# Patient Record
Sex: MALE | Race: WHITE | NOT HISPANIC OR LATINO | Employment: OTHER | URBAN - NONMETROPOLITAN AREA
[De-identification: names, ages, dates, MRNs, and addresses within clinical notes are randomized per-mention and may not be internally consistent; named-entity substitution may affect disease eponyms.]

---

## 2017-06-29 ENCOUNTER — OFFICE VISIT (OUTPATIENT)
Dept: GASTROENTEROLOGY | Facility: CLINIC | Age: 67
End: 2017-06-29

## 2017-06-29 VITALS
BODY MASS INDEX: 40.43 KG/M2 | DIASTOLIC BLOOD PRESSURE: 82 MMHG | SYSTOLIC BLOOD PRESSURE: 128 MMHG | HEIGHT: 74 IN | OXYGEN SATURATION: 96 % | HEART RATE: 63 BPM | WEIGHT: 315 LBS

## 2017-06-29 DIAGNOSIS — K22.70 BARRETT'S ESOPHAGUS WITHOUT DYSPLASIA: ICD-10-CM

## 2017-06-29 DIAGNOSIS — K63.5 COLON POLYPS: Primary | ICD-10-CM

## 2017-06-29 PROCEDURE — 99213 OFFICE O/P EST LOW 20 MIN: CPT | Performed by: NURSE PRACTITIONER

## 2017-06-29 RX ORDER — SITAGLIPTIN 100 MG/1
TABLET, FILM COATED ORAL
Refills: 3 | COMMUNITY
Start: 2017-06-09 | End: 2020-07-29 | Stop reason: ALTCHOICE

## 2017-06-29 RX ORDER — TIMOLOL MALEATE 5 MG/ML
SOLUTION OPHTHALMIC
Refills: 5 | Status: ON HOLD | COMMUNITY
Start: 2017-05-27 | End: 2022-08-03

## 2017-06-29 RX ORDER — ALBUTEROL SULFATE 90 UG/1
AEROSOL, METERED RESPIRATORY (INHALATION)
COMMUNITY

## 2017-06-29 RX ORDER — LEVOCETIRIZINE DIHYDROCHLORIDE 5 MG/1
TABLET, FILM COATED ORAL
Status: ON HOLD | COMMUNITY
Start: 2017-05-11 | End: 2017-07-26 | Stop reason: SDUPTHER

## 2017-06-29 RX ORDER — FLUOXETINE HYDROCHLORIDE 20 MG/1
CAPSULE ORAL
Refills: 2 | COMMUNITY
Start: 2017-05-25

## 2017-06-29 RX ORDER — LANSOPRAZOLE 30 MG/1
CAPSULE, DELAYED RELEASE ORAL
COMMUNITY
Start: 2017-06-01

## 2017-06-29 RX ORDER — FERROUS SULFATE 325(65) MG
TABLET ORAL
COMMUNITY
Start: 2016-10-04

## 2017-06-29 RX ORDER — LEVOCETIRIZINE DIHYDROCHLORIDE 5 MG/1
TABLET, FILM COATED ORAL
COMMUNITY
Start: 2016-11-14 | End: 2023-02-13 | Stop reason: ALTCHOICE

## 2017-06-29 RX ORDER — HYDROCHLOROTHIAZIDE 12.5 MG/1
TABLET ORAL
COMMUNITY
Start: 2017-06-28

## 2017-06-29 RX ORDER — EPINEPHRINE 0.3 MG/.3ML
INJECTION SUBCUTANEOUS
COMMUNITY

## 2017-06-29 RX ORDER — BUDESONIDE AND FORMOTEROL FUMARATE DIHYDRATE 160; 4.5 UG/1; UG/1
AEROSOL RESPIRATORY (INHALATION)
Refills: 0 | COMMUNITY
Start: 2017-04-26 | End: 2020-07-29 | Stop reason: ALTCHOICE

## 2017-06-29 RX ORDER — ERGOCALCIFEROL 1.25 MG/1
CAPSULE ORAL
COMMUNITY
Start: 2017-06-27

## 2017-06-29 RX ORDER — METFORMIN HYDROCHLORIDE 750 MG/1
TABLET, EXTENDED RELEASE ORAL
COMMUNITY
Start: 2017-06-27 | End: 2019-06-04

## 2017-06-29 RX ORDER — FLUTICASONE PROPIONATE 50 MCG
SPRAY, SUSPENSION (ML) NASAL
COMMUNITY
End: 2021-08-09 | Stop reason: ALTCHOICE

## 2017-06-29 RX ORDER — RANITIDINE 150 MG/1
TABLET ORAL
Refills: 2 | COMMUNITY
Start: 2017-05-11 | End: 2021-02-01 | Stop reason: ALTCHOICE

## 2017-06-29 RX ORDER — KETOROLAC TROMETHAMINE 10 MG/1
TABLET, FILM COATED ORAL
COMMUNITY
End: 2020-07-29 | Stop reason: ALTCHOICE

## 2017-06-29 RX ORDER — PROPRANOLOL HYDROCHLORIDE 120 MG/1
CAPSULE, EXTENDED RELEASE ORAL
COMMUNITY
Start: 2017-06-27

## 2017-06-29 RX ORDER — AZELASTINE HCL 205.5 UG/1
SPRAY NASAL
COMMUNITY
End: 2021-08-09 | Stop reason: ALTCHOICE

## 2017-06-29 NOTE — PROGRESS NOTES
Chief Complaint   Patient presents with   • Colonoscopy     Patient states his diarrhea has gotten worse since he had gastric bypass.   • Endoscopy     Dx with Parrish's.       Subjective     HPI  Diarrhea daily 3-4 times in the morning, always loose, never formed, worse over past 3-4 years.  Hx of gastric bypass surgery.   No bloody or melena stools.   no abdominal pain.  Diarrhea  Unrelated to caffeine or food intake.  hx of sessile hyperplastic polyp at 95 cm during 2014 procedure.    Long hx of GERD related symptoms.  Diagnosed with Parrish's Esophagus many years ago, determined by biopsy.  GERD controlled with use of Prevacid in the morning and Zantac at night.  No dysphagia, no abdominal pain, no heartburn, no indigestion.  Last EGD in 2014 reviewed with pt.  Endoscopically he had stable appearance of Parrish's.      Past Medical History:   Diagnosis Date   • Bundle branch block, right    • COPD (chronic obstructive pulmonary disease)    • Diabetes mellitus    • Irregular heart beat        Past Surgical History:   Procedure Laterality Date   • CHOLECYSTECTOMY     • COLONOSCOPY  05/07/2014    One 95cm polyp removed.  repeat 3 years  Dr Brar   • ENDOSCOPY  05/07/2014    Parrish's  repeat 3 years Dr Brar   • GASTRIC BYPASS     • KNEE SURGERY Right    • NERVE SURGERY      left arm   • TONSILLECTOMY         Outpatient Prescriptions Marked as Taking for the 6/29/17 encounter (Office Visit) with BERNARDINO Huang   Medication Sig Dispense Refill   • aclidinium bromide (TUDORZA PRESSAIR) 400 MCG/ACT aerosol powder  powder for inhalation Inhale.     • albuterol (PROVENTIL HFA;VENTOLIN HFA) 108 (90 BASE) MCG/ACT inhaler Inhale.     • aspirin 81 MG tablet Take  by mouth.     • azelastine (ASTEPRO) 0.15 % solution nasal spray into each nostril.     • EPINEPHrine (EPIPEN) 0.3 MG/0.3ML solution auto-injector injection Inject  into the shoulder, thigh, or buttocks.     • ferrous sulfate 325 (65 FE) MG tablet Take   by mouth.     • FLUoxetine (PROzac) 20 MG capsule TAKE 1 CAPSULE BY MOUTH DAILY IN THE MORNING  2   • fluticasone (FLONASE) 50 MCG/ACT nasal spray into each nostril.     • hydrochlorothiazide (HYDRODIURIL) 12.5 MG tablet      • JANUVIA 100 MG tablet TAKE 1 TABLET BY MOUTH EVERY DAY  3   • ketorolac (TORADOL) 10 MG tablet Take  by mouth.     • lansoprazole (PREVACID) 30 MG capsule      • levocetirizine (XYZAL) 5 MG tablet      • levocetirizine (XYZAL) 5 MG tablet Take  by mouth.     • metFORMIN ER (GLUCOPHAGE-XR) 750 MG 24 hr tablet      • propranolol LA (INDERAL LA) 120 MG 24 hr capsule      • raNITIdine (ZANTAC) 150 MG tablet TAKE 1 TABLET BY MOUTH TWO TIMES A DAY  2   • SYMBICORT 160-4.5 MCG/ACT inhaler INHALE 2 PUFFS INTO LUNG TWO TIMES A DAY  0   • timolol (TIMOPTIC-XR) 0.5 % ophthalmic gel-forming 1 SQUIRT IN BOTH EYES DAILY  5   • vitamin D (ERGOCALCIFEROL) 39380 UNITS capsule capsule          Allergies   Allergen Reactions   • Iodides    • Penicillins    • Shellfish-Derived Products      Sea foods   • Sulfa Antibiotics        Social History     Social History   • Marital status:      Spouse name: N/A   • Number of children: N/A   • Years of education: N/A     Occupational History   • Not on file.     Social History Main Topics   • Smoking status: Never Smoker   • Smokeless tobacco: Never Used   • Alcohol use 3.6 oz/week     6 Shots of liquor per week   • Drug use: No   • Sexual activity: Defer     Other Topics Concern   • Not on file     Social History Narrative   • No narrative on file       Family History   Problem Relation Age of Onset   • Colon cancer Neg Hx    • Colon polyps Neg Hx    • Esophageal cancer Neg Hx    • Liver cancer Neg Hx    • Liver disease Neg Hx    • Rectal cancer Neg Hx    • Stomach cancer Neg Hx        Review of Systems   Constitutional: Negative for fatigue, fever and unexpected weight change.   HENT: Negative for hearing loss, sore throat and voice change.    Eyes: Negative  "for visual disturbance.   Respiratory: Negative for cough, shortness of breath and wheezing.    Cardiovascular: Negative for chest pain and palpitations.   Gastrointestinal: Negative for abdominal pain, blood in stool and vomiting.   Endocrine: Negative for polydipsia and polyuria.   Genitourinary: Negative for difficulty urinating, dysuria, hematuria and urgency.   Musculoskeletal: Negative for joint swelling and myalgias.   Skin: Negative for color change, rash and wound.   Neurological: Negative for dizziness, tremors, seizures and syncope.   Hematological: Does not bruise/bleed easily.   Psychiatric/Behavioral: Negative for agitation and confusion. The patient is not nervous/anxious.        Objective     Vitals:    06/29/17 1053   BP: 128/82   Pulse: 63   SpO2: 96%   Weight: (!) 319 lb (145 kg)   Height: 74\" (188 cm)     Body mass index is 40.96 kg/(m^2).    Physical Exam   Constitutional: He is oriented to person, place, and time. He appears well-developed and well-nourished.   HENT:   Head: Normocephalic and atraumatic.   Eyes:   Pink, Nonicteric   Neck:   Global Assessment- supple. No JVD or lymphadenopathy   Cardiovascular: Normal rate, regular rhythm and normal heart sounds.  Exam reveals no gallop and no friction rub.    No murmur heard.  Pulmonary/Chest: Effort normal and breath sounds normal. No respiratory distress. He has no wheezes. He has no rales.   Inspection: Movements-Symmetrical   Abdominal: Soft. Bowel sounds are normal. He exhibits no distension and no mass. There is no tenderness. There is no rebound and no guarding.   Neurological: He is alert and oriented to person, place, and time.   General Exam-Deemed a reliable historian, able to converse without difficulty and Able to move all extremities without difficulty       Imaging Results (most recent)     None          Assessment/Plan     Wayne was seen today for colonoscopy and endoscopy.    Diagnoses and all orders for this visit:    Colon " polyps  -     Case Request; Standing  -     Implement Anesthesia Orders Day of Procedure; Standing  -     Obtain Informed Consent; Standing  -     Case Request    Parrish's esophagus without dysplasia  -     Case Request; Standing  -     Implement Anesthesia Orders Day of Procedure; Standing  -     Obtain Informed Consent; Standing  -     Case Request      COLONOSCOPY WITH ANESTHESIA (N/A)  Endoscopy with anesthesia    Patient is to continue all blood pressure and cardiac medications prior to procedure.     Advised pt to stop ASA day prior to procedure and to stop use of NSAIDs, Fish Oil, and MV 5 days prior to procedure.  Tylenol based products are ok to take.  Pt verbalized understanding.    Pt to hold diabetes medication/insulin day of procedure to prevent any risk of complications of hypoglycemia intraprocedure.  If taking insulin 1/2 the PM dose as well    All risks, benefits, alternatives, and indications of colonoscopy procedure have been discussed with the patient. Risks to include perforation of the colon requiring possible surgery or colostomy, risk of bleeding from biopsies or removal of colon tissue, possibility of missing a colon polyp or cancer, or adverse drug reaction.  Benefits to include the diagnosis and management of disease of the colon and rectum. Alternatives to include barium enema, radiographic evaluation, lab testing or no intervention. Pt verbalizes understanding and agrees to proceed with procedure.    The risk of the endoscopy were discussed in detail.  We discussed the risk of perforation (one out of 1779-9947, riskier with dilation), bleeding (one out of 500), and the rare risks of infection, adverse reaction to anesthesia, respiratory failure, cardiac failure including MI and adverse reaction to medications, etc.  We discussed consequences that could occur if a risk were to develop such as the need for hospitalization, blood transfusion, surgical intervention, medications, pain and  disability and death.  Alternatives include not doing anything, or pursuing an UGI series which only offers a diagnosis with potential less accuracy compared to egd.  The patient verbalizes understanding and agrees to proceed.        There are no Patient Instructions on file for this visit.

## 2017-07-20 ENCOUNTER — OFFICE VISIT (OUTPATIENT)
Dept: CARDIOLOGY | Age: 67
End: 2017-07-20
Payer: MEDICARE

## 2017-07-20 VITALS
SYSTOLIC BLOOD PRESSURE: 132 MMHG | HEART RATE: 68 BPM | HEIGHT: 74 IN | DIASTOLIC BLOOD PRESSURE: 80 MMHG | WEIGHT: 315 LBS | BODY MASS INDEX: 40.43 KG/M2

## 2017-07-20 DIAGNOSIS — I25.10 CORONARY ATHEROSCLEROSIS DUE TO CALCIFIED CORONARY LESION OF NATIVE ARTERY: Primary | ICD-10-CM

## 2017-07-20 DIAGNOSIS — I25.84 CORONARY ATHEROSCLEROSIS DUE TO CALCIFIED CORONARY LESION OF NATIVE ARTERY: Primary | ICD-10-CM

## 2017-07-20 DIAGNOSIS — I10 ESSENTIAL HYPERTENSION: ICD-10-CM

## 2017-07-20 PROCEDURE — G8427 DOCREV CUR MEDS BY ELIG CLIN: HCPCS | Performed by: CLINICAL NURSE SPECIALIST

## 2017-07-20 PROCEDURE — 1036F TOBACCO NON-USER: CPT | Performed by: CLINICAL NURSE SPECIALIST

## 2017-07-20 PROCEDURE — G8598 ASA/ANTIPLAT THER USED: HCPCS | Performed by: CLINICAL NURSE SPECIALIST

## 2017-07-20 PROCEDURE — 3017F COLORECTAL CA SCREEN DOC REV: CPT | Performed by: CLINICAL NURSE SPECIALIST

## 2017-07-20 PROCEDURE — 4040F PNEUMOC VAC/ADMIN/RCVD: CPT | Performed by: CLINICAL NURSE SPECIALIST

## 2017-07-20 PROCEDURE — G8417 CALC BMI ABV UP PARAM F/U: HCPCS | Performed by: CLINICAL NURSE SPECIALIST

## 2017-07-20 PROCEDURE — 99213 OFFICE O/P EST LOW 20 MIN: CPT | Performed by: CLINICAL NURSE SPECIALIST

## 2017-07-20 PROCEDURE — 1123F ACP DISCUSS/DSCN MKR DOCD: CPT | Performed by: CLINICAL NURSE SPECIALIST

## 2017-07-26 ENCOUNTER — HOSPITAL ENCOUNTER (OUTPATIENT)
Facility: HOSPITAL | Age: 67
Setting detail: HOSPITAL OUTPATIENT SURGERY
Discharge: HOME | End: 2017-07-26
Attending: INTERNAL MEDICINE | Admitting: INTERNAL MEDICINE

## 2017-07-26 ENCOUNTER — ANESTHESIA (OUTPATIENT)
Dept: GASTROENTEROLOGY | Facility: HOSPITAL | Age: 67
End: 2017-07-26

## 2017-07-26 ENCOUNTER — ANESTHESIA EVENT (OUTPATIENT)
Dept: GASTROENTEROLOGY | Facility: HOSPITAL | Age: 67
End: 2017-07-26

## 2017-07-26 ENCOUNTER — TELEPHONE (OUTPATIENT)
Dept: GASTROENTEROLOGY | Facility: CLINIC | Age: 67
End: 2017-07-26

## 2017-07-26 VITALS
RESPIRATION RATE: 18 BRPM | TEMPERATURE: 97.2 F | DIASTOLIC BLOOD PRESSURE: 83 MMHG | BODY MASS INDEX: 40.43 KG/M2 | OXYGEN SATURATION: 97 % | HEART RATE: 53 BPM | SYSTOLIC BLOOD PRESSURE: 124 MMHG | HEIGHT: 74 IN | WEIGHT: 315 LBS

## 2017-07-26 DIAGNOSIS — K22.70 BARRETT'S ESOPHAGUS WITHOUT DYSPLASIA: ICD-10-CM

## 2017-07-26 LAB — GLUCOSE BLDC GLUCOMTR-MCNC: 207 MG/DL (ref 70–130)

## 2017-07-26 PROCEDURE — 88305 TISSUE EXAM BY PATHOLOGIST: CPT | Performed by: INTERNAL MEDICINE

## 2017-07-26 PROCEDURE — 82962 GLUCOSE BLOOD TEST: CPT

## 2017-07-26 PROCEDURE — 25010000002 PROPOFOL 10 MG/ML EMULSION: Performed by: NURSE ANESTHETIST, CERTIFIED REGISTERED

## 2017-07-26 PROCEDURE — 43239 EGD BIOPSY SINGLE/MULTIPLE: CPT | Performed by: INTERNAL MEDICINE

## 2017-07-26 RX ORDER — SODIUM CHLORIDE 9 MG/ML
500 INJECTION, SOLUTION INTRAVENOUS CONTINUOUS PRN
Status: DISCONTINUED | OUTPATIENT
Start: 2017-07-26 | End: 2017-07-26 | Stop reason: HOSPADM

## 2017-07-26 RX ORDER — SODIUM CHLORIDE 0.9 % (FLUSH) 0.9 %
3 SYRINGE (ML) INJECTION AS NEEDED
Status: DISCONTINUED | OUTPATIENT
Start: 2017-07-26 | End: 2017-07-26 | Stop reason: HOSPADM

## 2017-07-26 RX ORDER — PROPOFOL 10 MG/ML
VIAL (ML) INTRAVENOUS AS NEEDED
Status: DISCONTINUED | OUTPATIENT
Start: 2017-07-26 | End: 2017-07-26 | Stop reason: SURG

## 2017-07-26 RX ORDER — LIDOCAINE HYDROCHLORIDE 10 MG/ML
0.5 INJECTION, SOLUTION INFILTRATION; PERINEURAL ONCE AS NEEDED
Status: COMPLETED | OUTPATIENT
Start: 2017-07-26 | End: 2017-07-26

## 2017-07-26 RX ADMIN — LIDOCAINE HYDROCHLORIDE 0.5 ML: 10 INJECTION, SOLUTION INFILTRATION; PERINEURAL at 09:28

## 2017-07-26 RX ADMIN — PROPOFOL 80 MG: 10 INJECTION, EMULSION INTRAVENOUS at 10:23

## 2017-07-26 RX ADMIN — SODIUM CHLORIDE: 9 INJECTION, SOLUTION INTRAVENOUS at 10:23

## 2017-07-26 RX ADMIN — PROPOFOL 30 MG: 10 INJECTION, EMULSION INTRAVENOUS at 10:24

## 2017-07-26 RX ADMIN — PROPOFOL 30 MG: 10 INJECTION, EMULSION INTRAVENOUS at 10:25

## 2017-07-26 RX ADMIN — SODIUM CHLORIDE 500 ML: 9 INJECTION, SOLUTION INTRAVENOUS at 09:27

## 2017-07-26 NOTE — H&P (VIEW-ONLY)
Chief Complaint   Patient presents with   • Colonoscopy     Patient states his diarrhea has gotten worse since he had gastric bypass.   • Endoscopy     Dx with Parrish's.       Subjective     HPI  Diarrhea daily 3-4 times in the morning, always loose, never formed, worse over past 3-4 years.  Hx of gastric bypass surgery.   No bloody or melena stools.   no abdominal pain.  Diarrhea  Unrelated to caffeine or food intake.  hx of sessile hyperplastic polyp at 95 cm during 2014 procedure.    Long hx of GERD related symptoms.  Diagnosed with Parrish's Esophagus many years ago, determined by biopsy.  GERD controlled with use of Prevacid in the morning and Zantac at night.  No dysphagia, no abdominal pain, no heartburn, no indigestion.  Last EGD in 2014 reviewed with pt.  Endoscopically he had stable appearance of Parrish's.      Past Medical History:   Diagnosis Date   • Bundle branch block, right    • COPD (chronic obstructive pulmonary disease)    • Diabetes mellitus    • Irregular heart beat        Past Surgical History:   Procedure Laterality Date   • CHOLECYSTECTOMY     • COLONOSCOPY  05/07/2014    One 95cm polyp removed.  repeat 3 years  Dr Brar   • ENDOSCOPY  05/07/2014    Parrish's  repeat 3 years Dr Brar   • GASTRIC BYPASS     • KNEE SURGERY Right    • NERVE SURGERY      left arm   • TONSILLECTOMY         Outpatient Prescriptions Marked as Taking for the 6/29/17 encounter (Office Visit) with BERNARDINO Huang   Medication Sig Dispense Refill   • aclidinium bromide (TUDORZA PRESSAIR) 400 MCG/ACT aerosol powder  powder for inhalation Inhale.     • albuterol (PROVENTIL HFA;VENTOLIN HFA) 108 (90 BASE) MCG/ACT inhaler Inhale.     • aspirin 81 MG tablet Take  by mouth.     • azelastine (ASTEPRO) 0.15 % solution nasal spray into each nostril.     • EPINEPHrine (EPIPEN) 0.3 MG/0.3ML solution auto-injector injection Inject  into the shoulder, thigh, or buttocks.     • ferrous sulfate 325 (65 FE) MG tablet Take   by mouth.     • FLUoxetine (PROzac) 20 MG capsule TAKE 1 CAPSULE BY MOUTH DAILY IN THE MORNING  2   • fluticasone (FLONASE) 50 MCG/ACT nasal spray into each nostril.     • hydrochlorothiazide (HYDRODIURIL) 12.5 MG tablet      • JANUVIA 100 MG tablet TAKE 1 TABLET BY MOUTH EVERY DAY  3   • ketorolac (TORADOL) 10 MG tablet Take  by mouth.     • lansoprazole (PREVACID) 30 MG capsule      • levocetirizine (XYZAL) 5 MG tablet      • levocetirizine (XYZAL) 5 MG tablet Take  by mouth.     • metFORMIN ER (GLUCOPHAGE-XR) 750 MG 24 hr tablet      • propranolol LA (INDERAL LA) 120 MG 24 hr capsule      • raNITIdine (ZANTAC) 150 MG tablet TAKE 1 TABLET BY MOUTH TWO TIMES A DAY  2   • SYMBICORT 160-4.5 MCG/ACT inhaler INHALE 2 PUFFS INTO LUNG TWO TIMES A DAY  0   • timolol (TIMOPTIC-XR) 0.5 % ophthalmic gel-forming 1 SQUIRT IN BOTH EYES DAILY  5   • vitamin D (ERGOCALCIFEROL) 59682 UNITS capsule capsule          Allergies   Allergen Reactions   • Iodides    • Penicillins    • Shellfish-Derived Products      Sea foods   • Sulfa Antibiotics        Social History     Social History   • Marital status:      Spouse name: N/A   • Number of children: N/A   • Years of education: N/A     Occupational History   • Not on file.     Social History Main Topics   • Smoking status: Never Smoker   • Smokeless tobacco: Never Used   • Alcohol use 3.6 oz/week     6 Shots of liquor per week   • Drug use: No   • Sexual activity: Defer     Other Topics Concern   • Not on file     Social History Narrative   • No narrative on file       Family History   Problem Relation Age of Onset   • Colon cancer Neg Hx    • Colon polyps Neg Hx    • Esophageal cancer Neg Hx    • Liver cancer Neg Hx    • Liver disease Neg Hx    • Rectal cancer Neg Hx    • Stomach cancer Neg Hx        Review of Systems   Constitutional: Negative for fatigue, fever and unexpected weight change.   HENT: Negative for hearing loss, sore throat and voice change.    Eyes: Negative  "for visual disturbance.   Respiratory: Negative for cough, shortness of breath and wheezing.    Cardiovascular: Negative for chest pain and palpitations.   Gastrointestinal: Negative for abdominal pain, blood in stool and vomiting.   Endocrine: Negative for polydipsia and polyuria.   Genitourinary: Negative for difficulty urinating, dysuria, hematuria and urgency.   Musculoskeletal: Negative for joint swelling and myalgias.   Skin: Negative for color change, rash and wound.   Neurological: Negative for dizziness, tremors, seizures and syncope.   Hematological: Does not bruise/bleed easily.   Psychiatric/Behavioral: Negative for agitation and confusion. The patient is not nervous/anxious.        Objective     Vitals:    06/29/17 1053   BP: 128/82   Pulse: 63   SpO2: 96%   Weight: (!) 319 lb (145 kg)   Height: 74\" (188 cm)     Body mass index is 40.96 kg/(m^2).    Physical Exam   Constitutional: He is oriented to person, place, and time. He appears well-developed and well-nourished.   HENT:   Head: Normocephalic and atraumatic.   Eyes:   Pink, Nonicteric   Neck:   Global Assessment- supple. No JVD or lymphadenopathy   Cardiovascular: Normal rate, regular rhythm and normal heart sounds.  Exam reveals no gallop and no friction rub.    No murmur heard.  Pulmonary/Chest: Effort normal and breath sounds normal. No respiratory distress. He has no wheezes. He has no rales.   Inspection: Movements-Symmetrical   Abdominal: Soft. Bowel sounds are normal. He exhibits no distension and no mass. There is no tenderness. There is no rebound and no guarding.   Neurological: He is alert and oriented to person, place, and time.   General Exam-Deemed a reliable historian, able to converse without difficulty and Able to move all extremities without difficulty       Imaging Results (most recent)     None          Assessment/Plan     Wayne was seen today for colonoscopy and endoscopy.    Diagnoses and all orders for this visit:    Colon " polyps  -     Case Request; Standing  -     Implement Anesthesia Orders Day of Procedure; Standing  -     Obtain Informed Consent; Standing  -     Case Request    Parrish's esophagus without dysplasia  -     Case Request; Standing  -     Implement Anesthesia Orders Day of Procedure; Standing  -     Obtain Informed Consent; Standing  -     Case Request      COLONOSCOPY WITH ANESTHESIA (N/A)  Endoscopy with anesthesia    Patient is to continue all blood pressure and cardiac medications prior to procedure.     Advised pt to stop ASA day prior to procedure and to stop use of NSAIDs, Fish Oil, and MV 5 days prior to procedure.  Tylenol based products are ok to take.  Pt verbalized understanding.    Pt to hold diabetes medication/insulin day of procedure to prevent any risk of complications of hypoglycemia intraprocedure.  If taking insulin 1/2 the PM dose as well    All risks, benefits, alternatives, and indications of colonoscopy procedure have been discussed with the patient. Risks to include perforation of the colon requiring possible surgery or colostomy, risk of bleeding from biopsies or removal of colon tissue, possibility of missing a colon polyp or cancer, or adverse drug reaction.  Benefits to include the diagnosis and management of disease of the colon and rectum. Alternatives to include barium enema, radiographic evaluation, lab testing or no intervention. Pt verbalizes understanding and agrees to proceed with procedure.    The risk of the endoscopy were discussed in detail.  We discussed the risk of perforation (one out of 0861-6041, riskier with dilation), bleeding (one out of 500), and the rare risks of infection, adverse reaction to anesthesia, respiratory failure, cardiac failure including MI and adverse reaction to medications, etc.  We discussed consequences that could occur if a risk were to develop such as the need for hospitalization, blood transfusion, surgical intervention, medications, pain and  disability and death.  Alternatives include not doing anything, or pursuing an UGI series which only offers a diagnosis with potential less accuracy compared to egd.  The patient verbalizes understanding and agrees to proceed.        There are no Patient Instructions on file for this visit.

## 2017-07-26 NOTE — PLAN OF CARE
Problem: Patient Care Overview (Adult)  Goal: Plan of Care Review  Outcome: Outcome(s) achieved Date Met:  07/26/17

## 2017-07-26 NOTE — PLAN OF CARE
Problem: Patient Care Overview (Adult)  Goal: Plan of Care Review  Outcome: Ongoing (interventions implemented as appropriate)    07/26/17 1022   Patient Care Overview   Progress improving   Outcome Evaluation   Outcome Summary/Follow up Plan no noted problems

## 2017-07-26 NOTE — PLAN OF CARE
Problem: GI Endoscopy (Adult)  Goal: Signs and Symptoms of Listed Potential Problems Will be Absent or Manageable (GI Endoscopy)  Outcome: Outcome(s) achieved Date Met:  07/26/17

## 2017-07-26 NOTE — ANESTHESIA PREPROCEDURE EVALUATION
Anesthesia Evaluation     Patient summary reviewed and Nursing notes reviewed   no history of anesthetic complications:  NPO Solid Status: > 8 hours  NPO Liquid Status: > 8 hours     Airway   Mallampati: II  TM distance: >3 FB  Neck ROM: full  no difficulty expected  Dental      Pulmonary     breath sounds clear to auscultation  (+) COPD (uses inhalers regularly), sleep apnea on CPAP,   (-) not a smoker  Cardiovascular   Exercise tolerance: good (4-7 METS)    Patient on routine beta blocker and Beta blocker given within 24 hours of surgery  Rhythm: regular  Rate: normal    (+) hypertension, CAD (noted calcification on chest CT, negative stress test 6 months ago), dysrhythmias (RBBB),       Neuro/Psych- negative ROS  (-) seizures, TIA, CVA  GI/Hepatic/Renal/Endo    (+) morbid obesity, diabetes mellitus type 2,   (-) liver disease, no renal disease    Musculoskeletal     Abdominal    Substance History - negative use     OB/GYN negative ob/gyn ROS         Other   (+) arthritis                                     Anesthesia Plan    ASA 3     general     intravenous induction   Anesthetic plan and risks discussed with patient.

## 2017-07-26 NOTE — ANESTHESIA POSTPROCEDURE EVALUATION
Patient: Wayne Linares    Procedure Summary     Date Anesthesia Start Anesthesia Stop Room / Location    07/26/17 1020 1036  [facility] /  [facility]       Procedure Diagnosis Surgeon Provider    ESOPHAGOGASTRODUODENOSCOPY WITH ANESTHESIA (N/A Esophagus) Parrish's esophagus without dysplasia  (Parrish's esophagus without dysplasia [K22.70]) DO David Lebron CRNA          Anesthesia Type: general  Last vitals  BP   113/75 (07/26/17 1038)    Temp        Pulse   57 (07/26/17 1038)   Resp   18 (07/26/17 1038)    SpO2   96 % (07/26/17 1038)      Post Anesthesia Care and Evaluation    Patient location during evaluation: PHASE II  Patient participation: waiting for patient participation  Level of consciousness: awake  Pain score: 0  Pain management: adequate  Airway patency: patent  Anesthetic complications: No anesthetic complications  PONV Status: none  Cardiovascular status: acceptable  Respiratory status: acceptable  Hydration status: acceptable  No anesthesia care post op

## 2017-07-27 LAB
CYTO UR: NORMAL
LAB AP CASE REPORT: NORMAL
LAB AP CLINICAL INFORMATION: NORMAL
Lab: NORMAL
PATH REPORT.FINAL DX SPEC: NORMAL
PATH REPORT.GROSS SPEC: NORMAL

## 2017-08-01 ENCOUNTER — ANESTHESIA (OUTPATIENT)
Dept: GASTROENTEROLOGY | Facility: HOSPITAL | Age: 67
End: 2017-08-01

## 2017-08-01 ENCOUNTER — TELEPHONE (OUTPATIENT)
Dept: GASTROENTEROLOGY | Facility: CLINIC | Age: 67
End: 2017-08-01

## 2017-08-01 ENCOUNTER — HOSPITAL ENCOUNTER (OUTPATIENT)
Facility: HOSPITAL | Age: 67
Setting detail: HOSPITAL OUTPATIENT SURGERY
Discharge: HOME | End: 2017-08-01
Attending: INTERNAL MEDICINE | Admitting: INTERNAL MEDICINE

## 2017-08-01 ENCOUNTER — ANESTHESIA EVENT (OUTPATIENT)
Dept: GASTROENTEROLOGY | Facility: HOSPITAL | Age: 67
End: 2017-08-01

## 2017-08-01 VITALS
SYSTOLIC BLOOD PRESSURE: 114 MMHG | HEIGHT: 74 IN | WEIGHT: 315 LBS | HEART RATE: 58 BPM | DIASTOLIC BLOOD PRESSURE: 70 MMHG | TEMPERATURE: 97.4 F | RESPIRATION RATE: 17 BRPM | OXYGEN SATURATION: 96 % | BODY MASS INDEX: 40.43 KG/M2

## 2017-08-01 DIAGNOSIS — K63.5 COLON POLYPS: ICD-10-CM

## 2017-08-01 LAB — GLUCOSE BLDC GLUCOMTR-MCNC: 170 MG/DL (ref 70–130)

## 2017-08-01 PROCEDURE — 82962 GLUCOSE BLOOD TEST: CPT

## 2017-08-01 PROCEDURE — 45385 COLONOSCOPY W/LESION REMOVAL: CPT | Performed by: INTERNAL MEDICINE

## 2017-08-01 PROCEDURE — 25010000002 PROPOFOL 10 MG/ML EMULSION: Performed by: NURSE ANESTHETIST, CERTIFIED REGISTERED

## 2017-08-01 PROCEDURE — 88305 TISSUE EXAM BY PATHOLOGIST: CPT | Performed by: INTERNAL MEDICINE

## 2017-08-01 RX ORDER — SODIUM CHLORIDE 0.9 % (FLUSH) 0.9 %
3 SYRINGE (ML) INJECTION AS NEEDED
Status: DISCONTINUED | OUTPATIENT
Start: 2017-08-01 | End: 2017-08-01 | Stop reason: HOSPADM

## 2017-08-01 RX ORDER — SODIUM CHLORIDE 9 MG/ML
500 INJECTION, SOLUTION INTRAVENOUS CONTINUOUS PRN
Status: DISCONTINUED | OUTPATIENT
Start: 2017-08-01 | End: 2017-08-01 | Stop reason: HOSPADM

## 2017-08-01 RX ORDER — LIDOCAINE HYDROCHLORIDE 10 MG/ML
0.5 INJECTION, SOLUTION INFILTRATION; PERINEURAL ONCE AS NEEDED
Status: DISCONTINUED | OUTPATIENT
Start: 2017-08-01 | End: 2017-08-01

## 2017-08-01 RX ORDER — PROPOFOL 10 MG/ML
VIAL (ML) INTRAVENOUS AS NEEDED
Status: DISCONTINUED | OUTPATIENT
Start: 2017-08-01 | End: 2017-08-01 | Stop reason: SURG

## 2017-08-01 RX ADMIN — PROPOFOL 200 MG: 10 INJECTION, EMULSION INTRAVENOUS at 09:05

## 2017-08-01 RX ADMIN — SODIUM CHLORIDE 500 ML: 0.9 INJECTION, SOLUTION INTRAVENOUS at 08:39

## 2017-08-01 NOTE — PLAN OF CARE
Problem: Patient Care Overview (Adult)  Goal: Plan of Care Review  Outcome: Outcome(s) achieved Date Met:  08/01/17 08/01/17 0951   Patient Care Overview   Progress improving   Outcome Evaluation   Outcome Summary/Follow up Plan D/C CRITERIA MET   Coping/Psychosocial Response Interventions   Plan Of Care Reviewed With patient;spouse

## 2017-08-01 NOTE — ANESTHESIA PREPROCEDURE EVALUATION
Anesthesia Evaluation     Patient summary reviewed and Nursing notes reviewed   no history of anesthetic complications:  NPO Solid Status: > 8 hours  NPO Liquid Status: > 8 hours     Airway   Mallampati: II  TM distance: >3 FB  Neck ROM: full  Dental      Pulmonary     breath sounds clear to auscultation  (+) COPD (uses inhalers regularly), sleep apnea on CPAP,   (-) not a smoker  Cardiovascular   Exercise tolerance: good (4-7 METS)    Patient on routine beta blocker and Beta blocker given within 24 hours of surgery  Rhythm: regular  Rate: normal    (+) hypertension, CAD (noted calcification on chest CT, negative stress test 6 months ago), dysrhythmias (RBBB),       Neuro/Psych- negative ROS  (-) seizures, TIA, CVA  GI/Hepatic/Renal/Endo    (+) morbid obesity, diabetes mellitus type 2,   (-) liver disease, no renal disease    Musculoskeletal     Abdominal    Substance History - negative use     OB/GYN negative ob/gyn ROS         Other   (+) arthritis                                     Anesthesia Plan    ASA 3     MAC     intravenous induction   Anesthetic plan and risks discussed with patient.    Plan discussed with CRNA.

## 2017-08-01 NOTE — H&P
[facility]  Pre Procedure History & Physical    Chief Complaint:   H/O Polyps    Subjective     HPI:   Polyps    Past Medical History:   Past Medical History:   Diagnosis Date   • Arthritis    • Bundle branch block, right    • COPD (chronic obstructive pulmonary disease)    • Coronary artery disease    • Diabetes mellitus    • Hypertension    • Irregular heart beat    • Sleep apnea        Past Surgical History:  [unfilled]    Family History:  Family History   Problem Relation Age of Onset   • Cancer Mother      breast   • Heart disease Father    • Cancer Brother      bladder   • Colon cancer Neg Hx    • Colon polyps Neg Hx    • Esophageal cancer Neg Hx    • Liver cancer Neg Hx    • Liver disease Neg Hx    • Rectal cancer Neg Hx    • Stomach cancer Neg Hx        Social History:   reports that he has never smoked. He has never used smokeless tobacco. He reports that he drinks about 3.6 oz of alcohol per week  He reports that he does not use illicit drugs.    Medications:   Prior to Admission medications    Medication Sig Start Date End Date Taking? Authorizing Provider   azelastine (ASTEPRO) 0.15 % solution nasal spray into each nostril.   Yes Historical Provider, MD   FLUoxetine (PROzac) 20 MG capsule TAKE 1 CAPSULE BY MOUTH DAILY IN THE MORNING 5/25/17  Yes Historical Provider, MD   fluticasone (FLONASE) 50 MCG/ACT nasal spray into each nostril.   Yes Historical Provider, MD   Iron Combinations (IRON COMPLEX PO) Take  by mouth.   Yes Historical Provider, MD   lansoprazole (PREVACID) 30 MG capsule  6/1/17  Yes Historical Provider, MD   propranolol LA (INDERAL LA) 120 MG 24 hr capsule  6/27/17  Yes Historical Provider, MD   SYMBICORT 160-4.5 MCG/ACT inhaler INHALE 2 PUFFS INTO LUNG TWO TIMES A DAY 4/26/17  Yes Historical Provider, MD   timolol (TIMOPTIC-XR) 0.5 % ophthalmic gel-forming 1 SQUIRT IN BOTH EYES DAILY 5/27/17  Yes Historical Provider, MD   aclidinium bromide (TUDORZA PRESSAIR) 400 MCG/ACT  "aerosol powder  powder for inhalation Inhale.    Historical Provider, MD   albuterol (PROVENTIL HFA;VENTOLIN HFA) 108 (90 BASE) MCG/ACT inhaler Inhale.    Historical Provider, MD   aspirin 81 MG tablet Take  by mouth.    Historical Provider, MD   EPINEPHrine (EPIPEN) 0.3 MG/0.3ML solution auto-injector injection Inject  into the shoulder, thigh, or buttocks.    Historical Provider, MD   ferrous sulfate 325 (65 FE) MG tablet Take  by mouth. 10/4/16   Historical Provider, MD   hydrochlorothiazide (HYDRODIURIL) 12.5 MG tablet  6/28/17   Historical Provider, MD   JANUVIA 100 MG tablet TAKE 1 TABLET BY MOUTH EVERY DAY 6/9/17   Historical Provider, MD   ketorolac (TORADOL) 10 MG tablet Take  by mouth.    Historical Provider, MD   levocetirizine (XYZAL) 5 MG tablet Take  by mouth. 11/14/16   Historical Provider, MD   metFORMIN ER (GLUCOPHAGE-XR) 750 MG 24 hr tablet  6/27/17   Historical Provider, MD   raNITIdine (ZANTAC) 150 MG tablet TAKE 1 TABLET BY MOUTH TWO TIMES A DAY 5/11/17   Historical Provider, MD   vitamin D (ERGOCALCIFEROL) 74691 UNITS capsule capsule  6/27/17   Historical Provider, MD       Allergies:  Iodides; Shellfish-derived products; Penicillins; and Sulfa antibiotics    Objective     Blood pressure 122/81, pulse 56, temperature 97.4 °F (36.3 °C), temperature source Temporal Artery , resp. rate 20, height 74\" (188 cm), weight (!) 317 lb (144 kg), SpO2 97 %.    Physical Exam   Constitutional: Pt is oriented to person, place, and in no distress.   HENT: Mouth/Throat: Oropharynx is clear.   Cardiovascular: Normal rate, regular rhythm.    Pulmonary/Chest: Effort normal. No respiratory distress. No  wheezes.   Abdominal: Soft. Non-distended.  Skin: Skin is warm and dry.   Psychiatric: Mood, memory, affect and judgment appear normal.     Assessment/Plan     Diagnosis:  Polyps    Anticipated Surgical Procedure:  C-scope    The risks, benefits, and alternatives of this procedure have been discussed with the " patient or the responsible party- the patient understands and agrees to proceed.

## 2017-08-01 NOTE — PLAN OF CARE
Problem: GI Endoscopy (Adult)  Goal: Signs and Symptoms of Listed Potential Problems Will be Absent or Manageable (GI Endoscopy)  Outcome: Outcome(s) achieved Date Met:  08/01/17

## 2017-08-01 NOTE — ANESTHESIA POSTPROCEDURE EVALUATION
Patient: Wayne Linares    Procedure Summary     Date Anesthesia Start Anesthesia Stop Room / Location    08/01/17 0901 0921 [facility] ENDOSCOPY 6 / [facility]       Procedure Diagnosis Surgeon Provider    COLONOSCOPY WITH ANESTHESIA (N/A ) Colon polyps  (Colon polyps [K63.5]) DO Claudia Lebron, MATTHIAS          Anesthesia Type: MAC  Last vitals  BP   119/69 (08/01/17 0935)    Temp        Pulse   55 (08/01/17 0935)   Resp   16 (08/01/17 0935)    SpO2   96 % (08/01/17 0935)      Post Anesthesia Care and Evaluation    Patient location during evaluation: PHASE II  Patient participation: complete - patient participated  Level of consciousness: awake and alert  Pain management: adequate  Airway patency: patent  Anesthetic complications: No anesthetic complications  PONV Status: none  Cardiovascular status: acceptable  Respiratory status: acceptable  Hydration status: acceptable

## 2017-08-01 NOTE — PLAN OF CARE
Problem: Patient Care Overview (Adult)  Goal: Plan of Care Review  Outcome: Ongoing (interventions implemented as appropriate)    08/01/17 0914   Patient Care Overview   Progress improving   Outcome Evaluation   Outcome Summary/Follow up Plan pt tolerating procedure well          Problem: GI Endoscopy (Adult)  Goal: Signs and Symptoms of Listed Potential Problems Will be Absent or Manageable (GI Endoscopy)  Outcome: Ongoing (interventions implemented as appropriate)    08/01/17 0914   GI Endoscopy   Problems Assessed (GI Endoscopy) all   Problems Present (GI Endoscopy) none

## 2017-09-21 ENCOUNTER — OFFICE VISIT (OUTPATIENT)
Dept: NEUROLOGY | Age: 67
End: 2017-09-21
Payer: MEDICARE

## 2017-09-21 VITALS
WEIGHT: 315 LBS | HEART RATE: 55 BPM | DIASTOLIC BLOOD PRESSURE: 78 MMHG | OXYGEN SATURATION: 93 % | HEIGHT: 74 IN | BODY MASS INDEX: 40.43 KG/M2 | SYSTOLIC BLOOD PRESSURE: 117 MMHG

## 2017-09-21 DIAGNOSIS — G25.0 ESSENTIAL TREMOR: ICD-10-CM

## 2017-09-21 DIAGNOSIS — R40.0 SOMNOLENCE: ICD-10-CM

## 2017-09-21 DIAGNOSIS — G56.21 CUBITAL TUNNEL SYNDROME, RIGHT: ICD-10-CM

## 2017-09-21 DIAGNOSIS — G47.33 SLEEP APNEA, OBSTRUCTIVE: Primary | ICD-10-CM

## 2017-09-21 PROCEDURE — 4040F PNEUMOC VAC/ADMIN/RCVD: CPT | Performed by: PSYCHIATRY & NEUROLOGY

## 2017-09-21 PROCEDURE — G8417 CALC BMI ABV UP PARAM F/U: HCPCS | Performed by: PSYCHIATRY & NEUROLOGY

## 2017-09-21 PROCEDURE — 1036F TOBACCO NON-USER: CPT | Performed by: PSYCHIATRY & NEUROLOGY

## 2017-09-21 PROCEDURE — G8598 ASA/ANTIPLAT THER USED: HCPCS | Performed by: PSYCHIATRY & NEUROLOGY

## 2017-09-21 PROCEDURE — G8427 DOCREV CUR MEDS BY ELIG CLIN: HCPCS | Performed by: PSYCHIATRY & NEUROLOGY

## 2017-09-21 PROCEDURE — 3017F COLORECTAL CA SCREEN DOC REV: CPT | Performed by: PSYCHIATRY & NEUROLOGY

## 2017-09-21 PROCEDURE — 1123F ACP DISCUSS/DSCN MKR DOCD: CPT | Performed by: PSYCHIATRY & NEUROLOGY

## 2017-09-21 PROCEDURE — 99213 OFFICE O/P EST LOW 20 MIN: CPT | Performed by: PSYCHIATRY & NEUROLOGY

## 2018-01-11 ENCOUNTER — OFFICE VISIT (OUTPATIENT)
Dept: CARDIOLOGY | Age: 68
End: 2018-01-11
Payer: MEDICARE

## 2018-01-11 VITALS
WEIGHT: 299 LBS | HEART RATE: 60 BPM | DIASTOLIC BLOOD PRESSURE: 72 MMHG | HEIGHT: 74 IN | SYSTOLIC BLOOD PRESSURE: 128 MMHG | BODY MASS INDEX: 38.37 KG/M2

## 2018-01-11 DIAGNOSIS — I10 ESSENTIAL HYPERTENSION: Primary | ICD-10-CM

## 2018-01-11 DIAGNOSIS — I25.84 CORONARY ATHEROSCLEROSIS DUE TO CALCIFIED CORONARY LESION OF NATIVE ARTERY: ICD-10-CM

## 2018-01-11 DIAGNOSIS — I25.10 CORONARY ATHEROSCLEROSIS DUE TO CALCIFIED CORONARY LESION OF NATIVE ARTERY: ICD-10-CM

## 2018-01-11 PROCEDURE — 4040F PNEUMOC VAC/ADMIN/RCVD: CPT | Performed by: CLINICAL NURSE SPECIALIST

## 2018-01-11 PROCEDURE — 1036F TOBACCO NON-USER: CPT | Performed by: CLINICAL NURSE SPECIALIST

## 2018-01-11 PROCEDURE — 3017F COLORECTAL CA SCREEN DOC REV: CPT | Performed by: CLINICAL NURSE SPECIALIST

## 2018-01-11 PROCEDURE — G8484 FLU IMMUNIZE NO ADMIN: HCPCS | Performed by: CLINICAL NURSE SPECIALIST

## 2018-01-11 PROCEDURE — 1123F ACP DISCUSS/DSCN MKR DOCD: CPT | Performed by: CLINICAL NURSE SPECIALIST

## 2018-01-11 PROCEDURE — G8427 DOCREV CUR MEDS BY ELIG CLIN: HCPCS | Performed by: CLINICAL NURSE SPECIALIST

## 2018-01-11 PROCEDURE — G8417 CALC BMI ABV UP PARAM F/U: HCPCS | Performed by: CLINICAL NURSE SPECIALIST

## 2018-01-11 PROCEDURE — G8598 ASA/ANTIPLAT THER USED: HCPCS | Performed by: CLINICAL NURSE SPECIALIST

## 2018-01-11 PROCEDURE — 93000 ELECTROCARDIOGRAM COMPLETE: CPT | Performed by: CLINICAL NURSE SPECIALIST

## 2018-01-11 PROCEDURE — 99213 OFFICE O/P EST LOW 20 MIN: CPT | Performed by: CLINICAL NURSE SPECIALIST

## 2018-01-11 NOTE — PROGRESS NOTES
[facility] [place]  [address]  Phone: [phone]  Fax: [phone]    OFFICE VISIT:  2018    Hubert Dandy - : 1950    Reason For Visit:  Chandni Fountain is a 78 y.o. male who is here for 6 Month Follow-Up (no cardiac symptoms) and Coronary Artery Disease  Coronary Calcification noted on high-resolution CT scan  Negative Lexiscan 10/16  2-D echo showed   1. Limited visualization due to body habitus   2. Bi-atrial enlargement   3. Moderately enlarged left ventricular cavity dimensions   4. Mild concentric LVH   5. Normal LV systolic function (estimated EF 55 -60 %)   6. Enlarged RV with reduced function     Non-insulin-dependent diabetic on appropriate medication. Has not had heart catheterization    Subjective  Chandni Fountain denies exertional chest pain, shortness of breath, orthopnea, paroxysmal nocturnal dyspnea, syncope. He has ELROY and wears CPAP. No presyncope, arrhythmia, edema and fatigue. The patient denies numbness or weakness to suggest cerebrovascular accident or transient ischemic attack. Pepe Hearn MD is PCP and follows labs including lipids- just started on Fish Oil. He has recently lost about 20 lbs with diet and exercise.    Hubert Dandy [facility] the [facility] [place]    Patient Active Problem List    Diagnosis Date Noted    Sleep apnea, obstructive 2017    Coronary atherosclerosis due to calcified coronary lesion of native artery     Hypertension     Obesity     ELROY on CPAP      Past Medical History:   Diagnosis Date    Allergic rhinitis     Clark esophagus     BPH (benign prostatic hyperplasia)     Chronic fatigue syndrome     COPD (chronic obstructive pulmonary disease) (HCC)     Coronary atherosclerosis due to calcified coronary lesion of native artery     Gastritis     GERD (gastroesophageal reflux disease)     Glucose intolerance (impaired glucose tolerance)     History of knee sprain     Hyperlipidemia     Hypertension     Non-insulin dependent type 2 diabetes mellitus ([facility] [address]     Obesity     ELROY on CPAP     Tremor      Past Surgical History:   Procedure Laterality Date    BARIATRIC SURGERY  02/09/2005    COLONOSCOPY  05/22/2006    ELBOW SURGERY Right 08/21/2013    TOTAL KNEE ARTHROPLASTY  03/18/2013    UPPER GASTROINTESTINAL ENDOSCOPY  2002    UPPER GASTROINTESTINAL ENDOSCOPY  04/19/2012     Family History   Problem Relation Age of Onset    Heart Attack Mother     Heart Disease Mother     Stroke Father     Cancer Father      Social History   Substance Use Topics    Smoking status: Never Smoker    Smokeless tobacco: Never Used    Alcohol use 3.6 oz/week     6 Shots of liquor per week      Current Outpatient Prescriptions   Medication Sig Dispense Refill    budesonide-formoterol (SYMBICORT) 160-4.5 MCG/ACT AERO Inhale 2 puffs into the lungs 2 times daily      aclidinium (TUDORZA PRESSAIR) 400 MCG/ACT AEPB inhaler Inhale 1 puff into the lungs 2 times daily      aspirin 81 MG tablet Take 81 mg by mouth daily      propranolol (INDERAL LA) 120 MG extended release capsule Take 120 mg by mouth daily      albuterol sulfate HFA (PROAIR HFA) 108 (90 BASE) MCG/ACT inhaler Inhale 2 puffs into the lungs every 6 hours as needed for Wheezing      EPINEPHrine (EPIPEN 2-KEENA) 0.3 MG/0.3ML SOAJ injection Inject 0.3 mg into the muscle as needed Use as directed for allergic reaction      metFORMIN (GLUCOPHAGE-XR) 750 MG extended release tablet Take 750 mg by mouth daily (with breakfast)      sitaGLIPtin (JANUVIA) 100 MG tablet Take 100 mg by mouth daily      azelastine HCl 0.15 % SOLN by Nasal route      fluticasone (FLONASE) 50 MCG/ACT nasal spray 1 spray by Nasal route daily      lansoprazole (PREVACID) 30 MG delayed release capsule Take 30 mg by mouth daily      vitamin D (ERGOCALCIFEROL) 95883 UNITS CAPS capsule Take 50,000 Units by mouth once a week       ferrous sulfate 325 (65 FE) MG tablet Take 325 mg by mouth daily (with breakfast)       FLUoxetine (PROZAC) 20 MG capsule Take 20 mg by mouth daily       hydrochlorothiazide (HYDRODIURIL) 12.5 MG tablet Take 12.5 mg by mouth daily       levocetirizine (XYZAL) 5 MG tablet Take 5 mg by mouth nightly       pramipexole (MIRAPEX) 0.125 MG tablet Take 0.125 mg by mouth 2 times daily       ranitidine (ZANTAC) 150 MG tablet Take 150 mg by mouth nightly       timolol (TIMOPTIC-XE) 0.5 % ophthalmic gel-forming Place 1 drop into both eyes nightly        No current facility-administered medications for this visit. Allergies: Iodine; Pcn [penicillins]; and Sulfa antibiotics    Review of Systems  Constitutional - no significant activity change, appetite change, or unexpected weight change. No fever, chills or diaphoresis. No fatigue. HEENT - no significant rhinorrhea or epistaxis. No tinnitus or significant hearing loss. Eyes - no sudden vision change or amaurosis. Respiratory - no significant wheezing, stridor, apnea or cough. No dyspnea on exertion or shortness of breath. Cardiovascular - no exertional chest pain, orthopnea or PND. No sensation of arrhythmia or slow heart rate. No claudication or leg edema. Gastrointestinal - no abdominal swelling or pain. No blood in stool. No severe constipation, diarrhea, nausea, or vomiting. Genitourinary - no difficulty urinating, dysuria, frequency, or urgency. No flank pain or hematuria. Musculoskeletal - no back pain, gait disturbance, or myalgia. Skin - no color change or rash. No pallor. No new surgical incision. Neurologic - no speech difficulty, facial asymmetry or lateralizing weakness. No seizures, presyncope, syncope, or significant dizziness. Hematologic - no easy bruising or excessive bleeding. Psychiatric - no severe anxiety or insomnia. No confusion. All other review of systems are negative.       Objective  Vital Signs - /72   Pulse 60

## 2018-09-24 ENCOUNTER — OFFICE VISIT (OUTPATIENT)
Dept: NEUROLOGY | Age: 68
End: 2018-09-24
Payer: MEDICARE

## 2018-09-24 VITALS
DIASTOLIC BLOOD PRESSURE: 75 MMHG | HEIGHT: 74 IN | WEIGHT: 310 LBS | HEART RATE: 63 BPM | BODY MASS INDEX: 39.78 KG/M2 | SYSTOLIC BLOOD PRESSURE: 104 MMHG

## 2018-09-24 DIAGNOSIS — G47.33 SLEEP APNEA, OBSTRUCTIVE: Primary | ICD-10-CM

## 2018-09-24 PROCEDURE — G8598 ASA/ANTIPLAT THER USED: HCPCS | Performed by: PSYCHIATRY & NEUROLOGY

## 2018-09-24 PROCEDURE — 1123F ACP DISCUSS/DSCN MKR DOCD: CPT | Performed by: PSYCHIATRY & NEUROLOGY

## 2018-09-24 PROCEDURE — 4040F PNEUMOC VAC/ADMIN/RCVD: CPT | Performed by: PSYCHIATRY & NEUROLOGY

## 2018-09-24 PROCEDURE — 99213 OFFICE O/P EST LOW 20 MIN: CPT | Performed by: PSYCHIATRY & NEUROLOGY

## 2018-09-24 PROCEDURE — 1101F PT FALLS ASSESS-DOCD LE1/YR: CPT | Performed by: PSYCHIATRY & NEUROLOGY

## 2018-09-24 PROCEDURE — G8427 DOCREV CUR MEDS BY ELIG CLIN: HCPCS | Performed by: PSYCHIATRY & NEUROLOGY

## 2018-09-24 PROCEDURE — G8417 CALC BMI ABV UP PARAM F/U: HCPCS | Performed by: PSYCHIATRY & NEUROLOGY

## 2018-09-24 PROCEDURE — 1036F TOBACCO NON-USER: CPT | Performed by: PSYCHIATRY & NEUROLOGY

## 2018-09-24 PROCEDURE — 3017F COLORECTAL CA SCREEN DOC REV: CPT | Performed by: PSYCHIATRY & NEUROLOGY

## 2019-01-28 ENCOUNTER — OFFICE VISIT (OUTPATIENT)
Dept: CARDIOLOGY | Age: 69
End: 2019-01-28
Payer: MEDICARE

## 2019-01-28 VITALS
HEART RATE: 68 BPM | DIASTOLIC BLOOD PRESSURE: 78 MMHG | HEIGHT: 74 IN | BODY MASS INDEX: 38.89 KG/M2 | WEIGHT: 303 LBS | SYSTOLIC BLOOD PRESSURE: 138 MMHG

## 2019-01-28 DIAGNOSIS — E11.8 TYPE 2 DIABETES MELLITUS WITH COMPLICATION, WITHOUT LONG-TERM CURRENT USE OF INSULIN (HCC): ICD-10-CM

## 2019-01-28 DIAGNOSIS — E66.09 CLASS 2 OBESITY DUE TO EXCESS CALORIES WITHOUT SERIOUS COMORBIDITY WITH BODY MASS INDEX (BMI) OF 38.0 TO 38.9 IN ADULT: ICD-10-CM

## 2019-01-28 DIAGNOSIS — I25.10 CORONARY ATHEROSCLEROSIS DUE TO CALCIFIED CORONARY LESION OF NATIVE ARTERY: Primary | ICD-10-CM

## 2019-01-28 DIAGNOSIS — I25.84 CORONARY ATHEROSCLEROSIS DUE TO CALCIFIED CORONARY LESION OF NATIVE ARTERY: Primary | ICD-10-CM

## 2019-01-28 DIAGNOSIS — I10 ESSENTIAL HYPERTENSION: ICD-10-CM

## 2019-01-28 DIAGNOSIS — I51.7 RIGHT VENTRICULAR ENLARGEMENT: ICD-10-CM

## 2019-01-28 DIAGNOSIS — G47.33 SLEEP APNEA, OBSTRUCTIVE: ICD-10-CM

## 2019-01-28 DIAGNOSIS — I51.7 BIATRIAL ENLARGEMENT: ICD-10-CM

## 2019-01-28 PROCEDURE — 99213 OFFICE O/P EST LOW 20 MIN: CPT | Performed by: CLINICAL NURSE SPECIALIST

## 2019-01-28 PROCEDURE — G8427 DOCREV CUR MEDS BY ELIG CLIN: HCPCS | Performed by: CLINICAL NURSE SPECIALIST

## 2019-01-28 PROCEDURE — 4040F PNEUMOC VAC/ADMIN/RCVD: CPT | Performed by: CLINICAL NURSE SPECIALIST

## 2019-01-28 PROCEDURE — 2022F DILAT RTA XM EVC RTNOPTHY: CPT | Performed by: CLINICAL NURSE SPECIALIST

## 2019-01-28 PROCEDURE — 1123F ACP DISCUSS/DSCN MKR DOCD: CPT | Performed by: CLINICAL NURSE SPECIALIST

## 2019-01-28 PROCEDURE — 3046F HEMOGLOBIN A1C LEVEL >9.0%: CPT | Performed by: CLINICAL NURSE SPECIALIST

## 2019-01-28 PROCEDURE — 93000 ELECTROCARDIOGRAM COMPLETE: CPT | Performed by: CLINICAL NURSE SPECIALIST

## 2019-01-28 PROCEDURE — 1036F TOBACCO NON-USER: CPT | Performed by: CLINICAL NURSE SPECIALIST

## 2019-01-28 PROCEDURE — 3017F COLORECTAL CA SCREEN DOC REV: CPT | Performed by: CLINICAL NURSE SPECIALIST

## 2019-01-28 PROCEDURE — G8484 FLU IMMUNIZE NO ADMIN: HCPCS | Performed by: CLINICAL NURSE SPECIALIST

## 2019-01-28 PROCEDURE — G8598 ASA/ANTIPLAT THER USED: HCPCS | Performed by: CLINICAL NURSE SPECIALIST

## 2019-01-28 PROCEDURE — 1101F PT FALLS ASSESS-DOCD LE1/YR: CPT | Performed by: CLINICAL NURSE SPECIALIST

## 2019-01-28 PROCEDURE — G8417 CALC BMI ABV UP PARAM F/U: HCPCS | Performed by: CLINICAL NURSE SPECIALIST

## 2019-01-28 ASSESSMENT — ENCOUNTER SYMPTOMS
NAUSEA: 0
COUGH: 0
SHORTNESS OF BREATH: 0
VOMITING: 0
EYE REDNESS: 0
WHEEZING: 0
FACIAL SWELLING: 0
ABDOMINAL PAIN: 0
CHEST TIGHTNESS: 0

## 2019-05-22 ENCOUNTER — HOSPITAL ENCOUNTER (OUTPATIENT)
Dept: GENERAL RADIOLOGY | Facility: HOSPITAL | Age: 69
Discharge: HOME | End: 2019-05-22

## 2019-05-22 PROCEDURE — 71046 X-RAY EXAM CHEST 2 VIEWS: CPT

## 2019-06-03 PROBLEM — I51.7 RIGHT VENTRICULAR ENLARGEMENT: Status: ACTIVE | Noted: 2019-01-28

## 2019-06-03 PROBLEM — J44.9 STAGE 2 MODERATE COPD BY GOLD CLASSIFICATION (HCC): Status: ACTIVE | Noted: 2019-06-03

## 2019-06-03 PROBLEM — I25.84 CORONARY ATHEROSCLEROSIS DUE TO CALCIFIED CORONARY LESION OF NATIVE ARTERY: Status: ACTIVE | Noted: 2019-06-03

## 2019-06-03 PROBLEM — I25.10 CORONARY ATHEROSCLEROSIS DUE TO CALCIFIED CORONARY LESION OF NATIVE ARTERY: Status: ACTIVE | Noted: 2019-06-03

## 2019-06-03 PROBLEM — G47.33 OBSTRUCTIVE SLEEP APNEA SYNDROME: Status: ACTIVE | Noted: 2017-09-21

## 2019-06-04 ENCOUNTER — OFFICE VISIT (OUTPATIENT)
Dept: PULMONOLOGY | Facility: CLINIC | Age: 69
End: 2019-06-04

## 2019-06-04 VITALS
HEART RATE: 53 BPM | OXYGEN SATURATION: 97 % | SYSTOLIC BLOOD PRESSURE: 140 MMHG | DIASTOLIC BLOOD PRESSURE: 86 MMHG | HEIGHT: 74 IN | BODY MASS INDEX: 37.99 KG/M2 | WEIGHT: 296 LBS

## 2019-06-04 DIAGNOSIS — J44.9 STAGE 2 MODERATE COPD BY GOLD CLASSIFICATION (HCC): Primary | ICD-10-CM

## 2019-06-04 LAB
FEV1/FVC: NORMAL %
FEV1: NORMAL LITERS
FVC VOL RESPIRATORY: NORMAL LITERS

## 2019-06-04 PROCEDURE — 94010 BREATHING CAPACITY TEST: CPT | Performed by: INTERNAL MEDICINE

## 2019-06-04 PROCEDURE — 99212 OFFICE O/P EST SF 10 MIN: CPT | Performed by: INTERNAL MEDICINE

## 2019-06-04 NOTE — PROGRESS NOTES
Subjective   Wayne Linares is a 68 y.o. male.     Background: Patient with gold stage II COPD 2018, treated with Tudorza and Symbicort.    Chief Complaint   Patient presents with   • Shortness of Breath   • Cough   • Wheezing        History of Present Illness   Patient reports he uses inhalers every day.  He has been using Dulera and also Symbicort and I told him not to use both of those together.  He says he wheezes a lot and he also has been taking propranolol for blood pressure problems and for tremors.  He did take metoprolol in the past.  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  We discussed that result.  Patient has no other new complaints.  He has moderate intermittent shortness of breath in his chest on exertion alleviated by inhalers associated with wheeze.    Medical/Family/Social History   has a past medical history of Arthritis, Bundle branch block, right, COPD (chronic obstructive pulmonary disease) (CMS/HCC), Coronary artery disease, Diabetes mellitus (CMS/[facility]), Hypertension, Irregular heart beat, and Sleep apnea.   has a past surgical history that includes Knee surgery (Right); Gastric bypass; Cholecystectomy; Tonsillectomy; Nerve Surgery; Colonoscopy (05/07/2014); Esophagogastroduodenoscopy (05/07/2014); Esophagogastroduodenoscopy (N/A, 7/26/2017); and Colonoscopy (N/A, 8/1/2017).  family history includes Cancer in his brother and mother; Heart disease in his father.   reports that he has never smoked. He has never used smokeless tobacco. He reports that he drinks about 3.6 oz of alcohol per week. He reports that he does not use drugs.  Allergies   Allergen Reactions   • Iodides Anaphylaxis   • Shellfish-Derived Products Anaphylaxis     Sea foods   • Penicillins Rash   • Sulfa Antibiotics Rash     Medications    Current Outpatient Medications:   •  aclidinium  "bromide (TUDORZA PRESSAIR) 400 MCG/ACT aerosol powder  powder for inhalation, Inhale., Disp: , Rfl:   •  albuterol (PROVENTIL HFA;VENTOLIN HFA) 108 (90 BASE) MCG/ACT inhaler, Inhale., Disp: , Rfl:   •  aspirin 81 MG tablet, Take  by mouth., Disp: , Rfl:   •  azelastine (ASTEPRO) 0.15 % solution nasal spray, into each nostril., Disp: , Rfl:   •  EPINEPHrine (EPIPEN) 0.3 MG/0.3ML solution auto-injector injection, Inject  into the shoulder, thigh, or buttocks., Disp: , Rfl:   •  ferrous sulfate 325 (65 FE) MG tablet, Take  by mouth., Disp: , Rfl:   •  FLUoxetine (PROzac) 20 MG capsule, TAKE 1 CAPSULE BY MOUTH DAILY IN THE MORNING, Disp: , Rfl: 2  •  fluticasone (FLONASE) 50 MCG/ACT nasal spray, into each nostril., Disp: , Rfl:   •  hydrochlorothiazide (HYDRODIURIL) 12.5 MG tablet, , Disp: , Rfl:   •  Iron Combinations (IRON COMPLEX PO), Take  by mouth., Disp: , Rfl:   •  JANUVIA 100 MG tablet, TAKE 1 TABLET BY MOUTH EVERY DAY, Disp: , Rfl: 3  •  ketorolac (TORADOL) 10 MG tablet, Take  by mouth., Disp: , Rfl:   •  lansoprazole (PREVACID) 30 MG capsule, , Disp: , Rfl:   •  levocetirizine (XYZAL) 5 MG tablet, Take  by mouth., Disp: , Rfl:   •  propranolol LA (INDERAL LA) 120 MG 24 hr capsule, , Disp: , Rfl:   •  raNITIdine (ZANTAC) 150 MG tablet, TAKE 1 TABLET BY MOUTH TWO TIMES A DAY, Disp: , Rfl: 2  •  SYMBICORT 160-4.5 MCG/ACT inhaler, INHALE 2 PUFFS INTO LUNG TWO TIMES A DAY, Disp: , Rfl: 0  •  timolol (TIMOPTIC-XR) 0.5 % ophthalmic gel-forming, 1 SQUIRT IN BOTH EYES DAILY, Disp: , Rfl: 5  •  vitamin D (ERGOCALCIFEROL) 33007 UNITS capsule capsule, , Disp: , Rfl:     Review of Systems   Constitutional: Negative for chills and fever.   Cardiovascular: Negative for chest pain.   Gastrointestinal: Negative for nausea and vomiting.       Objective   /86   Pulse 53   Ht 188 cm (74\")   Wt 134 kg (296 lb)   SpO2 97% Comment: RA  BMI 38.00 kg/m²   Physical Exam   Constitutional: He appears well-developed and " well-nourished. He does not appear ill. No distress.   HENT:   Head: Normocephalic and atraumatic.   Nose: Nose normal.   Eyes: Conjunctivae and EOM are normal.   Neck: Neck supple.   Cardiovascular: Normal rate, regular rhythm, S1 normal and S2 normal.   Pulmonary/Chest: Effort normal. No accessory muscle usage. He has decreased breath sounds. He has no wheezes. He has no rales.   Abdominal: Soft. He exhibits no distension. There is no tenderness. There is no guarding.   Musculoskeletal: He exhibits no deformity.   Lymphadenopathy:     He has no cervical adenopathy.   Neurological: He is alert.   Skin: Skin is warm and dry. No rash noted.   Bruising on arms   Psychiatric: He has a normal mood and affect.     -----------------------------------------------------------------------------------------------  Recent Imaging:  Xr Chest Pa & Lateral    Result Date: 5/22/2019  Impression: 1. Mild changes COPD with no acute cardiopulmonary process.   This report was finalized on 05/22/2019 15:18 by Dr. Drew Aguirre MD.     -----------------------------------------------------------------------------------------------  Pulmonary Functions Testing Results:  FEV1   Date Value Ref Range Status   06/04/2019 78% liters Final     FVC   Date Value Ref Range Status   06/04/2019 88% liters Final     FEV1/FVC   Date Value Ref Range Status   06/04/2019 70.46% % Final      My interpretation of PFT: borderline spirometry with borderline obstruction, improved since prior study.  -----------------------------------------------------------------------------------------------  Assessment/Plan   Problem List Items Addressed This Visit        Respiratory    Stage 2 moderate COPD by GOLD classification (CMS/[facility]) - Primary    Overview     fev1 68% pred 2018         Relevant Orders    Pulmonary Function Test (Completed)        Patient's Body mass index is 38 kg/m². BMI is above normal parameters. Recommendations include: referral to primary  care.      pft are improved.  Continue dulera.  Discussed pft result.  Continue rescue inhaler as needed.       Electronically signed by Laron Sparrow MD, 6/4/2019, 9:37 PM

## 2019-08-01 ENCOUNTER — TELEPHONE (OUTPATIENT)
Dept: CARDIOLOGY | Age: 69
End: 2019-08-01

## 2019-08-02 ENCOUNTER — OFFICE VISIT (OUTPATIENT)
Dept: CARDIOLOGY | Age: 69
End: 2019-08-02
Payer: MEDICARE

## 2019-08-02 VITALS
DIASTOLIC BLOOD PRESSURE: 76 MMHG | WEIGHT: 292 LBS | HEIGHT: 74 IN | BODY MASS INDEX: 37.47 KG/M2 | HEART RATE: 80 BPM | SYSTOLIC BLOOD PRESSURE: 110 MMHG

## 2019-08-02 DIAGNOSIS — I10 HYPERTENSION, UNSPECIFIED TYPE: Primary | ICD-10-CM

## 2019-08-02 PROCEDURE — 99203 OFFICE O/P NEW LOW 30 MIN: CPT | Performed by: INTERNAL MEDICINE

## 2019-08-02 RX ORDER — ATORVASTATIN CALCIUM 10 MG/1
10 TABLET, FILM COATED ORAL DAILY
Qty: 90 TABLET | Refills: 3 | Status: SHIPPED | OUTPATIENT
Start: 2019-08-02 | End: 2019-08-21 | Stop reason: ALTCHOICE

## 2019-08-02 ASSESSMENT — ENCOUNTER SYMPTOMS
COUGH: 0
ABDOMINAL DISTENTION: 0
VOMITING: 0
ABDOMINAL PAIN: 0
BACK PAIN: 0
WHEEZING: 0
DIARRHEA: 0
BLOOD IN STOOL: 0
SHORTNESS OF BREATH: 0

## 2019-08-02 NOTE — PROGRESS NOTES
[facility]  Cardiology Office Note  [id number]  Phone: [phone]  Fax: [phone]                            Date:  8/2/2019  Patient: Lisbeth Garcia  Age:  80 y.o., 1950    Referral: No ref. provider found      PROBLEM LIST:    Patient Active Problem List    Diagnosis Date Noted    Right ventricular enlargement 01/28/2019     Priority: Low    Biatrial enlargement 01/28/2019     Priority: Low    Type 2 diabetes mellitus with complication, without long-term current use of insulin ([facility] [address] 01/28/2019     Priority: Low    Sleep apnea, obstructive 09/21/2017     Priority: Low    Coronary atherosclerosis due to calcified coronary lesion of native artery      Priority: Low    Hypertension      Priority: Low    Class 2 obesity due to excess calories without serious comorbidity with body mass index (BMI) of 38.0 to 38.9 in adult      Priority: Low    ELROY on CPAP      Priority: Low     1. Coronary atherosclerosis based on CT scan with coronary calcification. 2.  Hypertension, mild LVH, normal LV ejection fraction, EKG with right bundle branch block. 3.  Diabetes mellitus. 4.  Obesity with prior history of bariatric surgery. 5.  Essential tremor. PRESENTATION: Lisbeth Garcia is a 80y.o. year old male presents for follow-up evaluation. He has been doing well with no complaints of chest pain or shortness of breath. He is able to do all his activities without restriction. He has been trying to lose some weight this year. He does not use tobacco.    REVIEW OF SYSTEMS:  Review of Systems   Constitutional: Negative for activity change, diaphoresis and fatigue. HENT: Negative for hearing loss, nosebleeds and tinnitus. Eyes: Negative for visual disturbance. Respiratory: Negative for cough, shortness of breath and wheezing. Cardiovascular: Negative for chest pain, palpitations and leg swelling.    Gastrointestinal: Negative for abdominal distention, abdominal pain, blood in stool, diarrhea and vomiting. Endocrine: Negative for cold intolerance, heat intolerance, polydipsia, polyphagia and polyuria. Genitourinary: Negative for difficulty urinating, flank pain and hematuria. Musculoskeletal: Negative for arthralgias, back pain, joint swelling and myalgias. Skin: Negative for pallor and rash. Neurological: Negative for dizziness, seizures, syncope and headaches. Psychiatric/Behavioral: Negative for behavioral problems and dysphoric mood. The patient is not nervous/anxious.         Past Medical History:      Diagnosis Date    Allergic rhinitis     Clark esophagus     BPH (benign prostatic hyperplasia)     Chronic fatigue syndrome     COPD (chronic obstructive pulmonary disease) (HCC)     Coronary atherosclerosis due to calcified coronary lesion of native artery     Gastritis     GERD (gastroesophageal reflux disease)     Glucose intolerance (impaired glucose tolerance)     History of knee sprain     Hyperlipidemia     Hypertension     Kidney stones     Non-insulin dependent type 2 diabetes mellitus ([facility] [address]     Obesity     ELROY on CPAP     Tremor        Past Surgical History:      Procedure Laterality Date    BARIATRIC SURGERY  02/09/2005    CARDIAC CATHETERIZATION  1998    no blockages    COLONOSCOPY  05/22/2006    ELBOW SURGERY Right 08/21/2013    TOTAL KNEE ARTHROPLASTY  03/18/2013    UPPER GASTROINTESTINAL ENDOSCOPY  2002    UPPER GASTROINTESTINAL ENDOSCOPY  04/19/2012       Medications:  Current Outpatient Medications   Medication Sig Dispense Refill    sitaGLIPtan-metformin (JANUMET)  MG per tablet Take 1 tablet by mouth daily      budesonide-formoterol (SYMBICORT) 160-4.5 MCG/ACT AERO Inhale 2 puffs into the lungs 2 times daily      aclidinium (TUDORZA PRESSAIR) 400 MCG/ACT AEPB inhaler Inhale 1 puff into the lungs 2 times daily      aspirin 81 MG tablet Take 81 mg by mouth daily     

## 2019-08-21 ENCOUNTER — TELEPHONE (OUTPATIENT)
Dept: CARDIOLOGY | Age: 69
End: 2019-08-21

## 2019-08-21 RX ORDER — PRAVASTATIN SODIUM 20 MG
20 TABLET ORAL DAILY
Qty: 90 TABLET | Refills: 1 | Status: SHIPPED | OUTPATIENT
Start: 2019-08-21 | End: 2020-02-12

## 2019-09-06 ENCOUNTER — TELEPHONE (OUTPATIENT)
Dept: CARDIOLOGY | Age: 69
End: 2019-09-06

## 2019-09-06 NOTE — TELEPHONE ENCOUNTER
Patient was switched to pravastatin from lipitor on 8/21/19. He states the s/e are much better with pravastatin but he has trouble with fatigue, slight chest discomfort, and dizziness. All s/e started with pravastatin. Advised if chest discomfort persists/worsens to report to ED and he said, \"It isnt that kind of chest discomfort\".

## 2019-09-24 ENCOUNTER — OFFICE VISIT (OUTPATIENT)
Dept: NEUROLOGY | Age: 69
End: 2019-09-24
Payer: MEDICARE

## 2019-09-24 VITALS
HEIGHT: 74 IN | WEIGHT: 294.2 LBS | RESPIRATION RATE: 18 BRPM | DIASTOLIC BLOOD PRESSURE: 73 MMHG | HEART RATE: 61 BPM | BODY MASS INDEX: 37.76 KG/M2 | SYSTOLIC BLOOD PRESSURE: 111 MMHG

## 2019-09-24 DIAGNOSIS — G25.0 ESSENTIAL TREMOR: ICD-10-CM

## 2019-09-24 DIAGNOSIS — G47.33 SLEEP APNEA, OBSTRUCTIVE: Primary | ICD-10-CM

## 2019-09-24 PROCEDURE — 99213 OFFICE O/P EST LOW 20 MIN: CPT | Performed by: PSYCHIATRY & NEUROLOGY

## 2019-09-24 PROCEDURE — G8417 CALC BMI ABV UP PARAM F/U: HCPCS | Performed by: PSYCHIATRY & NEUROLOGY

## 2019-09-24 PROCEDURE — 1036F TOBACCO NON-USER: CPT | Performed by: PSYCHIATRY & NEUROLOGY

## 2019-09-24 PROCEDURE — 1123F ACP DISCUSS/DSCN MKR DOCD: CPT | Performed by: PSYCHIATRY & NEUROLOGY

## 2019-09-24 PROCEDURE — 4040F PNEUMOC VAC/ADMIN/RCVD: CPT | Performed by: PSYCHIATRY & NEUROLOGY

## 2019-09-24 PROCEDURE — G8598 ASA/ANTIPLAT THER USED: HCPCS | Performed by: PSYCHIATRY & NEUROLOGY

## 2019-09-24 PROCEDURE — 3017F COLORECTAL CA SCREEN DOC REV: CPT | Performed by: PSYCHIATRY & NEUROLOGY

## 2019-09-24 PROCEDURE — G8427 DOCREV CUR MEDS BY ELIG CLIN: HCPCS | Performed by: PSYCHIATRY & NEUROLOGY

## 2019-09-24 NOTE — PROGRESS NOTES
(XYZAL) 5 MG tablet Take 5 mg by mouth nightly       ranitidine (ZANTAC) 150 MG tablet Take 150 mg by mouth nightly       timolol (TIMOPTIC-XE) 0.5 % ophthalmic gel-forming Place 1 drop into both eyes nightly        No current facility-administered medications for this visit. Allergies:   Allergies as of 09/24/2019 - Review Complete 09/24/2019   Allergen Reaction Noted    Iodine  12/07/2016    Pcn [penicillins]  12/07/2016    Sulfa antibiotics  12/07/2016       Review of Systems:  General ROS: negative for - chills or fever  Psychological ROS: negative for - anxiety or depression  ENT ROS: negative for - headaches or sinus pain  Hematological and Lymphatic ROS: negative for - bleeding problems, bruising or swollen lymph nodes  Respiratory ROS: negative for - cough, hemoptysis or shortness of breath  Cardiovascular ROS: negative for - chest pain or palpitations  Gastrointestinal ROS: negative for - blood in stools, constipation, diarrhea or nausea/vomiting  Genito-Urinary ROS: negative for - hematuria or urinary frequency/urgency  Musculoskeletal ROS: negative for - joint pain, joint stiffness or joint swelling  Neurological ROS: negative for - memory loss, numbness/tingling or weakness     Examination:  Vitals:  /73   Pulse 61   Resp 18   Ht 6' 2\" (1.88 m)   Wt 294 lb 3.2 oz (133.4 kg)   BMI 37.77 kg/m²   General appearance:  alert and cooperative with exam  HEENT:  Sclera clear, anicteric, Oropharynx clear, no lesions, Neck supple with midline trachea, Thyroid without masses and Trachea midline  Mallampati 3  Heart::  regular rate and rhythm, S1, S2 normal, no murmur, click, rub or gallop  Lungs:  clear to auscultation bilaterally  Extremities:  extremities normal, atraumatic, no cyanosis or edema  Neurologic:  Extraocular movements are intact without nystagmus.  Visual fields are full to confrontation.  Facial movements are symmetrical and normal.  Speech is precise.  Extremity strength is

## 2019-12-02 ENCOUNTER — TRANSCRIBE ORDERS (OUTPATIENT)
Dept: ADMINISTRATIVE | Facility: HOSPITAL | Age: 69
End: 2019-12-02

## 2019-12-02 DIAGNOSIS — R27.0 ATAXIA: ICD-10-CM

## 2019-12-02 DIAGNOSIS — R51.9 NONINTRACTABLE HEADACHE, UNSPECIFIED CHRONICITY PATTERN, UNSPECIFIED HEADACHE TYPE: ICD-10-CM

## 2019-12-02 DIAGNOSIS — R29.6 FREQUENT FALLS: ICD-10-CM

## 2019-12-02 DIAGNOSIS — R25.1 TREMOR: ICD-10-CM

## 2019-12-02 DIAGNOSIS — R06.02 SHORTNESS OF BREATH: Primary | ICD-10-CM

## 2019-12-02 DIAGNOSIS — R26.9 GAIT DISTURBANCE: ICD-10-CM

## 2019-12-04 ENCOUNTER — HOSPITAL ENCOUNTER (OUTPATIENT)
Dept: ULTRASOUND IMAGING | Facility: HOSPITAL | Age: 69
Discharge: HOME | End: 2019-12-04
Admitting: INTERNAL MEDICINE

## 2019-12-04 ENCOUNTER — HOSPITAL ENCOUNTER (OUTPATIENT)
Dept: MRI IMAGING | Facility: HOSPITAL | Age: 69
End: 2019-12-04

## 2019-12-04 ENCOUNTER — HOSPITAL ENCOUNTER (OUTPATIENT)
Dept: CARDIOLOGY | Facility: HOSPITAL | Age: 69
Discharge: HOME | End: 2019-12-04

## 2019-12-04 VITALS
HEIGHT: 74 IN | SYSTOLIC BLOOD PRESSURE: 140 MMHG | DIASTOLIC BLOOD PRESSURE: 86 MMHG | WEIGHT: 296 LBS | BODY MASS INDEX: 37.99 KG/M2

## 2019-12-04 PROCEDURE — 93306 TTE W/DOPPLER COMPLETE: CPT

## 2019-12-04 PROCEDURE — 93880 EXTRACRANIAL BILAT STUDY: CPT

## 2019-12-04 PROCEDURE — 93306 TTE W/DOPPLER COMPLETE: CPT | Performed by: INTERNAL MEDICINE

## 2019-12-04 PROCEDURE — 93880 EXTRACRANIAL BILAT STUDY: CPT | Performed by: SURGERY

## 2019-12-04 PROCEDURE — 25010000002 PERFLUTREN 6.52 MG/ML SUSPENSION: Performed by: INTERNAL MEDICINE

## 2019-12-04 RX ADMIN — PERFLUTREN 8.48 MG: 6.52 INJECTION, SUSPENSION INTRAVENOUS at 10:21

## 2019-12-05 ENCOUNTER — HOSPITAL ENCOUNTER (OUTPATIENT)
Dept: MRI IMAGING | Facility: HOSPITAL | Age: 69
Discharge: HOME | End: 2019-12-05
Admitting: FAMILY MEDICINE

## 2019-12-05 LAB
BH CV ECHO MEAS - AO MAX PG (FULL): 3.1 MMHG
BH CV ECHO MEAS - AO MAX PG: 8.4 MMHG
BH CV ECHO MEAS - AO MEAN PG (FULL): 1 MMHG
BH CV ECHO MEAS - AO MEAN PG: 4 MMHG
BH CV ECHO MEAS - AO ROOT AREA (BSA CORRECTED): 1.7
BH CV ECHO MEAS - AO ROOT AREA: 14.5 CM^2
BH CV ECHO MEAS - AO ROOT DIAM: 4.3 CM
BH CV ECHO MEAS - AO V2 MAX: 145 CM/SEC
BH CV ECHO MEAS - AO V2 MEAN: 87.1 CM/SEC
BH CV ECHO MEAS - AO V2 VTI: 28.4 CM
BH CV ECHO MEAS - AVA(I,A): 3.6 CM^2
BH CV ECHO MEAS - AVA(I,D): 3.6 CM^2
BH CV ECHO MEAS - AVA(V,A): 2.7 CM^2
BH CV ECHO MEAS - AVA(V,D): 2.7 CM^2
BH CV ECHO MEAS - BSA(HAYCOCK): 2.7 M^2
BH CV ECHO MEAS - BSA: 2.6 M^2
BH CV ECHO MEAS - BZI_BMI: 38 KILOGRAMS/M^2
BH CV ECHO MEAS - BZI_METRIC_HEIGHT: 188 CM
BH CV ECHO MEAS - BZI_METRIC_WEIGHT: 134.3 KG
BH CV ECHO MEAS - EDV(CUBED): 136.6 ML
BH CV ECHO MEAS - EDV(MOD-SP4): 159 ML
BH CV ECHO MEAS - EDV(TEICH): 126.6 ML
BH CV ECHO MEAS - EF(CUBED): 72.2 %
BH CV ECHO MEAS - EF(MOD-SP4): 67.2 %
BH CV ECHO MEAS - EF(TEICH): 63.6 %
BH CV ECHO MEAS - ESV(CUBED): 37.9 ML
BH CV ECHO MEAS - ESV(MOD-SP4): 52.1 ML
BH CV ECHO MEAS - ESV(TEICH): 46.1 ML
BH CV ECHO MEAS - FS: 34.8 %
BH CV ECHO MEAS - IVS/LVPW: 0.91
BH CV ECHO MEAS - IVSD: 1.2 CM
BH CV ECHO MEAS - LA DIMENSION: 5.1 CM
BH CV ECHO MEAS - LA/AO: 1.2
BH CV ECHO MEAS - LAT PEAK E' VEL: 7.5 CM/SEC
BH CV ECHO MEAS - LV DIASTOLIC VOL/BSA (35-75): 61.9 ML/M^2
BH CV ECHO MEAS - LV MASS(C)D: 262.4 GRAMS
BH CV ECHO MEAS - LV MASS(C)DI: 102.2 GRAMS/M^2
BH CV ECHO MEAS - LV MAX PG: 5.3 MMHG
BH CV ECHO MEAS - LV MEAN PG: 3 MMHG
BH CV ECHO MEAS - LV SYSTOLIC VOL/BSA (12-30): 20.3 ML/M^2
BH CV ECHO MEAS - LV V1 MAX: 115 CM/SEC
BH CV ECHO MEAS - LV V1 MEAN: 74.5 CM/SEC
BH CV ECHO MEAS - LV V1 VTI: 29.4 CM
BH CV ECHO MEAS - LVIDD: 5.2 CM
BH CV ECHO MEAS - LVIDS: 3.4 CM
BH CV ECHO MEAS - LVLD AP4: 9.1 CM
BH CV ECHO MEAS - LVLS AP4: 7.4 CM
BH CV ECHO MEAS - LVOT AREA (M): 3.5 CM^2
BH CV ECHO MEAS - LVOT AREA: 3.5 CM^2
BH CV ECHO MEAS - LVOT DIAM: 2.1 CM
BH CV ECHO MEAS - LVPWD: 1.3 CM
BH CV ECHO MEAS - MED PEAK E' VEL: 4.8 CM/SEC
BH CV ECHO MEAS - MV A MAX VEL: 85.9 CM/SEC
BH CV ECHO MEAS - MV DEC SLOPE: 478 CM/SEC^2
BH CV ECHO MEAS - MV DEC TIME: 0.25 SEC
BH CV ECHO MEAS - MV E MAX VEL: 89.7 CM/SEC
BH CV ECHO MEAS - MV E/A: 1
BH CV ECHO MEAS - MV P1/2T MAX VEL: 111 CM/SEC
BH CV ECHO MEAS - MV P1/2T: 68 MSEC
BH CV ECHO MEAS - MVA P1/2T LCG: 2 CM^2
BH CV ECHO MEAS - MVA(P1/2T): 3.2 CM^2
BH CV ECHO MEAS - SI(AO): 160.7 ML/M^2
BH CV ECHO MEAS - SI(CUBED): 38.4 ML/M^2
BH CV ECHO MEAS - SI(LVOT): 39.7 ML/M^2
BH CV ECHO MEAS - SI(MOD-SP4): 41.6 ML/M^2
BH CV ECHO MEAS - SI(TEICH): 31.4 ML/M^2
BH CV ECHO MEAS - SV(AO): 412.4 ML
BH CV ECHO MEAS - SV(CUBED): 98.7 ML
BH CV ECHO MEAS - SV(LVOT): 101.8 ML
BH CV ECHO MEAS - SV(MOD-SP4): 106.9 ML
BH CV ECHO MEAS - SV(TEICH): 80.5 ML
BH CV ECHO MEASUREMENTS AVERAGE E/E' RATIO: 14.59
CREAT BLDA-MCNC: 1.2 MG/DL (ref 0.6–1.3)
LEFT ATRIUM VOLUME INDEX: 38.2 ML/M2
MAXIMAL PREDICTED HEART RATE: 152 BPM
STRESS TARGET HR: 129 BPM

## 2019-12-05 PROCEDURE — 82565 ASSAY OF CREATININE: CPT

## 2019-12-05 PROCEDURE — 0 GADOBENATE DIMEGLUMINE 529 MG/ML SOLUTION: Performed by: FAMILY MEDICINE

## 2019-12-05 PROCEDURE — 70553 MRI BRAIN STEM W/O & W/DYE: CPT

## 2019-12-05 PROCEDURE — A9577 INJ MULTIHANCE: HCPCS | Performed by: FAMILY MEDICINE

## 2019-12-05 RX ADMIN — GADOBENATE DIMEGLUMINE 20 ML: 529 INJECTION, SOLUTION INTRAVENOUS at 09:40

## 2019-12-06 ENCOUNTER — HOSPITAL ENCOUNTER (OUTPATIENT)
Dept: NON INVASIVE DIAGNOSTICS | Age: 69
Discharge: HOME | End: 2019-12-06
Payer: MEDICARE

## 2019-12-06 PROCEDURE — 93227 XTRNL ECG REC<48 HR R&I: CPT | Performed by: INTERNAL MEDICINE

## 2019-12-06 PROCEDURE — 93226 XTRNL ECG REC<48 HR SCAN A/R: CPT

## 2019-12-06 PROCEDURE — 93225 XTRNL ECG REC<48 HRS REC: CPT

## 2019-12-11 ENCOUNTER — HOSPITAL ENCOUNTER (OUTPATIENT)
Dept: NON INVASIVE DIAGNOSTICS | Age: 69
Discharge: HOME | End: 2019-12-11
Payer: MEDICARE

## 2019-12-11 ENCOUNTER — HOSPITAL ENCOUNTER (OUTPATIENT)
Dept: NON INVASIVE DIAGNOSTICS | Age: 69
End: 2019-12-11
Payer: MEDICARE

## 2019-12-11 PROCEDURE — 93660 TILT TABLE EVALUATION: CPT

## 2019-12-11 PROCEDURE — 93660 TILT TABLE EVALUATION: CPT | Performed by: INTERNAL MEDICINE

## 2020-01-02 ENCOUNTER — HOSPITAL ENCOUNTER (OUTPATIENT)
Dept: NON INVASIVE DIAGNOSTICS | Age: 70
Discharge: HOME | DRG: 287 | End: 2020-01-02
Payer: MEDICARE

## 2020-01-02 VITALS
DIASTOLIC BLOOD PRESSURE: 80 MMHG | SYSTOLIC BLOOD PRESSURE: 160 MMHG | HEART RATE: 87 BPM | WEIGHT: 300 LBS | BODY MASS INDEX: 38.52 KG/M2

## 2020-01-02 PROCEDURE — 6360000002 HC RX W HCPCS: Performed by: FAMILY MEDICINE

## 2020-01-02 PROCEDURE — 2580000003 HC RX 258: Performed by: INTERNAL MEDICINE

## 2020-01-02 PROCEDURE — 6360000004 HC RX CONTRAST MEDICATION: Performed by: INTERNAL MEDICINE

## 2020-01-02 PROCEDURE — C8928 TTE W OR W/O FOL W/CON,STRES: HCPCS

## 2020-01-02 RX ORDER — NITROGLYCERIN 0.4 MG/1
0.4 TABLET SUBLINGUAL EVERY 5 MIN PRN
Status: CANCELLED | OUTPATIENT
Start: 2020-01-02 | End: 2020-01-02

## 2020-01-02 RX ORDER — METOPROLOL TARTRATE 5 MG/5ML
5 INJECTION INTRAVENOUS EVERY 5 MIN PRN
Status: CANCELLED | OUTPATIENT
Start: 2020-01-02 | End: 2020-01-02

## 2020-01-02 RX ORDER — SODIUM CHLORIDE 9 MG/ML
500 INJECTION, SOLUTION INTRAVENOUS CONTINUOUS PRN
Status: CANCELLED | OUTPATIENT
Start: 2020-01-02 | End: 2020-01-02

## 2020-01-02 RX ORDER — SODIUM CHLORIDE 0.9 % (FLUSH) 0.9 %
10 SYRINGE (ML) INJECTION PRN
Status: CANCELLED | OUTPATIENT
Start: 2020-01-02 | End: 2020-01-02

## 2020-01-02 RX ORDER — ATROPINE SULFATE 0.1 MG/ML
0.5 INJECTION INTRAVENOUS EVERY 5 MIN PRN
Status: CANCELLED | OUTPATIENT
Start: 2020-01-02 | End: 2020-01-02

## 2020-01-02 RX ORDER — DOBUTAMINE HYDROCHLORIDE 200 MG/100ML
10 INJECTION INTRAVENOUS CONTINUOUS PRN
Status: DISCONTINUED | OUTPATIENT
Start: 2020-01-02 | End: 2020-01-03 | Stop reason: HOSPADM

## 2020-01-02 RX ORDER — SODIUM CHLORIDE 9 MG/ML
INJECTION, SOLUTION INTRAVENOUS
Status: COMPLETED | OUTPATIENT
Start: 2020-01-02 | End: 2020-01-02

## 2020-01-02 RX ORDER — ALBUTEROL SULFATE 90 UG/1
2 AEROSOL, METERED RESPIRATORY (INHALATION) PRN
Status: CANCELLED | OUTPATIENT
Start: 2020-01-02 | End: 2020-01-02

## 2020-01-02 RX ORDER — DOBUTAMINE HYDROCHLORIDE 200 MG/100ML
10 INJECTION INTRAVENOUS CONTINUOUS
Status: DISCONTINUED | OUTPATIENT
Start: 2020-01-02 | End: 2020-01-02 | Stop reason: SDUPTHER

## 2020-01-02 RX ADMIN — DOBUTAMINE HYDROCHLORIDE 10 MCG/KG/MIN: 200 INJECTION INTRAVENOUS at 11:00

## 2020-01-02 RX ADMIN — PERFLUTREN 1.65 MG: 6.52 INJECTION, SUSPENSION INTRAVENOUS at 10:45

## 2020-01-02 RX ADMIN — SODIUM CHLORIDE: 9 INJECTION, SOLUTION INTRAVENOUS at 11:00

## 2020-01-03 ENCOUNTER — TELEPHONE (OUTPATIENT)
Dept: CARDIOLOGY | Age: 70
End: 2020-01-03

## 2020-01-03 ENCOUNTER — HOSPITAL ENCOUNTER (INPATIENT)
Age: 70
LOS: 3 days | Discharge: HOME | DRG: 287 | End: 2020-01-07
Attending: FAMILY MEDICINE | Admitting: FAMILY MEDICINE
Payer: MEDICARE

## 2020-01-03 PROBLEM — I47.20 V-TACH: Status: ACTIVE | Noted: 2020-01-03

## 2020-01-03 LAB
ALBUMIN SERPL-MCNC: 3.8 G/DL (ref 3.5–5.2)
ALP BLD-CCNC: 60 U/L (ref 40–130)
ALT SERPL-CCNC: 16 U/L (ref 5–41)
ANION GAP SERPL CALCULATED.3IONS-SCNC: 12 MMOL/L (ref 7–19)
AST SERPL-CCNC: 14 U/L (ref 5–40)
BASOPHILS ABSOLUTE: 0.1 K/UL (ref 0–0.2)
BASOPHILS RELATIVE PERCENT: 0.5 % (ref 0–1)
BILIRUB SERPL-MCNC: 0.4 MG/DL (ref 0.2–1.2)
BUN BLDV-MCNC: 13 MG/DL (ref 8–23)
CALCIUM SERPL-MCNC: 8.7 MG/DL (ref 8.8–10.2)
CHLORIDE BLD-SCNC: 96 MMOL/L (ref 98–111)
CO2: 26 MMOL/L (ref 22–29)
CREAT SERPL-MCNC: 0.9 MG/DL (ref 0.5–1.2)
EOSINOPHILS ABSOLUTE: 0.3 K/UL (ref 0–0.6)
EOSINOPHILS RELATIVE PERCENT: 3.1 % (ref 0–5)
GFR NON-AFRICAN AMERICAN: >60
GLUCOSE BLD-MCNC: 146 MG/DL (ref 74–109)
HCT VFR BLD CALC: 40.2 % (ref 42–52)
HEMOGLOBIN: 13.1 G/DL (ref 14–18)
IMMATURE GRANULOCYTES #: 0 K/UL
LYMPHOCYTES ABSOLUTE: 2.9 K/UL (ref 1.1–4.5)
LYMPHOCYTES RELATIVE PERCENT: 27.4 % (ref 20–40)
MAGNESIUM: 2.1 MG/DL (ref 1.6–2.4)
MCH RBC QN AUTO: 28.9 PG (ref 27–31)
MCHC RBC AUTO-ENTMCNC: 32.6 G/DL (ref 33–37)
MCV RBC AUTO: 88.7 FL (ref 80–94)
MONOCYTES ABSOLUTE: 0.7 K/UL (ref 0–0.9)
MONOCYTES RELATIVE PERCENT: 6.9 % (ref 0–10)
NEUTROPHILS ABSOLUTE: 6.6 K/UL (ref 1.5–7.5)
NEUTROPHILS RELATIVE PERCENT: 61.7 % (ref 50–65)
PDW BLD-RTO: 14.3 % (ref 11.5–14.5)
PLATELET # BLD: 236 K/UL (ref 130–400)
PMV BLD AUTO: 9.5 FL (ref 9.4–12.4)
POTASSIUM SERPL-SCNC: 4.3 MMOL/L (ref 3.5–5)
PRO-BNP: 377 PG/ML (ref 0–900)
RBC # BLD: 4.53 M/UL (ref 4.7–6.1)
SODIUM BLD-SCNC: 134 MMOL/L (ref 136–145)
TOTAL PROTEIN: 6.5 G/DL (ref 6.6–8.7)
TROPONIN: <0.01 NG/ML (ref 0–0.03)
TSH SERPL DL<=0.05 MIU/L-ACNC: 2.14 UIU/ML (ref 0.27–4.2)
WBC # BLD: 10.7 K/UL (ref 4.8–10.8)

## 2020-01-03 PROCEDURE — 85025 COMPLETE CBC W/AUTO DIFF WBC: CPT

## 2020-01-03 PROCEDURE — 84443 ASSAY THYROID STIM HORMONE: CPT

## 2020-01-03 PROCEDURE — 6370000000 HC RX 637 (ALT 250 FOR IP): Performed by: PHYSICIAN ASSISTANT

## 2020-01-03 PROCEDURE — 80053 COMPREHEN METABOLIC PANEL: CPT

## 2020-01-03 PROCEDURE — 36415 COLL VENOUS BLD VENIPUNCTURE: CPT

## 2020-01-03 PROCEDURE — G0379 DIRECT REFER HOSPITAL OBSERV: HCPCS

## 2020-01-03 PROCEDURE — 6360000002 HC RX W HCPCS: Performed by: PHYSICIAN ASSISTANT

## 2020-01-03 PROCEDURE — G0378 HOSPITAL OBSERVATION PER HR: HCPCS

## 2020-01-03 PROCEDURE — 83735 ASSAY OF MAGNESIUM: CPT

## 2020-01-03 PROCEDURE — 94640 AIRWAY INHALATION TREATMENT: CPT

## 2020-01-03 PROCEDURE — 83880 ASSAY OF NATRIURETIC PEPTIDE: CPT

## 2020-01-03 PROCEDURE — 84484 ASSAY OF TROPONIN QUANT: CPT

## 2020-01-03 RX ORDER — PANTOPRAZOLE SODIUM 20 MG/1
20 TABLET, DELAYED RELEASE ORAL
Status: DISCONTINUED | OUTPATIENT
Start: 2020-01-04 | End: 2020-01-07 | Stop reason: HOSPADM

## 2020-01-03 RX ORDER — FLUOXETINE HYDROCHLORIDE 20 MG/1
20 CAPSULE ORAL DAILY
Status: DISCONTINUED | OUTPATIENT
Start: 2020-01-03 | End: 2020-01-07 | Stop reason: HOSPADM

## 2020-01-03 RX ORDER — CETIRIZINE HYDROCHLORIDE 5 MG/1
5 TABLET ORAL DAILY
Status: DISCONTINUED | OUTPATIENT
Start: 2020-01-03 | End: 2020-01-07 | Stop reason: HOSPADM

## 2020-01-03 RX ORDER — FLUTICASONE PROPIONATE 50 MCG
1 SPRAY, SUSPENSION (ML) NASAL DAILY
Status: DISCONTINUED | OUTPATIENT
Start: 2020-01-03 | End: 2020-01-07 | Stop reason: HOSPADM

## 2020-01-03 RX ORDER — HYDROCHLOROTHIAZIDE 12.5 MG/1
12.5 CAPSULE, GELATIN COATED ORAL DAILY
Status: DISCONTINUED | OUTPATIENT
Start: 2020-01-03 | End: 2020-01-07 | Stop reason: HOSPADM

## 2020-01-03 RX ORDER — ALBUTEROL SULFATE 2.5 MG/3ML
2.5 SOLUTION RESPIRATORY (INHALATION) 4 TIMES DAILY
Status: DISCONTINUED | OUTPATIENT
Start: 2020-01-03 | End: 2020-01-04

## 2020-01-03 RX ORDER — PROPRANOLOL HCL 60 MG
120 CAPSULE, EXTENDED RELEASE 24HR ORAL DAILY
Status: DISCONTINUED | OUTPATIENT
Start: 2020-01-03 | End: 2020-01-07 | Stop reason: HOSPADM

## 2020-01-03 RX ORDER — PRAVASTATIN SODIUM 20 MG
20 TABLET ORAL DAILY
Status: DISCONTINUED | OUTPATIENT
Start: 2020-01-03 | End: 2020-01-07 | Stop reason: HOSPADM

## 2020-01-03 RX ORDER — BUDESONIDE 0.25 MG/2ML
0.25 INHALANT ORAL 2 TIMES DAILY
Status: DISCONTINUED | OUTPATIENT
Start: 2020-01-03 | End: 2020-01-04

## 2020-01-03 RX ORDER — FERROUS SULFATE 325(65) MG
325 TABLET ORAL
Status: DISCONTINUED | OUTPATIENT
Start: 2020-01-04 | End: 2020-01-07 | Stop reason: HOSPADM

## 2020-01-03 RX ORDER — ASPIRIN 81 MG/1
81 TABLET, CHEWABLE ORAL DAILY
Status: DISCONTINUED | OUTPATIENT
Start: 2020-01-03 | End: 2020-01-07 | Stop reason: HOSPADM

## 2020-01-03 RX ORDER — FAMOTIDINE 20 MG/1
20 TABLET, FILM COATED ORAL DAILY
Status: DISCONTINUED | OUTPATIENT
Start: 2020-01-03 | End: 2020-01-07 | Stop reason: HOSPADM

## 2020-01-03 RX ADMIN — BUDESONIDE 250 MCG: 0.25 INHALANT RESPIRATORY (INHALATION) at 19:38

## 2020-01-03 RX ADMIN — PROPRANOLOL HYDROCHLORIDE 120 MG: 60 CAPSULE, EXTENDED RELEASE ORAL at 20:24

## 2020-01-03 RX ADMIN — ALBUTEROL SULFATE 2.5 MG: 2.5 SOLUTION RESPIRATORY (INHALATION) at 19:38

## 2020-01-03 RX ADMIN — PRAVASTATIN SODIUM 20 MG: 20 TABLET ORAL at 20:24

## 2020-01-03 RX ADMIN — HYDROCHLOROTHIAZIDE 12.5 MG: 12.5 CAPSULE ORAL at 20:23

## 2020-01-03 RX ADMIN — ASPIRIN 81 MG 81 MG: 81 TABLET ORAL at 20:23

## 2020-01-03 RX ADMIN — FLUOXETINE HYDROCHLORIDE 20 MG: 20 CAPSULE ORAL at 20:24

## 2020-01-03 RX ADMIN — CETIRIZINE HYDROCHLORIDE 5 MG: 5 TABLET ORAL at 20:23

## 2020-01-03 RX ADMIN — FAMOTIDINE 20 MG: 20 TABLET ORAL at 20:24

## 2020-01-03 SDOH — HEALTH STABILITY: MENTAL HEALTH: HOW OFTEN DO YOU HAVE A DRINK CONTAINING ALCOHOL?: MONTHLY OR LESS

## 2020-01-03 SDOH — HEALTH STABILITY: MENTAL HEALTH: HOW MANY STANDARD DRINKS CONTAINING ALCOHOL DO YOU HAVE ON A TYPICAL DAY?: 1 OR 2

## 2020-01-03 NOTE — LETTER
Whats most important for you to know is that your Medicare rights and benefits wont change because your health care provider is participating in [address] [place]. Medicare will keep covering all of your medically necessary services. Even though Medicare will pay your doctor in a different way under [employer], how much you have to pay wont change. Health care providers and suppliers who are enrolled in Medicare will submit their Medicare claims like they always have. Youll have all the same Medicare rights and protections, including the right to choose which hospital, doctor, or other health care provider you see. If you dont want to get care from a health care provider whos participating in [address] [place], then youll have to choose a different health care provider whos not participating in the Model. How can I give feedback about my health care? Medicare might ask you to take a voluntary survey about the services and care you received from [facility] during your hospital stay or outpatient procedure and for a specific period of time afterwards. You can decide whether you want to take the voluntary survey, but if you do, itll help Medicare make [employer] [facility] and the care of other Medicare patients better. If you have concerns or complaints about your care, you can:   · Talk to your doctor or health care provider. · Contact your Beneficiary and Family Centered Care Quality Improvement   Organization [employer] [facility]). You can get your [employer]s phone number  at Medicare.gov/contacts or by calling 1-800-MEDICARE. TTY users can  call 4-789.727.1606. Where can I learn more about [employer]? Learn more about [employer] at https://innovation.cms.gov/initiatives/bpci-advanced/:  · A list of all the hospitals and physician group practices in the country participating in [address] [place].   · All of the inpatient and outpatient Clinical Episodes that are currently included under [employer]. A Clinical Episode is a grouping of medical conditions or diagnoses that are included in the [address] [facility].

## 2020-01-03 NOTE — TELEPHONE ENCOUNTER
Called and spoke with patient, have cath scheduled for 01/06/20 7:30 am with arrival of 6:00 am. Advised patient NPO after midnight, may take morning medications with sip of water. Advised to check in at [facility] registration. Patient does have allergy to IV dye or Iodine. Will pre-treat with Prednisone 50 mg TID the day before. Patient instructed to have someone to drive them home as well.

## 2020-01-04 PROBLEM — I47.20 VENTRICULAR TACHYARRHYTHMIA (HCC): Status: ACTIVE | Noted: 2020-01-04

## 2020-01-04 LAB
GLUCOSE BLD-MCNC: 195 MG/DL (ref 70–99)
GLUCOSE BLD-MCNC: 204 MG/DL (ref 70–99)
GLUCOSE BLD-MCNC: 234 MG/DL (ref 70–99)
GLUCOSE BLD-MCNC: 259 MG/DL (ref 70–99)
PERFORMED ON: ABNORMAL
TROPONIN: <0.01 NG/ML (ref 0–0.03)

## 2020-01-04 PROCEDURE — 84484 ASSAY OF TROPONIN QUANT: CPT

## 2020-01-04 PROCEDURE — 6370000000 HC RX 637 (ALT 250 FOR IP): Performed by: PHYSICIAN ASSISTANT

## 2020-01-04 PROCEDURE — 6360000002 HC RX W HCPCS: Performed by: PHYSICIAN ASSISTANT

## 2020-01-04 PROCEDURE — 82948 REAGENT STRIP/BLOOD GLUCOSE: CPT

## 2020-01-04 PROCEDURE — 2140000000 HC CCU INTERMEDIATE R&B

## 2020-01-04 PROCEDURE — 94640 AIRWAY INHALATION TREATMENT: CPT

## 2020-01-04 PROCEDURE — 36415 COLL VENOUS BLD VENIPUNCTURE: CPT

## 2020-01-04 RX ORDER — NICOTINE POLACRILEX 4 MG
15 LOZENGE BUCCAL PRN
Status: DISCONTINUED | OUTPATIENT
Start: 2020-01-04 | End: 2020-01-07 | Stop reason: HOSPADM

## 2020-01-04 RX ORDER — ERGOCALCIFEROL 1.25 MG/1
50000 CAPSULE ORAL WEEKLY
Status: DISCONTINUED | OUTPATIENT
Start: 2020-01-04 | End: 2020-01-07 | Stop reason: HOSPADM

## 2020-01-04 RX ORDER — PREDNISONE 50 MG/1
TABLET ORAL
Qty: 3 TABLET | Refills: 0 | Status: SHIPPED | OUTPATIENT
Start: 2020-01-04 | End: 2020-01-07 | Stop reason: HOSPADM

## 2020-01-04 RX ORDER — AZELASTINE HCL 205.5 UG/1
1 SPRAY NASAL 2 TIMES DAILY
Status: DISCONTINUED | OUTPATIENT
Start: 2020-01-04 | End: 2020-01-07 | Stop reason: HOSPADM

## 2020-01-04 RX ORDER — DEXTROSE MONOHYDRATE 25 G/50ML
12.5 INJECTION, SOLUTION INTRAVENOUS PRN
Status: DISCONTINUED | OUTPATIENT
Start: 2020-01-04 | End: 2020-01-07 | Stop reason: HOSPADM

## 2020-01-04 RX ORDER — ALBUTEROL SULFATE 90 UG/1
2 AEROSOL, METERED RESPIRATORY (INHALATION) 2 TIMES DAILY
Status: DISCONTINUED | OUTPATIENT
Start: 2020-01-04 | End: 2020-01-04

## 2020-01-04 RX ORDER — ISOSORBIDE MONONITRATE 30 MG/1
30 TABLET, EXTENDED RELEASE ORAL DAILY
Qty: 30 TABLET | Refills: 5 | Status: SHIPPED | OUTPATIENT
Start: 2020-01-04

## 2020-01-04 RX ORDER — BUDESONIDE AND FORMOTEROL FUMARATE DIHYDRATE 160; 4.5 UG/1; UG/1
2 AEROSOL RESPIRATORY (INHALATION) 2 TIMES DAILY
Status: DISCONTINUED | OUTPATIENT
Start: 2020-01-04 | End: 2020-01-07 | Stop reason: HOSPADM

## 2020-01-04 RX ORDER — ALBUTEROL SULFATE 90 UG/1
2 AEROSOL, METERED RESPIRATORY (INHALATION) EVERY 6 HOURS PRN
Status: DISCONTINUED | OUTPATIENT
Start: 2020-01-04 | End: 2020-01-07 | Stop reason: HOSPADM

## 2020-01-04 RX ORDER — ALBUTEROL SULFATE 90 UG/1
2 AEROSOL, METERED RESPIRATORY (INHALATION) EVERY 6 HOURS PRN
Status: DISCONTINUED | OUTPATIENT
Start: 2020-01-04 | End: 2020-01-04

## 2020-01-04 RX ORDER — DEXTROSE MONOHYDRATE 50 MG/ML
100 INJECTION, SOLUTION INTRAVENOUS PRN
Status: DISCONTINUED | OUTPATIENT
Start: 2020-01-04 | End: 2020-01-07 | Stop reason: HOSPADM

## 2020-01-04 RX ADMIN — AZELASTINE HCL 1 SPRAY: 205.5 SPRAY NASAL at 21:57

## 2020-01-04 RX ADMIN — LINAGLIPTIN 5 MG: 5 TABLET, FILM COATED ORAL at 11:23

## 2020-01-04 RX ADMIN — FERROUS SULFATE TAB 325 MG (65 MG ELEMENTAL FE) 325 MG: 325 (65 FE) TAB at 21:55

## 2020-01-04 RX ADMIN — INSULIN LISPRO 1 UNITS: 100 INJECTION, SOLUTION INTRAVENOUS; SUBCUTANEOUS at 21:57

## 2020-01-04 RX ADMIN — PROPRANOLOL HYDROCHLORIDE 120 MG: 60 CAPSULE, EXTENDED RELEASE ORAL at 21:55

## 2020-01-04 RX ADMIN — FLUTICASONE PROPIONATE 1 SPRAY: 50 SPRAY, METERED NASAL at 08:05

## 2020-01-04 RX ADMIN — FLUOXETINE HYDROCHLORIDE 20 MG: 20 CAPSULE ORAL at 21:56

## 2020-01-04 RX ADMIN — PRAVASTATIN SODIUM 20 MG: 20 TABLET ORAL at 21:55

## 2020-01-04 RX ADMIN — CETIRIZINE HYDROCHLORIDE 5 MG: 5 TABLET ORAL at 21:55

## 2020-01-04 RX ADMIN — ASPIRIN 81 MG 81 MG: 81 TABLET ORAL at 21:55

## 2020-01-04 RX ADMIN — ALBUTEROL SULFATE 2.5 MG: 2.5 SOLUTION RESPIRATORY (INHALATION) at 06:49

## 2020-01-04 RX ADMIN — BUDESONIDE 250 MCG: 0.25 INHALANT RESPIRATORY (INHALATION) at 06:49

## 2020-01-04 RX ADMIN — FAMOTIDINE 20 MG: 20 TABLET ORAL at 21:56

## 2020-01-04 RX ADMIN — HYDROCHLOROTHIAZIDE 12.5 MG: 12.5 CAPSULE ORAL at 21:56

## 2020-01-04 RX ADMIN — INSULIN LISPRO 3 UNITS: 100 INJECTION, SOLUTION INTRAVENOUS; SUBCUTANEOUS at 11:23

## 2020-01-04 RX ADMIN — INSULIN LISPRO 2 UNITS: 100 INJECTION, SOLUTION INTRAVENOUS; SUBCUTANEOUS at 16:59

## 2020-01-04 RX ADMIN — PANTOPRAZOLE SODIUM 20 MG: 20 TABLET, DELAYED RELEASE ORAL at 06:04

## 2020-01-04 ASSESSMENT — ENCOUNTER SYMPTOMS
ALLERGIC/IMMUNOLOGIC NEGATIVE: 1
RESPIRATORY NEGATIVE: 1
EYES NEGATIVE: 1
GASTROINTESTINAL NEGATIVE: 1

## 2020-01-04 NOTE — H&P
[facility]  History and Physical    Patient:  Frank Neff  MRN: [id number]    CHIEF COMPLAINT:  V Tach    History Obtained From:  patient  PCP: Kristopher Oglesby MD    HISTORY OF PRESENT ILLNESS:   The patient is a 77 y.o. male who presents as direct admit from the office armando Johnson during stress test on 1-2-19--> Needs Heart Cath! REVIEW OF SYSTEMS:  Review of Systems   HENT: Negative. Eyes: Negative. Respiratory: Negative. Cardiovascular: Negative. Gastrointestinal: Negative. Endocrine: Negative. Genitourinary: Negative. Musculoskeletal: Negative. Allergic/Immunologic: Negative. Neurological: Negative. Psychiatric/Behavioral: Negative. Past Medical History:      Diagnosis Date    Allergic rhinitis     Clark esophagus     Chronic fatigue syndrome     COPD (chronic obstructive pulmonary disease) (HCC)     Coronary atherosclerosis due to calcified coronary lesion of native artery     Gastritis     GERD (gastroesophageal reflux disease)     Glucose intolerance (impaired glucose tolerance)     History of knee sprain     Hyperlipidemia     Hypertension     Kidney stones     Non-insulin dependent type 2 diabetes mellitus ([address]     Obesity     ELROY on CPAP     Tremor        Past Surgical History:      Procedure Laterality Date    BARIATRIC SURGERY  02/09/2005    CARDIAC CATHETERIZATION  1998    no blockages    COLONOSCOPY  05/22/2006    ELBOW SURGERY Right 08/21/2013    TOTAL KNEE ARTHROPLASTY Right 03/18/2013    UPPER GASTROINTESTINAL ENDOSCOPY  2002    UPPER GASTROINTESTINAL ENDOSCOPY  04/19/2012       Medications Prior to Admission:    Prior to Admission medications    Medication Sig Start Date End Date Taking?  Authorizing Provider   pravastatin (PRAVACHOL) 20 MG tablet Take 1 tablet by mouth daily 8/21/19   Severo Abernethy, APRN   sitaGLIPtan-metformin (JANUMET)  MG per tablet Take 1 tablet by mouth daily    Historical Provider, MD Socioeconomic History    Marital status:      Spouse name: Not on file    Number of children: Not on file    Years of education: Not on file    Highest education level: Not on file   Occupational History    Not on file   Social Needs    Financial resource strain: Not on file    Food insecurity:     Worry: Not on file     Inability: Not on file    Transportation needs:     Medical: Not on file     Non-medical: Not on file   Tobacco Use    Smoking status: Never Smoker    Smokeless tobacco: Never Used   Substance and Sexual Activity    Alcohol use:  Yes     Alcohol/week: 6.0 standard drinks     Types: 6 Shots of liquor per week     Frequency: Monthly or less     Drinks per session: 1 or 2     Binge frequency: Never    Drug use: No    Sexual activity: Yes   Lifestyle    Physical activity:     Days per week: Not on file     Minutes per session: Not on file    Stress: Not on file   Relationships    Social connections:     Talks on phone: Not on file     Gets together: Not on file     Attends Cheondoism service: Not on file     Active member of club or organization: Not on file     Attends meetings of clubs or organizations: Not on file     Relationship status: Not on file    Intimate partner violence:     Fear of current or ex partner: Not on file     Emotionally abused: Not on file     Physically abused: Not on file     Forced sexual activity: Not on file   Other Topics Concern    Not on file   Social History Narrative    Not on file       Family History:       Problem Relation Age of Onset    Stroke Mother     Cancer Mother     Heart Attack Father     Heart Disease Father     Arthritis Sister     Cancer Brother         BLADDER    No Known Problems Maternal Grandmother     No Known Problems Maternal Grandfather     Heart Disease Paternal Grandfather     No Known Problems Sister            Physical Exam:    Vitals: /73   Pulse 50   Temp 97 °F (36.1 °C) (Temporal)   Resp 18 Ht 6' 2.5\" (1.892 m)   Wt 299 lb 12.8 oz (136 kg)   SpO2 95%   BMI 37.98 kg/m²     Objective:  General Appearance:  Comfortable. Vital signs: (most recent): Blood pressure 119/73, pulse 50, temperature 97 °F (36.1 °C), temperature source Temporal, resp. rate 18, height 6' 2.5\" (1.892 m), weight 299 lb 12.8 oz (136 kg), SpO2 95 %. Vital signs are normal.    Output: Producing urine and producing stool. HEENT: Normal HEENT exam.    Heart: Normal rate. Abdomen: Abdomen is soft. Bowel sounds are normal.     Extremities: Normal range of motion. Neurological: Patient is alert and oriented to person, place and time. Pupils:  Pupils are equal, round, and reactive to light. CBC:   Recent Labs     01/03/20  1659   WBC 10.7   HGB 13.1*        BMP:    Recent Labs     01/03/20  1659   *   K 4.3   CL 96*   CO2 26   BUN 13   CREATININE 0.9   GLUCOSE 146*   1/3/2020  5:28 PM - Salo, Lisa Incoming Lab Results From [facility]     Component Value Ref Range & Units Status Collected Lab   Magnesium 2.1  1.6 - 2.4 mg/dL          Hepatic:   Recent Labs     01/03/20  1659   AST 14   ALT 16   BILITOT 0.4   ALKPHOS 60     Troponin T:   Recent Labs     01/03/20  1659 01/04/20  0002   TROPONINI <0.01 <0.01     Pro-BNP: No results for input(s): BNP in the last 72 hours. Lipids: No results for input(s): CHOL, HDL in the last 72 hours. Invalid input(s): LDLCALCU  ABGs: No results found for: PHART, PO2ART, JBF7DGG  INR: No results for input(s): INR in the last 72 hours. -----------------------------------------------------------------  EKG: sinus  Radiology:     No results found. Assessment and Plan   1. Active Problems:    VDretach [facility])  Resolved Problems:    * No resolved hospital problems. *      PLAN:  Cardiology eval for heart cath , had + DSE W V Tach on 1-2-19    Elliot Ba PA-C  1/4/2020    High risk patient with multiple risk factors sent for stress echo and developed V-tach.   Direct admit for obs and cardiology consult for heart cath.      Electronically signed by Gonzalez Zaman MD on 1/4/2020 at 1:01 PM

## 2020-01-05 LAB
GLUCOSE BLD-MCNC: 149 MG/DL (ref 70–99)
GLUCOSE BLD-MCNC: 170 MG/DL (ref 70–99)
GLUCOSE BLD-MCNC: 207 MG/DL (ref 70–99)
GLUCOSE BLD-MCNC: 210 MG/DL (ref 70–99)
PERFORMED ON: ABNORMAL
TROPONIN: <0.01 NG/ML (ref 0–0.03)

## 2020-01-05 PROCEDURE — 82948 REAGENT STRIP/BLOOD GLUCOSE: CPT

## 2020-01-05 PROCEDURE — 84484 ASSAY OF TROPONIN QUANT: CPT

## 2020-01-05 PROCEDURE — 2140000000 HC CCU INTERMEDIATE R&B

## 2020-01-05 PROCEDURE — 6370000000 HC RX 637 (ALT 250 FOR IP): Performed by: PHYSICIAN ASSISTANT

## 2020-01-05 PROCEDURE — 36415 COLL VENOUS BLD VENIPUNCTURE: CPT

## 2020-01-05 PROCEDURE — 6360000002 HC RX W HCPCS: Performed by: PHYSICIAN ASSISTANT

## 2020-01-05 RX ADMIN — BUDESONIDE AND FORMOTEROL FUMARATE DIHYDRATE 2 PUFF: 160; 4.5 AEROSOL RESPIRATORY (INHALATION) at 08:40

## 2020-01-05 RX ADMIN — AZELASTINE HCL 1 SPRAY: 205.5 SPRAY NASAL at 20:53

## 2020-01-05 RX ADMIN — INSULIN LISPRO 1 UNITS: 100 INJECTION, SOLUTION INTRAVENOUS; SUBCUTANEOUS at 17:16

## 2020-01-05 RX ADMIN — HYDROCHLOROTHIAZIDE 12.5 MG: 12.5 CAPSULE ORAL at 20:47

## 2020-01-05 RX ADMIN — FERROUS SULFATE TAB 325 MG (65 MG ELEMENTAL FE) 325 MG: 325 (65 FE) TAB at 20:47

## 2020-01-05 RX ADMIN — FLUTICASONE PROPIONATE 1 SPRAY: 50 SPRAY, METERED NASAL at 08:41

## 2020-01-05 RX ADMIN — PANTOPRAZOLE SODIUM 20 MG: 20 TABLET, DELAYED RELEASE ORAL at 06:03

## 2020-01-05 RX ADMIN — PRAVASTATIN SODIUM 20 MG: 20 TABLET ORAL at 20:47

## 2020-01-05 RX ADMIN — ASPIRIN 81 MG 81 MG: 81 TABLET ORAL at 20:57

## 2020-01-05 RX ADMIN — INSULIN LISPRO 1 UNITS: 100 INJECTION, SOLUTION INTRAVENOUS; SUBCUTANEOUS at 08:43

## 2020-01-05 RX ADMIN — CETIRIZINE HYDROCHLORIDE 5 MG: 5 TABLET ORAL at 20:47

## 2020-01-05 RX ADMIN — INSULIN LISPRO 2 UNITS: 100 INJECTION, SOLUTION INTRAVENOUS; SUBCUTANEOUS at 12:59

## 2020-01-05 RX ADMIN — BUDESONIDE AND FORMOTEROL FUMARATE DIHYDRATE 2 PUFF: 160; 4.5 AEROSOL RESPIRATORY (INHALATION) at 20:53

## 2020-01-05 RX ADMIN — FAMOTIDINE 20 MG: 20 TABLET ORAL at 20:48

## 2020-01-05 RX ADMIN — AZELASTINE HCL 1 SPRAY: 205.5 SPRAY NASAL at 08:41

## 2020-01-05 RX ADMIN — FLUOXETINE HYDROCHLORIDE 20 MG: 20 CAPSULE ORAL at 20:47

## 2020-01-05 RX ADMIN — ENOXAPARIN SODIUM 40 MG: 40 INJECTION SUBCUTANEOUS at 08:39

## 2020-01-05 RX ADMIN — LINAGLIPTIN 5 MG: 5 TABLET, FILM COATED ORAL at 08:40

## 2020-01-05 RX ADMIN — PROPRANOLOL HYDROCHLORIDE 120 MG: 60 CAPSULE, EXTENDED RELEASE ORAL at 20:47

## 2020-01-06 ENCOUNTER — HOSPITAL ENCOUNTER (OUTPATIENT)
Dept: CARDIAC CATH/INVASIVE PROCEDURES | Age: 70
Discharge: HOME | End: 2020-01-06
Payer: MEDICARE

## 2020-01-06 LAB
GLUCOSE BLD-MCNC: 170 MG/DL (ref 70–99)
GLUCOSE BLD-MCNC: 199 MG/DL (ref 70–99)
GLUCOSE BLD-MCNC: 232 MG/DL (ref 70–99)
GLUCOSE BLD-MCNC: 377 MG/DL (ref 70–99)
PERFORMED ON: ABNORMAL
TROPONIN: <0.01 NG/ML (ref 0–0.03)

## 2020-01-06 PROCEDURE — 6360000004 HC RX CONTRAST MEDICATION: Performed by: FAMILY MEDICINE

## 2020-01-06 PROCEDURE — C1769 GUIDE WIRE: HCPCS

## 2020-01-06 PROCEDURE — 93571 IV DOP VEL&/PRESS C FLO 1ST: CPT

## 2020-01-06 PROCEDURE — 2709999900 HC NON-CHARGEABLE SUPPLY

## 2020-01-06 PROCEDURE — 84484 ASSAY OF TROPONIN QUANT: CPT

## 2020-01-06 PROCEDURE — 93571 IV DOP VEL&/PRESS C FLO 1ST: CPT | Performed by: INTERNAL MEDICINE

## 2020-01-06 PROCEDURE — 99152 MOD SED SAME PHYS/QHP 5/>YRS: CPT

## 2020-01-06 PROCEDURE — 99152 MOD SED SAME PHYS/QHP 5/>YRS: CPT | Performed by: INTERNAL MEDICINE

## 2020-01-06 PROCEDURE — 2580000003 HC RX 258: Performed by: INTERNAL MEDICINE

## 2020-01-06 PROCEDURE — 6360000002 HC RX W HCPCS

## 2020-01-06 PROCEDURE — 2140000000 HC CCU INTERMEDIATE R&B

## 2020-01-06 PROCEDURE — 4A023N7 MEASUREMENT OF CARDIAC SAMPLING AND PRESSURE, LEFT HEART, PERCUTANEOUS APPROACH: ICD-10-PCS | Performed by: INTERNAL MEDICINE

## 2020-01-06 PROCEDURE — 6370000000 HC RX 637 (ALT 250 FOR IP): Performed by: INTERNAL MEDICINE

## 2020-01-06 PROCEDURE — 99153 MOD SED SAME PHYS/QHP EA: CPT

## 2020-01-06 PROCEDURE — B2151ZZ FLUOROSCOPY OF LEFT HEART USING LOW OSMOLAR CONTRAST: ICD-10-PCS | Performed by: INTERNAL MEDICINE

## 2020-01-06 PROCEDURE — 82948 REAGENT STRIP/BLOOD GLUCOSE: CPT

## 2020-01-06 PROCEDURE — 6370000000 HC RX 637 (ALT 250 FOR IP): Performed by: PHYSICIAN ASSISTANT

## 2020-01-06 PROCEDURE — B2111ZZ FLUOROSCOPY OF MULTIPLE CORONARY ARTERIES USING LOW OSMOLAR CONTRAST: ICD-10-PCS | Performed by: INTERNAL MEDICINE

## 2020-01-06 PROCEDURE — C1894 INTRO/SHEATH, NON-LASER: HCPCS

## 2020-01-06 PROCEDURE — 2500000003 HC RX 250 WO HCPCS

## 2020-01-06 PROCEDURE — 93458 L HRT ARTERY/VENTRICLE ANGIO: CPT | Performed by: INTERNAL MEDICINE

## 2020-01-06 PROCEDURE — 93458 L HRT ARTERY/VENTRICLE ANGIO: CPT

## 2020-01-06 PROCEDURE — 36415 COLL VENOUS BLD VENIPUNCTURE: CPT

## 2020-01-06 PROCEDURE — C1887 CATHETER, GUIDING: HCPCS

## 2020-01-06 RX ORDER — ACETAMINOPHEN 325 MG/1
650 TABLET ORAL EVERY 4 HOURS PRN
Status: DISCONTINUED | OUTPATIENT
Start: 2020-01-06 | End: 2020-01-07 | Stop reason: HOSPADM

## 2020-01-06 RX ORDER — ONDANSETRON 2 MG/ML
4 INJECTION INTRAMUSCULAR; INTRAVENOUS EVERY 6 HOURS PRN
Status: DISCONTINUED | OUTPATIENT
Start: 2020-01-06 | End: 2020-01-07 | Stop reason: HOSPADM

## 2020-01-06 RX ORDER — SODIUM CHLORIDE 0.9 % (FLUSH) 0.9 %
10 SYRINGE (ML) INJECTION PRN
Status: DISCONTINUED | OUTPATIENT
Start: 2020-01-06 | End: 2020-01-07 | Stop reason: HOSPADM

## 2020-01-06 RX ORDER — SODIUM CHLORIDE 9 MG/ML
INJECTION, SOLUTION INTRAVENOUS CONTINUOUS
Status: DISCONTINUED | OUTPATIENT
Start: 2020-01-06 | End: 2020-01-07 | Stop reason: HOSPADM

## 2020-01-06 RX ORDER — ONDANSETRON 2 MG/ML
4 INJECTION INTRAMUSCULAR; INTRAVENOUS EVERY 6 HOURS PRN
Status: DISCONTINUED | OUTPATIENT
Start: 2020-01-06 | End: 2020-01-06 | Stop reason: SDUPTHER

## 2020-01-06 RX ORDER — IODIXANOL 320 MG/ML
150 INJECTION, SOLUTION INTRAVASCULAR
Status: COMPLETED | OUTPATIENT
Start: 2020-01-06 | End: 2020-01-06

## 2020-01-06 RX ORDER — ALPRAZOLAM 0.5 MG/1
0.5 TABLET ORAL
Status: ACTIVE | OUTPATIENT
Start: 2020-01-06 | End: 2020-01-06

## 2020-01-06 RX ORDER — PREDNISONE 50 MG/1
50 TABLET ORAL EVERY 6 HOURS
Status: COMPLETED | OUTPATIENT
Start: 2020-01-06 | End: 2020-01-06

## 2020-01-06 RX ORDER — SODIUM CHLORIDE 0.9 % (FLUSH) 0.9 %
10 SYRINGE (ML) INJECTION EVERY 12 HOURS SCHEDULED
Status: DISCONTINUED | OUTPATIENT
Start: 2020-01-06 | End: 2020-01-07 | Stop reason: HOSPADM

## 2020-01-06 RX ORDER — PREDNISONE 50 MG/1
50 TABLET ORAL ONCE
Status: COMPLETED | OUTPATIENT
Start: 2020-01-06 | End: 2020-01-06

## 2020-01-06 RX ADMIN — SODIUM CHLORIDE: 9 INJECTION, SOLUTION INTRAVENOUS at 20:45

## 2020-01-06 RX ADMIN — CETIRIZINE HYDROCHLORIDE 5 MG: 5 TABLET ORAL at 22:46

## 2020-01-06 RX ADMIN — PROPRANOLOL HYDROCHLORIDE 120 MG: 60 CAPSULE, EXTENDED RELEASE ORAL at 22:36

## 2020-01-06 RX ADMIN — PREDNISONE 50 MG: 50 TABLET ORAL at 08:15

## 2020-01-06 RX ADMIN — INSULIN LISPRO 3 UNITS: 100 INJECTION, SOLUTION INTRAVENOUS; SUBCUTANEOUS at 22:46

## 2020-01-06 RX ADMIN — HYDROCHLOROTHIAZIDE 12.5 MG: 12.5 CAPSULE ORAL at 22:46

## 2020-01-06 RX ADMIN — PREDNISONE 50 MG: 50 TABLET ORAL at 08:14

## 2020-01-06 RX ADMIN — IODIXANOL 130 ML: 320 INJECTION, SOLUTION INTRAVASCULAR at 19:51

## 2020-01-06 RX ADMIN — AZELASTINE HCL 1 SPRAY: 205.5 SPRAY NASAL at 22:38

## 2020-01-06 RX ADMIN — FLUOXETINE HYDROCHLORIDE 20 MG: 20 CAPSULE ORAL at 22:36

## 2020-01-06 RX ADMIN — FAMOTIDINE 20 MG: 20 TABLET ORAL at 22:36

## 2020-01-06 RX ADMIN — BUDESONIDE AND FORMOTEROL FUMARATE DIHYDRATE 2 PUFF: 160; 4.5 AEROSOL RESPIRATORY (INHALATION) at 22:37

## 2020-01-06 RX ADMIN — FERROUS SULFATE TAB 325 MG (65 MG ELEMENTAL FE) 325 MG: 325 (65 FE) TAB at 22:36

## 2020-01-06 RX ADMIN — ASPIRIN 81 MG 81 MG: 81 TABLET ORAL at 22:36

## 2020-01-06 RX ADMIN — PREDNISONE 50 MG: 50 TABLET ORAL at 13:53

## 2020-01-06 RX ADMIN — PRAVASTATIN SODIUM 20 MG: 20 TABLET ORAL at 22:36

## 2020-01-06 RX ADMIN — PREDNISONE 50 MG: 50 TABLET ORAL at 22:36

## 2020-01-06 NOTE — PROGRESS NOTES
Cardiology Progress Note Gab Ricci MD      Patient:  Gertrude uRdolph  [id number]    Patient Active Problem List    Diagnosis Date Noted    Syncope      Priority: High    Ventricular tachyarrhythmia ([facility] 01/04/2020     Priority: Low    V-tach ([facility] 01/03/2020     Priority: Low    Right ventricular enlargement 01/28/2019     Priority: Low    Biatrial enlargement 01/28/2019     Priority: Low    Type 2 diabetes mellitus with complication, without long-term current use of insulin ([facility] [address] [address] 01/28/2019     Priority: Low    Sleep apnea, obstructive 09/21/2017     Priority: Low    Coronary atherosclerosis due to calcified coronary lesion of native artery      Priority: Low    Hypertension      Priority: Low    Class 2 obesity due to excess calories without serious comorbidity with body mass index (BMI) of 38.0 to 38.9 in adult      Priority: Low    ELROY on CPAP      Priority: Low       Admit Date:  (Not on file)    Admission Problem List: Present on Admission:  **None**      Cardiac Specific Data:  Specialty Problems        Cardiology Problems    Syncope        Coronary atherosclerosis due to calcified coronary lesion of native artery        Hypertension        Biatrial enlargement        Right ventricular enlargement        V-tach [facility])        Ventricular tachyarrhythmia ([facility]              Subjective:  Mr. Vamshi Winter who was admitted over the weekend after positive stress test 1/2/2020 with ventricular tachycardia and DST. He is scheduled for cardiac catheterization. Objective: There were no vitals taken for this visit. Intake/Output Summary (Last 24 hours) at 1/6/2020 9555  Last data filed at 1/6/2020 0513  Gross per 24 hour   Intake 1810 ml   Output 2650 ml   Net -840 ml       Prior to Admission medications    Medication Sig Start Date End Date Taking?  Authorizing Provider   isosorbide mononitrate (IMDUR) 30 MG extended release tablet Take 1 tablet by mouth daily 1/4/20   Gab Ricci MD   predniSONE ([place]) 50 MG tablet Take 1 tablet in the morning, 1 tablet in the afternoon, and 1 tablet in the evening the day before your procedure 1/4/20   Roxana Lozada MD   pravastatin (PRAVACHOL) 20 MG tablet Take 1 tablet by mouth daily 8/21/19   CHRISTOPHER Ott   sitaGLIPtan-metformin (JANUMET)  MG per tablet Take 1 tablet by mouth daily    Historical Provider, MD   budesonide-formoterol (SYMBICORT) 160-4.5 MCG/ACT AERO Inhale 2 puffs into the lungs 2 times daily    Historical Provider, MD   aclidinium (TUDORZA PRESSAIR) 400 MCG/ACT AEPB inhaler Inhale 1 puff into the lungs 2 times daily    Historical Provider, MD   aspirin 81 MG tablet Take 81 mg by mouth daily    Historical Provider, MD   propranolol (INDERAL LA) 120 MG extended release capsule Take 120 mg by mouth daily    Historical Provider, MD   albuterol sulfate HFA (PROAIR HFA) 108 (90 BASE) MCG/ACT inhaler Inhale 2 puffs into the lungs every 6 hours as needed for Wheezing    Historical Provider, MD   EPINEPHrine (EPIPEN 2-KEENA) 0.3 MG/0.3ML SOAJ injection Inject 0.3 mg into the muscle as needed Use as directed for allergic reaction    Historical Provider, MD   azelastine HCl 0.15 % SOLN by Nasal route    Historical Provider, MD   fluticasone (FLONASE) 50 MCG/ACT nasal spray 1 spray by Nasal route daily    Historical Provider, MD   lansoprazole (PREVACID) 30 MG delayed release capsule Take 30 mg by mouth daily    Historical Provider, MD   vitamin D (ERGOCALCIFEROL) 25818 UNITS CAPS capsule Take 50,000 Units by mouth once a week  12/1/16   Historical Provider, MD   ferrous sulfate 325 (65 FE) MG tablet Take 325 mg by mouth daily (with breakfast)  10/4/16   Historical Provider, MD   FLUoxetine (PROZAC) 20 MG capsule Take 20 mg by mouth daily  12/1/16   Historical Provider, MD   hydrochlorothiazide (HYDRODIURIL) 12.5 MG tablet Take 12.5 mg by mouth daily  9/28/16   Historical Provider, MD   levocetirizine (XYZAL) 5 MG tablet Take 5 mg by mouth nightly  11/14/16 Historical Provider, MD   ranitidine (ZANTAC) 150 MG tablet Take 150 mg by mouth nightly  12/1/16   Historical Provider, MD   timolol (TIMOPTIC-XE) 0.5 % ophthalmic gel-forming Place 1 drop into both eyes nightly  9/1/16   Historical Provider, MD           TELEMETRY: Sinus     Physical Exam:      Physical Exam  Constitutional: He is oriented to person, place, and time. He appears well-developed. Severe truncal and generalized obesity. HENT:   Mouth/Throat: No oropharyngeal exudate. Eyes: Right eye exhibits no discharge. Left eye exhibits no discharge. No scleral icterus. Neck: No JVD present. No thyromegaly present. Cardiovascular: Normal rate and regular rhythm. Exam reveals no gallop and no friction rub. No murmur heard. Pulmonary/Chest: No stridor. No respiratory distress. He has no wheezes. He has no rales. Abdominal: Soft. Bowel sounds are normal. He exhibits no distension and no mass. There is no hepatosplenomegaly. There is no tenderness. There is no rebound and no guarding. Musculoskeletal: He exhibits no edema or deformity. Neurological: He is alert and oriented to person, place, and time. He displays normal reflexes. He exhibits normal muscle tone. Coordination normal.   Skin: Skin is warm. No rash noted. No erythema. No pallor. Psychiatric: He has a normal mood and affect. Lab Data:  CBC:   Recent Labs     01/03/20  1659   WBC 10.7   HGB 13.1*   HCT 40.2*   MCV 88.7        BMP:   Recent Labs     01/03/20  1659   *   K 4.3   CL 96*   CO2 26   BUN 13   CREATININE 0.9     LIVER PROFILE:   Recent Labs     01/03/20  1659   AST 14   ALT 16   BILITOT 0.4   ALKPHOS 60     PT/INR: No results for input(s): PROTIME, INR in the last 72 hours. APTT: No results for input(s): APTT in the last 72 hours. BNP:  No results for input(s): BNP in the last 72 hours. CK, CKMB, Troponin: @LABRCNT (CKTOTAL:3, CKMB:3, TROPONINI:3)@    IMAGING:  No results found.   ECHOCARDIOGRAM STRESS TEST:ECHOCARDIOGRAM PHARMACOLOGICAL STRESS TEST (aka   DO. Study Location: Echo Lab  Technical Quality: Adequate visualization     Patient Status: Outpatient     Contrast Medium: Definity. Amount - 1.5 ml     Rhythm: Within normal limits HR: 60 bpm BP: 121/79 mmHg     Indications:Syncope and Coronary artery disease.      Conclusions      Summary   Dobutamine stress echocardiogram positive due to runs of ventricular   tachycardia. There was no significant ST segment changes noted. There was   no clinical or echocardiographic evidence of myocardial ischemia. Test was   supervised by Dr. Poonam Quinonez . Recommendation   Clinical correlation is advised. Assessment and Plan:    43-year-old gentleman with past medical history of coronary atherosclerosis based on CT scan with coronary calcification, asymptomatic, diabetes mellitus, hypertension with mild LVH, normal LV ejection fraction, obesity with prior history of bariatric surgery with positive dobutamine stress echo with high risk features of ventricular tachycardia referred for cardiac catheterization. Risks, benefits, alternatives of cardiac catheterization/PCI discussed with the patient and full informed consent obtained.   Acceptable Mallampati score  Consent for moderate conscious sedation  ASA 3    Marinda Meigs, MD 1/6/2020 6:52 AM

## 2020-01-07 VITALS
SYSTOLIC BLOOD PRESSURE: 117 MMHG | TEMPERATURE: 97 F | BODY MASS INDEX: 36.13 KG/M2 | DIASTOLIC BLOOD PRESSURE: 66 MMHG | HEART RATE: 52 BPM | OXYGEN SATURATION: 98 % | HEIGHT: 75 IN | WEIGHT: 290.6 LBS | RESPIRATION RATE: 18 BRPM

## 2020-01-07 LAB
GLUCOSE BLD-MCNC: 283 MG/DL (ref 70–99)
PERFORMED ON: ABNORMAL

## 2020-01-07 PROCEDURE — 6370000000 HC RX 637 (ALT 250 FOR IP): Performed by: PHYSICIAN ASSISTANT

## 2020-01-07 PROCEDURE — 6360000002 HC RX W HCPCS: Performed by: PHYSICIAN ASSISTANT

## 2020-01-07 PROCEDURE — 82948 REAGENT STRIP/BLOOD GLUCOSE: CPT

## 2020-01-07 RX ADMIN — LINAGLIPTIN 5 MG: 5 TABLET, FILM COATED ORAL at 08:12

## 2020-01-07 RX ADMIN — BUDESONIDE AND FORMOTEROL FUMARATE DIHYDRATE 2 PUFF: 160; 4.5 AEROSOL RESPIRATORY (INHALATION) at 08:13

## 2020-01-07 RX ADMIN — INSULIN LISPRO 3 UNITS: 100 INJECTION, SOLUTION INTRAVENOUS; SUBCUTANEOUS at 09:04

## 2020-01-07 RX ADMIN — AZELASTINE HCL 1 SPRAY: 205.5 SPRAY NASAL at 08:13

## 2020-01-07 RX ADMIN — PANTOPRAZOLE SODIUM 20 MG: 20 TABLET, DELAYED RELEASE ORAL at 06:16

## 2020-01-07 RX ADMIN — FLUTICASONE PROPIONATE 1 SPRAY: 50 SPRAY, METERED NASAL at 08:13

## 2020-01-07 RX ADMIN — ENOXAPARIN SODIUM 40 MG: 40 INJECTION SUBCUTANEOUS at 08:12

## 2020-01-07 NOTE — DISCHARGE SUMMARY
Hospital Discharge Summary    Miguel Flores  :  1950  MRN:  [id number]    Admit date:  1/3/2020  Discharge date:   2020      Admitting Physician:    Collin Abreu MD    Discharge Diagnoses: Active Problems:    V-tach [facility])    Ventricular tachyarrhythmia (HCC)  Resolved Problems:    * No resolved hospital problems. *  LAD CAD    Hospital Course: The patient was sent for stress echo and experienced V tach during the stress test.  Subsequently admitted and monitored. Ruled out for AMI. Taken for heart cath and noted mid LAD disease not requiring stenting. Recommendation from Cards was medical mgt. On beta blocker, statin and aspirin already. Added imdur. Please see daily progress note for further details concerning their stay. The patient improved throughout their stay and reached maximum medical improvement on the day of discharge. The patient/family agree with the treatment plan as outlined above. All questions concerning their stay were answered prior to discharge. They understand the importance of follow up concerning any abnormal test results.      Discharge Medications:       Delma Bigger   Home Medication Instructions EYY:[id number]    Printed on:20 3554   Medication Information                      aclidinium (TUDORZA PRESSAIR) 400 MCG/ACT AEPB inhaler  Inhale 1 puff into the lungs 2 times daily             albuterol sulfate HFA (PROAIR HFA) 108 (90 BASE) MCG/ACT inhaler  Inhale 2 puffs into the lungs every 6 hours as needed for Wheezing             aspirin 81 MG tablet  Take 81 mg by mouth daily             azelastine HCl 0.15 % SOLN  by Nasal route             budesonide-formoterol (SYMBICORT) 160-4.5 MCG/ACT AERO  Inhale 2 puffs into the lungs 2 times daily             ferrous sulfate 325 (65 FE) MG tablet  Take 325 mg by mouth daily (with breakfast)              FLUoxetine (PROZAC) 20 MG capsule  Take 20 mg by mouth daily              fluticasone (FLONASE) 50 MCG/ACT nasal spray  1 spray by Nasal route daily             hydrochlorothiazide (HYDRODIURIL) 12.5 MG tablet  Take 12.5 mg by mouth daily              isosorbide mononitrate (IMDUR) 30 MG extended release tablet  Take 1 tablet by mouth daily             lansoprazole (PREVACID) 30 MG delayed release capsule  Take 30 mg by mouth daily             levocetirizine (XYZAL) 5 MG tablet  Take 5 mg by mouth nightly              pravastatin (PRAVACHOL) 20 MG tablet  Take 1 tablet by mouth daily             propranolol (INDERAL LA) 120 MG extended release capsule  Take 120 mg by mouth daily             ranitidine (ZANTAC) 150 MG tablet  Take 150 mg by mouth nightly              sitaGLIPtan-metformin (JANUMET)  MG per tablet  Take 1 tablet by mouth daily             timolol (TIMOPTIC-XE) 0.5 % ophthalmic gel-forming  Place 1 drop into both eyes nightly              vitamin D (ERGOCALCIFEROL) 82755 UNITS CAPS capsule  Take 50,000 Units by mouth once a week                  Consults:   Dr Gregg Phillips - cards  Significant Diagnostic Studies:    See summary of labs/x-rays/path report for further details      Treatments:   As above    Disposition:   home  Follow up with Porsha Rivera MD in 1 weeks.     Signed:  Porsha Rivera MD,MPH  1/7/2020, 8:36 AM

## 2020-01-07 NOTE — PLAN OF CARE
Problem: SAFETY  Goal: Free from accidental physical injury  1/5/2020 1029 by Pieter Smith RN  Outcome: Ongoing  1/5/2020 0253 by Mariluz Malagon RN  Outcome: Ongoing  Goal: Free from intentional harm  1/5/2020 1029 by Pieter Smith RN  Outcome: Ongoing  1/5/2020 0253 by Mariluz Malagon RN  Outcome: Ongoing     Problem: DAILY CARE  Goal: Daily care needs are met  1/5/2020 1029 by Pieter Smith RN  Outcome: Ongoing  1/5/2020 0253 by Mariluz Malagon RN  Outcome: Ongoing     Problem: PAIN  Goal: Patient's pain/discomfort is manageable  1/5/2020 1029 by Pieter Smith RN  Outcome: Ongoing  1/5/2020 0253 by Mariluz Malagon RN  Outcome: Ongoing     Problem: KNOWLEDGE DEFICIT  Goal: Patient/S.O. demonstrates understanding of disease process, treatment plan, medications, and discharge instructions.   1/5/2020 1029 by Pieter Smith RN  Outcome: Ongoing  1/5/2020 0253 by Mariluz Malagon RN  Outcome: Ongoing     Problem: Falls - Risk of:  Goal: Will remain free from falls  Description  Will remain free from falls  1/5/2020 1029 by Pieter Smith RN  Outcome: Ongoing  1/5/2020 0253 by Mariluz Malagon RN  Outcome: Ongoing  Goal: Absence of physical injury  Description  Absence of physical injury  1/5/2020 1029 by Pieter Smith RN  Outcome: Ongoing  1/5/2020 0253 by Mariluz Malagon RN  Outcome: Ongoing     Problem: Cardiac:  Goal: Ability to maintain vital signs within normal range will improve  Description  Ability to maintain vital signs within normal range will improve  Outcome: Ongoing  Goal: Cardiovascular alteration will improve  Description  Cardiovascular alteration will improve  Outcome: Ongoing
Problem: SAFETY  Goal: Free from accidental physical injury  1/6/2020 1054 by Jose Luis Jaquez RN  Outcome: Ongoing     Problem: SAFETY  Goal: Free from intentional harm  1/6/2020 1054 by Jose Luis Jaquez RN  Outcome: Ongoing     Problem: DAILY CARE  Goal: Daily care needs are met  1/6/2020 1054 by Jose Luis Jaquez RN  Outcome: Ongoing     Problem: PAIN  Goal: Patient's pain/discomfort is manageable  1/6/2020 1054 by Jose Luis Jaquez RN  Outcome: Ongoing     Problem: KNOWLEDGE DEFICIT  Goal: Patient/S.O. demonstrates understanding of disease process, treatment plan, medications, and discharge instructions.   1/6/2020 1054 by Jose Luis Jaquez RN  Outcome: Ongoing     Problem: Falls - Risk of:  Goal: Will remain free from falls  Description  Will remain free from falls  1/6/2020 1054 by Jose Luis Jaquez RN  Outcome: Ongoing     Problem: Falls - Risk of:  Goal: Absence of physical injury  Description  Absence of physical injury  1/6/2020 1054 by Jose Luis Jaquez RN  Outcome: Ongoing     Problem: Cardiac:  Goal: Ability to maintain vital signs within normal range will improve  Description  Ability to maintain vital signs within normal range will improve  1/6/2020 1054 by Jose Luis Jaquez RN  Outcome: Ongoing     Problem: Cardiac:  Goal: Cardiovascular alteration will improve  Description  Cardiovascular alteration will improve  1/6/2020 1054 by Jose Luis Jaquez RN  Outcome: Ongoing
Problem: SAFETY  Goal: Free from accidental physical injury  1/7/2020 0927 by Claudia Rivera RN  Outcome: Completed  1/7/2020 0138 by Thomas Koroma RN  Outcome: Ongoing  Goal: Free from intentional harm  1/7/2020 0927 by Claudia Rivera RN  Outcome: Completed  1/7/2020 0138 by Thomas Koroma RN  Outcome: Ongoing     Problem: DAILY CARE  Goal: Daily care needs are met  1/7/2020 0927 by Claudia Rivera RN  Outcome: Completed  1/7/2020 0138 by Thomas Koroma RN  Outcome: Ongoing     Problem: PAIN  Goal: Patient's pain/discomfort is manageable  1/7/2020 0927 by Claudia Rivera RN  Outcome: Completed  1/7/2020 0138 by Thomas Koroma RN  Outcome: Ongoing     Problem: KNOWLEDGE DEFICIT  Goal: Patient/S.O. demonstrates understanding of disease process, treatment plan, medications, and discharge instructions.   1/7/2020 0927 by Claudia Rivera RN  Outcome: Completed  1/7/2020 0138 by Thomas Koroma RN  Outcome: Ongoing     Problem: Falls - Risk of:  Goal: Will remain free from falls  1/7/2020 0927 by Claudia Rivera RN  Outcome: Completed  1/7/2020 0138 by Thomas Koroma RN  Outcome: Ongoing  Goal: Absence of physical injury  1/7/2020 0927 by Claudia Rivera RN  Outcome: Completed  1/7/2020 0138 by Thomas Koroma RN  Outcome: Ongoing     Problem: Cardiac:  Goal: Ability to maintain vital signs within normal range will improve  1/7/2020 0927 by Claudia Rivera RN  Outcome: Completed  1/7/2020 0138 by Thomas Koroma RN  Outcome: Ongoing  Goal: Cardiovascular alteration will improve  1/7/2020 0927 by Claudia Rivera RN  Outcome: Completed  1/7/2020 0138 by Thomas Koroma RN  Outcome: Ongoing
Problem: SAFETY  Goal: Free from accidental physical injury  Outcome: Ongoing  Goal: Free from intentional harm  Outcome: Ongoing     Problem: DAILY CARE  Goal: Daily care needs are met  Outcome: Ongoing     Problem: PAIN  Goal: Patient's pain/discomfort is manageable  Outcome: Ongoing     Problem: KNOWLEDGE DEFICIT  Goal: Patient/S.O. demonstrates understanding of disease process, treatment plan, medications, and discharge instructions.   Outcome: Ongoing     Problem: Falls - Risk of:  Goal: Will remain free from falls  Description  Will remain free from falls  Outcome: Ongoing  Goal: Absence of physical injury  Description  Absence of physical injury  Outcome: Ongoing     Problem: Cardiac:  Goal: Ability to maintain vital signs within normal range will improve  Description  Ability to maintain vital signs within normal range will improve  Outcome: Ongoing  Goal: Cardiovascular alteration will improve  Description  Cardiovascular alteration will improve  Outcome: Ongoing   Electronically signed by Javier Nino RN on 1/6/2020 at 2:02 AM
Problem: SAFETY  Goal: Free from accidental physical injury  Outcome: Ongoing  Goal: Free from intentional harm  Outcome: Ongoing     Problem: DAILY CARE  Goal: Daily care needs are met  Outcome: Ongoing     Problem: PAIN  Goal: Patient's pain/discomfort is manageable  Outcome: Ongoing     Problem: KNOWLEDGE DEFICIT  Goal: Patient/S.O. demonstrates understanding of disease process, treatment plan, medications, and discharge instructions.   Outcome: Ongoing     Problem: Falls - Risk of:  Goal: Will remain free from falls  Outcome: Ongoing  Goal: Absence of physical injury  Outcome: Ongoing     Problem: Cardiac:  Goal: Ability to maintain vital signs within normal range will improve  Outcome: Ongoing  Goal: Cardiovascular alteration will improve  Outcome: Ongoing
Spontaneous, unlabored and symmetrical

## 2020-01-07 NOTE — CARE COORDINATION
[address] Transitions Interview     2020    Patient: Dalila Cherry Patient : 1950   MRN: [id number]  Reason for Admission:   RARS: Readmission Risk Score: 8         Spoke with: Dalila Cherry        Readmission Risk  Patient Active Problem List   Diagnosis    Coronary atherosclerosis due to calcified coronary lesion of native artery    Hypertension    Class 2 obesity due to excess calories without serious comorbidity with body mass index (BMI) of 38.0 to 38.9 in adult    ELROY on CPAP    Sleep apnea, obstructive    Right ventricular enlargement    Biatrial enlargement    Type 2 diabetes mellitus with complication, without long-term current use of insulin (HCC)    Syncope    V[facility]ach [facility]    Ventricular tachyarrhythmia [facility])       Inpatient Assessment  Care Transitions Summary    Care Transitions Inpatient Review  Medication Review  Are you able to afford your medications?:  Yes  How often do you have difficulty taking your medications?:  I always take them as prescribed. Housing Review  Who do you live with?:  Partner/Spouse/SO  Are you an active caregiver in your home?:  No  Social Support  Do you have a ?:  No  Do you have a [address] [place] [address]?:  No  Durable Medical Equipment  Patient DME:  Straight cane  Patient Home Equipment:  CPAP  Functional Review  Ability to seek help/take action for Emergent/Urgent situations i.e. fire, crime, inclement weather or health crisis. :  Independent  Ability handle personal hygiene needs (bathing/dressing/grooming): Independent  Ability to manage medications: Independent  Ability to prepare food:  Independent  Ability to maintain home (clean home, laundry): Independent  Ability to drive and/or has transportation:  Independent  Ability to do shopping:  Independent  Ability to manage finances:   Independent  Is patient able to live independently?:  Yes  Hearing and Vision  Visual Impairment:  Visual impairment (Glasses/contacts)  Hearing Impairment:  Hard of hearing  Care Transitions Interventions                                 Follow Up : Met at bedside with . Duglas Lynch, discussed BPCI-A process and presented the CMS Beneficiary Letter. Contact information given. Alphonse Coffey is agreeable with appropriate follow up after discharge. Met with patient just prior to discharge. He and his wife live in their own home. He is independent of ADL's, meds, meals, and transportation. Wife takes care of finances. He has some canes in the home and CPAP. Will follow as inpatient and at discharge as indicated.      Future Appointments   Date Time Provider Lenny Bell   8/5/2020  9:15 AM Quinn Marrero MD [id number]   9/29/2020  1:30 PM MD [facility] [place] [facility]       Health Maintenance  Health Maintenance Due   Topic Date Due    A1C test (Diabetic or Prediabetic)  08/25/1961    Lipid screen  08/25/1961    Diabetic microalbuminuria test  08/25/1969       Dakota Aguilar RN

## 2020-01-08 NOTE — CONSULTS
Patient:  Day Lorenzana  [id number]           Patient Active Problem List     Diagnosis Date Noted    Syncope         Priority: High    Ventricular tachyarrhythmia ([facility] 01/04/2020       Priority: Low    V-tach ([facility] 01/03/2020       Priority: Low    Right ventricular enlargement 01/28/2019       Priority: Low    Biatrial enlargement 01/28/2019       Priority: Low    Type 2 diabetes mellitus with complication, without long-term current use of insulin ([facility] 01/28/2019       Priority: Low    Sleep apnea, obstructive 09/21/2017       Priority: Low    Coronary atherosclerosis due to calcified coronary lesion of native artery         Priority: Low    Hypertension         Priority: Low    Class 2 obesity due to excess calories without serious comorbidity with body mass index (BMI) of 38.0 to 38.9 in adult         Priority: Low    ELROY on CPAP         Priority: Low         Admit Date:  (Not on file)     Admission Problem List: Present on Admission:  **None**        Cardiac Specific Data:       Specialty Problems                 Cardiology Problems      Syncope             Coronary atherosclerosis due to calcified coronary lesion of native artery             Hypertension             Biatrial enlargement             Right ventricular enlargement             V-tach [facility])             Ventricular tachyarrhythmia ([facility]                     Subjective:  Mr. Larissa Sanchez who was admitted over the weekend after positive stress test 1/2/2020 with ventricular tachycardia and DST. He is scheduled for cardiac catheterization.     Objective: There were no vitals taken for this visit.        Intake/Output Summary (Last 24 hours) at 1/6/2020 2076  Last data filed at 1/6/2020 0513      Gross per 24 hour   Intake 1810 ml   Output 2650 ml   Net -840 ml         Home Medications           Prior to Admission medications    Medication Sig Start Date End Date Taking?  Authorizing Provider   isosorbide mononitrate (IMDUR) 30 MG extended release tablet Take 1 tablet by mouth daily 1/4/20     Roxana Lozada MD   predniSONE (DELTASONE) 50 MG tablet Take 1 tablet in the morning, 1 tablet in the afternoon, and 1 tablet in the evening the day before your procedure 1/4/20     Roxana Lozada MD   pravastatin (PRAVACHOL) 20 MG tablet Take 1 tablet by mouth daily 8/21/19     CHRISTOPHER Ott   sitaGLIPtan-metformin (JANUMET)  MG per tablet Take 1 tablet by mouth daily       Historical Provider, MD   budesonide-formoterol (SYMBICORT) 160-4.5 MCG/ACT AERO Inhale 2 puffs into the lungs 2 times daily       Historical Provider, MD   aclidinium (TUDORZA PRESSAIR) 400 MCG/ACT AEPB inhaler Inhale 1 puff into the lungs 2 times daily       Historical Provider, MD   aspirin 81 MG tablet Take 81 mg by mouth daily       Historical Provider, MD   propranolol (INDERAL LA) 120 MG extended release capsule Take 120 mg by mouth daily       Historical Provider, MD   albuterol sulfate HFA (PROAIR HFA) 108 (90 BASE) MCG/ACT inhaler Inhale 2 puffs into the lungs every 6 hours as needed for Wheezing       Historical Provider, MD   EPINEPHrine (EPIPEN 2-KEENA) 0.3 MG/0.3ML SOAJ injection Inject 0.3 mg into the muscle as needed Use as directed for allergic reaction       Historical Provider, MD   azelastine HCl 0.15 % SOLN by Nasal route       Historical Provider, MD   fluticasone (FLONASE) 50 MCG/ACT nasal spray 1 spray by Nasal route daily       Historical Provider, MD   lansoprazole (PREVACID) 30 MG delayed release capsule Take 30 mg by mouth daily       Historical Provider, MD   vitamin D (ERGOCALCIFEROL) 40116 UNITS CAPS capsule Take 50,000 Units by mouth once a week  12/1/16     Historical Provider, MD   ferrous sulfate 325 (65 FE) MG tablet Take 325 mg by mouth daily (with breakfast)  10/4/16     Historical Provider, MD   FLUoxetine (PROZAC) 20 MG capsule Take 20 mg by mouth daily  12/1/16     Historical Provider, MD   hydrochlorothiazide (HYDRODIURIL) 12.5 MG tablet Take 12.5 mg by mouth daily  9/28/16     Historical Provider, MD   levocetirizine (XYZAL) 5 MG tablet Take 5 mg by mouth nightly  11/14/16     Historical Provider, MD   ranitidine (ZANTAC) 150 MG tablet Take 150 mg by mouth nightly  12/1/16     Historical Provider, MD   timolol (TIMOPTIC-XE) 0.5 % ophthalmic gel-forming Place 1 drop into both eyes nightly  9/1/16     Historical Provider, MD                  TELEMETRY: Sinus      Physical Exam:        Physical Exam  Constitutional: He is oriented to person, place, and time. He appears well-developed.   Severe truncal and generalized obesity.   HENT:   Mouth/Throat: No oropharyngeal exudate. Eyes: Right eye exhibits no discharge. Left eye exhibits no discharge. No scleral icterus. Neck: No JVD present. No thyromegaly present. Cardiovascular: Normal rate and regular rhythm. Exam reveals no gallop and no friction rub.   No murmur heard. Pulmonary/Chest: No stridor. No respiratory distress. He has no wheezes. He has no rales. Abdominal: Soft. Bowel sounds are normal. He exhibits no distension and no mass. There is no hepatosplenomegaly. There is no tenderness. There is no rebound and no guarding. Musculoskeletal: He exhibits no edema or deformity. Neurological: He is alert and oriented to person, place, and time. He displays normal reflexes. He exhibits normal muscle tone. Coordination normal.   Skin: Skin is warm. No rash noted. No erythema. No pallor. Psychiatric: He has a normal mood and affect.               Lab Data:  CBC:       Recent Labs     01/03/20  1659   WBC 10.7   HGB 13.1*   HCT 40.2*   MCV 88.7         BMP:       Recent Labs     01/03/20  1659   *   K 4.3   CL 96*   CO2 26   BUN 13   CREATININE 0.9      LIVER PROFILE:       Recent Labs     01/03/20  1659   AST 14   ALT 16   BILITOT 0.4   ALKPHOS 60      PT/INR: No results for input(s): PROTIME, INR in the last 72 hours. APTT: No results for input(s): APTT in the last 72 hours.   BNP:  No results for input(s): BNP in the last 72 hours. CK, CKMB, Troponin: @LABRCNT (CKTOTAL:3, CKMB:3, TROPONINI:3)@     IMAGING:  No results found. ECHOCARDIOGRAM STRESS TEST:ECHOCARDIOGRAM PHARMACOLOGICAL STRESS TEST (aka   DO.     Study Location: Echo Lab  Technical Quality: Adequate visualization     Patient Status: Outpatient     Contrast Medium: Definity. Amount - 1.5 ml     Rhythm: Within normal limits HR: 60 bpm BP: 121/79 mmHg     Indications:Syncope and Coronary artery disease.      Conclusions      Summary   Dobutamine stress echocardiogram positive due to runs of ventricular   tachycardia. There was no significant ST segment changes noted. There was   no clinical or echocardiographic evidence of myocardial ischemia. Test was   supervised by Dr. Erin Valdez correlation is advised.           Assessment and Plan:     72-year-old gentleman with past medical history of coronary atherosclerosis based on CT scan with coronary calcification, asymptomatic, diabetes mellitus, hypertension with mild LVH, normal LV ejection fraction, obesity with prior history of bariatric surgery with positive dobutamine stress echo with high risk features of ventricular tachycardia referred for cardiac catheterization.     Risks, benefits, alternatives of cardiac catheterization/PCI discussed with the patient and full informed consent obtained.   Acceptable Mallampati score  Consent for moderate conscious sedation  ASA 3     Vikash Mcmanus MD 1/6/2020 6:52 AM

## 2020-02-12 RX ORDER — PRAVASTATIN SODIUM 20 MG
20 TABLET ORAL DAILY
Qty: 90 TABLET | Refills: 3 | Status: SHIPPED | OUTPATIENT
Start: 2020-02-12

## 2020-07-24 ENCOUNTER — TELEPHONE (OUTPATIENT)
Dept: CARDIOLOGY | Age: 70
End: 2020-07-24

## 2020-07-24 NOTE — TELEPHONE ENCOUNTER
7/24 called need to r/s this apt with Soraya Sample on 8/5 due to [place] being on vacation/out of the office, can be the next open avalibility, or a different NP if that is what the pt choses.   attempt 1

## 2020-07-27 ENCOUNTER — TELEPHONE (OUTPATIENT)
Dept: CARDIOLOGY | Age: 70
End: 2020-07-27

## 2020-07-27 NOTE — PROGRESS NOTES
BERNARDINO Finley  [facility]   Respiratory Disease Clinic  [address] [place] [address]  Phone: [phone]  Fax: [phone]     Wayne Linares is a 69 y.o. male.   CC:   Chief Complaint   Patient presents with   • Stage 2 moderate GARTH        HPI:  Mr. Linares is a 69 year old male patient of Dr. Sparrow's last seen in the office in June 2019. He has known COPD, CAD, DM< HTN, RBBB, gastric bypass, and DEUCE-CPAP. At last visit he was stable on Dulera with use of rescue inhaler PRN. Last PFTs in June 2019 showed FEV1 78% predicted which was an improvement from 2018. He states he saw his PCP about 2 months ago and reported his allergies have bothered him worse this year than in past. He took him off both his allergie medications and inhalers. He gets SOB if it is hot humid or exerts himself. He felt the Tudorza and symbicort worked well, especially when he first started taking them. He also is getting a lot of drainage. Overall he feels his symptoms have not worsened but just not being controlled.     Patient denies fever, chills, cough, shortness of breath or difficulty breathing, fatigue, muscle or body aches, new onset headache, new loss of taste or smell, sore throat, congestion or runny nose, nausea or vomiting, diarrhea.  Patient denies any known exposure to a person under investigation or positive for COVID-19.  Patient is wearing mask today.       The following portions of the patient's history were reviewed and updated as appropriate: allergies, current medications, past family history, past medical history, past social history, past surgical history and problem list.    Past Medical History:   Diagnosis Date   • Arthritis    • Bundle branch block, right    • COPD (chronic obstructive pulmonary disease) (CMS/HCC)    • Coronary artery disease    • Diabetes mellitus (CMS/HCC)    • Hypertension    • Irregular heart beat    • Sleep apnea        Family History   Problem Relation  "Age of Onset   • Cancer Mother         breast   • Heart disease Father    • Cancer Brother         bladder   • Colon cancer Neg Hx    • Colon polyps Neg Hx    • Esophageal cancer Neg Hx    • Liver cancer Neg Hx    • Liver disease Neg Hx    • Rectal cancer Neg Hx    • Stomach cancer Neg Hx        Social History     Socioeconomic History   • Marital status:      Spouse name: Not on file   • Number of children: Not on file   • Years of education: Not on file   • Highest education level: Not on file   Tobacco Use   • Smoking status: Never Smoker   • Smokeless tobacco: Never Used   Substance and Sexual Activity   • Alcohol use: Yes     Alcohol/week: 6.0 standard drinks     Types: 6 Shots of liquor per week     Comment: social   • Drug use: No       Review of Systems   Constitutional: Negative for chills, fatigue and fever.   HENT: Negative for congestion, postnasal drip and sore throat.    Eyes: Negative for blurred vision, double vision and visual disturbance.   Respiratory: Negative for cough, shortness of breath and wheezing.    Cardiovascular: Negative for chest pain, palpitations and leg swelling.   Gastrointestinal: Negative for diarrhea, nausea and vomiting.   Endocrine: Negative for cold intolerance, heat intolerance and polydipsia.   Musculoskeletal: Negative for back pain, gait problem and myalgias.   Skin: Negative for color change, pallor and rash.   Allergic/Immunologic: Negative for environmental allergies, food allergies and immunocompromised state.   Neurological: Negative for dizziness, syncope and confusion.   Hematological: Negative for adenopathy. Does not bruise/bleed easily.   Psychiatric/Behavioral: Negative for agitation, behavioral problems and sleep disturbance.       /80   Pulse 62   Temp 99.1 °F (37.3 °C)   Ht 188 cm (74\")   Wt 133 kg (293 lb)   SpO2 97% Comment: RA  BMI 37.62 kg/m²     Physical Exam   Constitutional: He is oriented to person, place, and time. He appears " well-developed and well-nourished. No distress.   HENT:   Head: Normocephalic and atraumatic.   Eyes: Pupils are equal, round, and reactive to light. Conjunctivae are normal.   Neck: Normal range of motion. Neck supple.   Cardiovascular: Normal rate, regular rhythm and normal heart sounds. Exam reveals no gallop and no friction rub.   No murmur heard.  Pulmonary/Chest: Effort normal and breath sounds normal. He has no wheezes.   Abdominal: Soft. Bowel sounds are normal.   Musculoskeletal: He exhibits no edema or deformity.   Lymphadenopathy:     He has no cervical adenopathy.   Neurological: He is oriented to person, place, and time.   Skin: Skin is warm and dry. He is not diaphoretic.   Psychiatric: He has a normal mood and affect. His behavior is normal. Judgment and thought content normal.       Pulmonary Functions Testing Results:    PFT Values        Some values may be hidden. Unless noted otherwise, only the newest values recorded on each date are displayed.         Old Values PFT Results 6/4/19   FVC 88%   FEV1 78%   FEV1/FVC 70.46%      Pre Drug PFT Results 6/4/19   No data to display.      Post Drug PFT Results 6/4/19   No data to display.      Other Tests PFT Results 6/4/19   No data to display.           Results for orders placed in visit on 06/04/19   Pulmonary Function Test     My interpretation: no new     CXR: no new         Problem List Items Addressed This Visit        Respiratory    Obstructive sleep apnea syndrome    Stage 2 moderate COPD by GOLD classification (CMS/[facility]) - Primary    Overview     fev1 78% pred 2019         Relevant Medications    aclidinium bromide (TUDORZA PRESSAIR) 400 MCG/ACT aerosol powder  powder for inhalation    SYMBICORT 160-4.5 MCG/ACT inhaler       Digestive    Morbidly obese (CMS/[facility])      Other Visit Diagnoses     Seasonal allergies            Patient's Body mass index is 37.62 kg/m². BMI is above normal parameters. Recommendations include: referral to primary  care.      Assessment/Plan      Resume Tudorza and symbicort as previously prescribed. He is asked to notify his PCP of restarting the inhalers. I am unable to see PCP note and unsure why he was discontinued. He is on xyzal for skin allergies. He is advised he can also resume his Flonase if he found benefit.   Follow up in 6 months with Dr. Kyara Lopez, APRN  7/29/2020  11:01    Return in about 6 months (around 1/29/2021), or with Dr. Sparrow .    This dictation was generated by voice recognition computer software. Although all attempts are made to edit dictation for accuracy, there may be errors in the transcription that are not intended.

## 2020-07-27 NOTE — TELEPHONE ENCOUNTER
7/27 called needing to r/s apt with karen on 8/5 as she will be out of the office on the weekend. Can be on any open slot with her or another NP.   Attempt 2

## 2020-07-29 ENCOUNTER — OFFICE VISIT (OUTPATIENT)
Dept: PULMONOLOGY | Facility: CLINIC | Age: 70
End: 2020-07-29

## 2020-07-29 VITALS
TEMPERATURE: 99.1 F | WEIGHT: 293 LBS | OXYGEN SATURATION: 97 % | DIASTOLIC BLOOD PRESSURE: 80 MMHG | HEIGHT: 74 IN | BODY MASS INDEX: 37.6 KG/M2 | SYSTOLIC BLOOD PRESSURE: 130 MMHG | HEART RATE: 62 BPM

## 2020-07-29 DIAGNOSIS — J44.9 STAGE 2 MODERATE COPD BY GOLD CLASSIFICATION (HCC): Primary | ICD-10-CM

## 2020-07-29 DIAGNOSIS — J30.2 SEASONAL ALLERGIES: ICD-10-CM

## 2020-07-29 DIAGNOSIS — E66.01 MORBIDLY OBESE (HCC): ICD-10-CM

## 2020-07-29 DIAGNOSIS — G47.33 OBSTRUCTIVE SLEEP APNEA SYNDROME: ICD-10-CM

## 2020-07-29 PROCEDURE — 99213 OFFICE O/P EST LOW 20 MIN: CPT | Performed by: NURSE PRACTITIONER

## 2020-07-29 RX ORDER — PRAVASTATIN SODIUM 10 MG
10 TABLET ORAL DAILY
COMMUNITY

## 2020-07-29 RX ORDER — BUDESONIDE AND FORMOTEROL FUMARATE DIHYDRATE 160; 4.5 UG/1; UG/1
2 AEROSOL RESPIRATORY (INHALATION)
Qty: 3 INHALER | Refills: 4 | Status: SHIPPED | OUTPATIENT
Start: 2020-07-29

## 2020-07-30 ENCOUNTER — OFFICE VISIT (OUTPATIENT)
Dept: GASTROENTEROLOGY | Facility: CLINIC | Age: 70
End: 2020-07-30

## 2020-07-30 VITALS
HEIGHT: 74 IN | WEIGHT: 282 LBS | SYSTOLIC BLOOD PRESSURE: 134 MMHG | OXYGEN SATURATION: 97 % | HEART RATE: 70 BPM | TEMPERATURE: 97.3 F | DIASTOLIC BLOOD PRESSURE: 80 MMHG | BODY MASS INDEX: 36.19 KG/M2

## 2020-07-30 DIAGNOSIS — K21.9 GASTROESOPHAGEAL REFLUX DISEASE, ESOPHAGITIS PRESENCE NOT SPECIFIED: Primary | ICD-10-CM

## 2020-07-30 DIAGNOSIS — Z86.010 HISTORY OF ADENOMATOUS POLYP OF COLON: ICD-10-CM

## 2020-07-30 DIAGNOSIS — K22.70 BARRETT'S ESOPHAGUS DETERMINED BY BIOPSY: ICD-10-CM

## 2020-07-30 PROCEDURE — 99213 OFFICE O/P EST LOW 20 MIN: CPT | Performed by: NURSE PRACTITIONER

## 2020-07-30 RX ORDER — CHLORAL HYDRATE 500 MG
CAPSULE ORAL
COMMUNITY
End: 2021-02-01 | Stop reason: ALTCHOICE

## 2020-07-30 NOTE — PROGRESS NOTES
Chief Complaint   Patient presents with   • Endoscopy     7- had endo gonzalez's 3 year recall       PCP: Saurabh Orellana MD  REFER: No ref. provider found    Subjective     HPI    Wayne Linares is a 69 y.o. male who presents with hx of GERD for several years.  This is described as stable.   He is maintained on prevacid daily.  He has a history of Barretts esophagus determined by biopsy (diangosed over 20 years ago). The course is constant.  Last EGD from 2017 reviewed.  He does not have complaints of :N/V, no changes in bowels, no wt loss, no BRBPR.  No melena.  No abdominal pain.   No dysphagia.    CScope (Dr Brar) 2017-tubular adenoma -5 yr  Endoscopy (Dr Brar) 2017-marked chronic esophagogastritis, negative intestinal metaplasia         Past Medical History:   Diagnosis Date   • Arthritis    • Bundle branch block, right    • COPD (chronic obstructive pulmonary disease) (CMS/[facility])    • Coronary artery disease    • Diabetes mellitus (CMS/[facility])    • Hypertension    • Irregular heart beat    • Sleep apnea      Outpatient Medications Marked as Taking for the 7/30/20 encounter (Office Visit) with Jameel Golden APRN   Medication Sig Dispense Refill   • aclidinium bromide (TUDORZA PRESSAIR) 400 MCG/ACT aerosol powder  powder for inhalation Inhale 1 puff 2 (Two) Times a Day. 3 each 4   • albuterol (PROVENTIL HFA;VENTOLIN HFA) 108 (90 BASE) MCG/ACT inhaler Inhale.     • aspirin 81 MG tablet Take  by mouth.     • azelastine (ASTEPRO) 0.15 % solution nasal spray into each nostril.     • ferrous sulfate 325 (65 FE) MG tablet Take  by mouth.     • FLUoxetine (PROzac) 20 MG capsule TAKE 1 CAPSULE BY MOUTH DAILY IN THE MORNING  2   • fluticasone (FLONASE) 50 MCG/ACT nasal spray into each nostril.     • hydrochlorothiazide (HYDRODIURIL) 12.5 MG tablet      • Iron Combinations (IRON COMPLEX PO) Take  by mouth.     • lansoprazole (PREVACID) 30 MG capsule      • levocetirizine (XYZAL) 5 MG tablet Take  by  mouth.     • Omega-3 Fatty Acids (FISH OIL) 1000 MG capsule capsule Take  by mouth Daily With Breakfast.     • pravastatin (PRAVACHOL) 10 MG tablet Take 10 mg by mouth Daily.     • propranolol LA (INDERAL LA) 120 MG 24 hr capsule      • raNITIdine (ZANTAC) 150 MG tablet TAKE 1 TABLET BY MOUTH TWO TIMES A DAY  2   • Semaglutide (OZEMPIC, 1 MG/DOSE, SC) Inject  under the skin into the appropriate area as directed 1 (One) Time Per Week.     • SYMBICORT 160-4.5 MCG/ACT inhaler Inhale 2 puffs 2 (Two) Times a Day. 3 inhaler 4   • timolol (TIMOPTIC-XR) 0.5 % ophthalmic gel-forming 1 SQUIRT IN BOTH EYES DAILY  5   • vitamin D (ERGOCALCIFEROL) 32686 UNITS capsule capsule        Allergies   Allergen Reactions   • Iodides Anaphylaxis   • Shellfish-Derived Products Anaphylaxis     Sea foods   • Penicillins Rash   • Sulfa Antibiotics Rash     Social History     Socioeconomic History   • Marital status:      Spouse name: Not on file   • Number of children: Not on file   • Years of education: Not on file   • Highest education level: Not on file   Tobacco Use   • Smoking status: Never Smoker   • Smokeless tobacco: Never Used   Substance and Sexual Activity   • Alcohol use: Not Currently   • Drug use: No     Family History   Problem Relation Age of Onset   • Cancer Mother         breast   • Heart disease Father    • Cancer Brother         bladder   • Colon cancer Neg Hx    • Colon polyps Neg Hx    • Esophageal cancer Neg Hx    • Liver cancer Neg Hx    • Liver disease Neg Hx    • Rectal cancer Neg Hx    • Stomach cancer Neg Hx      Review of Systems   Constitutional: Negative for fatigue, fever and unexpected weight change.   HENT: Negative for hearing loss, sore throat and voice change.    Eyes: Negative for visual disturbance.   Respiratory: Negative for cough, shortness of breath and wheezing.    Cardiovascular: Negative for chest pain and palpitations.   Gastrointestinal: Negative for abdominal pain, blood in stool and  "vomiting.   Endocrine: Negative for polydipsia and polyuria.   Genitourinary: Negative for difficulty urinating, dysuria, hematuria and urgency.   Musculoskeletal: Negative for joint swelling and myalgias.   Skin: Negative for color change, rash and wound.   Neurological: Negative for dizziness, tremors, seizures and syncope.   Hematological: Does not bruise/bleed easily.   Psychiatric/Behavioral: Negative for agitation and confusion. The patient is not nervous/anxious.      Objective   Vitals:    07/30/20 1238   BP: 134/80   Pulse: 70   Temp: 97.3 °F (36.3 °C)   SpO2: 97%   Weight: 128 kg (282 lb)   Height: 188 cm (74\")     Physical Exam   Constitutional: He is oriented to person, place, and time. He appears well-developed and well-nourished. He is cooperative.   HENT:   Head: Normocephalic and atraumatic.   Eyes: Pupils are equal, round, and reactive to light. Conjunctivae are normal. No scleral icterus.   Neck: Normal range of motion. Neck supple. No JVD present. No thyroid mass and no thyromegaly present.   Cardiovascular: Normal rate, regular rhythm and normal heart sounds. Exam reveals no gallop and no friction rub.   No murmur heard.  Pulmonary/Chest: Effort normal and breath sounds normal. No accessory muscle usage. No respiratory distress. He has no wheezes. He has no rales.   Abdominal: Soft. Normal appearance and bowel sounds are normal. He exhibits no distension, no ascites and no mass. There is no hepatosplenomegaly. There is no tenderness. There is no rebound and no guarding.   Musculoskeletal: Normal range of motion. He exhibits no edema or tenderness.     Vascular Status -  His right foot exhibits normal foot vasculature  and no edema. His left foot exhibits normal foot vasculature  and no edema.  Lymphadenopathy:     He has no cervical adenopathy.   Neurological: He is alert and oriented to person, place, and time. He has normal strength. Gait normal.   Skin: Skin is warm, dry and intact. No rash " noted.     Imaging Results (Most Recent)     None        Body mass index is 36.21 kg/m².  Assessment/Plan   Wayne was seen today for endoscopy.    Diagnoses and all orders for this visit:    Gastroesophageal reflux disease, esophagitis presence not specified  -     Case Request; Standing  -     Case Request    Parrish's esophagus determined by biopsy    History of adenomatous polyp of colon        ESOPHAGOGASTRODUODENOSCOPY WITH ANESTHESIA (N/A)    Take PPI 30 min prior to breakfast   Decrease caffeine, nicotine, etoh- all contribute to acid reflux  Do not eat 2-3 hours within lying down, elevated HOB 4-6 inches    Colonoscopy up to date     The risk of the endoscopy were discussed in detail.  We discussed the risk of perforation (one out of [id number], riskier with dilation), bleeding (one out of 500), and the rare risks of infection, adverse reaction to anesthesia, respiratory failure, cardiac failure including MI and adverse reaction to medications, etc.  We discussed consequences that could occur if a risk were to develop such as the need for hospitalization, blood transfusion, surgical intervention, medications, pain and disability and death.  Alternatives include not doing anything, or pursuing an UGI series which only offers a diagnosis with potential less accuracy compared to egd.  The patient verbalizes understanding and agrees to proceed.       Patient's Body mass index is 36.21 kg/m². BMI is above normal parameters. Recommendations include: no follow up.        Jameel Golden, BERNARDINO  07/30/20

## 2020-07-31 ENCOUNTER — TRANSCRIBE ORDERS (OUTPATIENT)
Dept: ADMINISTRATIVE | Facility: HOSPITAL | Age: 70
End: 2020-07-31

## 2020-07-31 DIAGNOSIS — Z01.818 PRE-OP TESTING: Primary | ICD-10-CM

## 2020-08-04 ENCOUNTER — LAB (OUTPATIENT)
Dept: LAB | Facility: HOSPITAL | Age: 70
End: 2020-08-04

## 2020-08-04 PROCEDURE — C9803 HOPD COVID-19 SPEC COLLECT: HCPCS | Performed by: INTERNAL MEDICINE

## 2020-08-04 PROCEDURE — U0003 INFECTIOUS AGENT DETECTION BY NUCLEIC ACID (DNA OR RNA); SEVERE ACUTE RESPIRATORY SYNDROME CORONAVIRUS 2 (SARS-COV-2) (CORONAVIRUS DISEASE [COVID-19]), AMPLIFIED PROBE TECHNIQUE, MAKING USE OF HIGH THROUGHPUT TECHNOLOGIES AS DESCRIBED BY CMS-2020-01-R: HCPCS | Performed by: INTERNAL MEDICINE

## 2020-08-05 LAB
COVID LABCORP PRIORITY: NORMAL
SARS-COV-2 RNA RESP QL NAA+PROBE: NOT DETECTED

## 2020-08-07 ENCOUNTER — HOSPITAL ENCOUNTER (OUTPATIENT)
Facility: HOSPITAL | Age: 70
Setting detail: HOSPITAL OUTPATIENT SURGERY
Discharge: HOME | End: 2020-08-07
Attending: INTERNAL MEDICINE | Admitting: INTERNAL MEDICINE

## 2020-08-07 ENCOUNTER — TELEPHONE (OUTPATIENT)
Dept: GASTROENTEROLOGY | Facility: CLINIC | Age: 70
End: 2020-08-07

## 2020-08-07 ENCOUNTER — ANESTHESIA EVENT (OUTPATIENT)
Dept: GASTROENTEROLOGY | Facility: HOSPITAL | Age: 70
End: 2020-08-07

## 2020-08-07 ENCOUNTER — ANESTHESIA (OUTPATIENT)
Dept: GASTROENTEROLOGY | Facility: HOSPITAL | Age: 70
End: 2020-08-07

## 2020-08-07 VITALS
SYSTOLIC BLOOD PRESSURE: 123 MMHG | OXYGEN SATURATION: 96 % | HEIGHT: 74 IN | WEIGHT: 284 LBS | HEART RATE: 62 BPM | DIASTOLIC BLOOD PRESSURE: 81 MMHG | BODY MASS INDEX: 36.45 KG/M2 | RESPIRATION RATE: 18 BRPM | TEMPERATURE: 97.2 F

## 2020-08-07 DIAGNOSIS — K21.9 GASTROESOPHAGEAL REFLUX DISEASE, ESOPHAGITIS PRESENCE NOT SPECIFIED: ICD-10-CM

## 2020-08-07 LAB — GLUCOSE BLDC GLUCOMTR-MCNC: 165 MG/DL (ref 70–130)

## 2020-08-07 PROCEDURE — 43239 EGD BIOPSY SINGLE/MULTIPLE: CPT | Performed by: INTERNAL MEDICINE

## 2020-08-07 PROCEDURE — 25010000002 PROPOFOL 10 MG/ML EMULSION: Performed by: NURSE ANESTHETIST, CERTIFIED REGISTERED

## 2020-08-07 PROCEDURE — 82962 GLUCOSE BLOOD TEST: CPT

## 2020-08-07 PROCEDURE — 88305 TISSUE EXAM BY PATHOLOGIST: CPT | Performed by: INTERNAL MEDICINE

## 2020-08-07 RX ORDER — SODIUM CHLORIDE 9 MG/ML
500 INJECTION, SOLUTION INTRAVENOUS CONTINUOUS PRN
Status: DISCONTINUED | OUTPATIENT
Start: 2020-08-07 | End: 2020-08-07 | Stop reason: HOSPADM

## 2020-08-07 RX ORDER — SODIUM CHLORIDE 0.9 % (FLUSH) 0.9 %
10 SYRINGE (ML) INJECTION EVERY 12 HOURS SCHEDULED
Status: CANCELLED | OUTPATIENT
Start: 2020-08-07

## 2020-08-07 RX ORDER — SODIUM CHLORIDE 0.9 % (FLUSH) 0.9 %
10 SYRINGE (ML) INJECTION AS NEEDED
Status: CANCELLED | OUTPATIENT
Start: 2020-08-07

## 2020-08-07 RX ORDER — SODIUM CHLORIDE 9 MG/ML
100 INJECTION, SOLUTION INTRAVENOUS CONTINUOUS
Status: CANCELLED | OUTPATIENT
Start: 2020-08-07

## 2020-08-07 RX ORDER — PROPOFOL 10 MG/ML
VIAL (ML) INTRAVENOUS AS NEEDED
Status: DISCONTINUED | OUTPATIENT
Start: 2020-08-07 | End: 2020-08-07 | Stop reason: SURG

## 2020-08-07 RX ORDER — SODIUM CHLORIDE 0.9 % (FLUSH) 0.9 %
10 SYRINGE (ML) INJECTION AS NEEDED
Status: DISCONTINUED | OUTPATIENT
Start: 2020-08-07 | End: 2020-08-07 | Stop reason: HOSPADM

## 2020-08-07 RX ORDER — LIDOCAINE HYDROCHLORIDE 10 MG/ML
0.5 INJECTION, SOLUTION EPIDURAL; INFILTRATION; INTRACAUDAL; PERINEURAL ONCE AS NEEDED
Status: DISCONTINUED | OUTPATIENT
Start: 2020-08-07 | End: 2020-08-07 | Stop reason: HOSPADM

## 2020-08-07 RX ADMIN — PROPOFOL 150 MG: 10 INJECTION, EMULSION INTRAVENOUS at 10:54

## 2020-08-07 RX ADMIN — LIDOCAINE HYDROCHLORIDE 200 MG: 20 INJECTION, SOLUTION INTRAVENOUS at 10:54

## 2020-08-07 RX ADMIN — SODIUM CHLORIDE 500 ML: 9 INJECTION, SOLUTION INTRAVENOUS at 10:38

## 2020-08-07 NOTE — ANESTHESIA PREPROCEDURE EVALUATION
Anesthesia Evaluation     no history of anesthetic complications:  NPO Solid Status: > 8 hours  NPO Liquid Status: > 8 hours           Airway   Mallampati: I  TM distance: >3 FB  Neck ROM: full  Dental - normal exam     Pulmonary - normal exam    breath sounds clear to auscultation  (+) COPD, sleep apnea on CPAP,   (-) asthma, recent URI, not a smoker, no home oxygen  Cardiovascular - normal exam  Exercise tolerance: good (4-7 METS)    Rhythm: regular  Rate: normal    (+) hypertension, CAD, dysrhythmias (RBBB),   (-) pacemaker, past MI, angina, cardiac stents, CABG      Neuro/Psych  (-) seizures, TIA, CVA  GI/Hepatic/Renal/Endo    (+) morbid obesity, GERD,    (-) liver disease, no renal disease, diabetes, no thyroid disorder    Musculoskeletal     Abdominal   (+) obese,    Substance History      OB/GYN          Other                        Anesthesia Plan    ASA 3     MAC     intravenous induction     Anesthetic plan, all risks, benefits, and alternatives have been provided, discussed and informed consent has been obtained with: patient.

## 2020-08-07 NOTE — ANESTHESIA POSTPROCEDURE EVALUATION
Patient: Wayne Linares    Procedure Summary     Date:  08/07/20 Room / Location:  [facility] ENDOSCOPY 5 / [facility] END[facility]    Anesthesia Start:  1052 Anesthesia Stop:  1106    Procedure:  ESOPHAGOGASTRODUODENOSCOPY WITH ANESTHESIA (N/A ) Diagnosis:       Gastroesophageal reflux disease, esophagitis presence not specified      (Gastroesophageal reflux disease, esophagitis presence not specified [K21.9])    Surgeon:  Shivam Brar DO Provider:  Claudia Presley CRNA    Anesthesia Type:  MAC ASA Status:  3          Anesthesia Type: MAC    Vitals  Vitals Value Taken Time   BP     Temp     Pulse 73 8/7/2020 11:06 AM   Resp     SpO2 94 % 8/7/2020 11:06 AM   Vitals shown include unvalidated device data.        Post Anesthesia Care and Evaluation    Patient location during evaluation: PHASE II  Patient participation: complete - patient participated  Level of consciousness: awake and alert  Pain management: adequate  Airway patency: patent  Anesthetic complications: No anesthetic complications  PONV Status: none  Cardiovascular status: acceptable  Respiratory status: acceptable  Hydration status: acceptable

## 2020-08-10 LAB
LAB AP CASE REPORT: NORMAL
LAB AP CLINICAL INFORMATION: NORMAL
PATH REPORT.FINAL DX SPEC: NORMAL
PATH REPORT.GROSS SPEC: NORMAL

## 2020-08-11 ENCOUNTER — TELEPHONE (OUTPATIENT)
Dept: GASTROENTEROLOGY | Facility: CLINIC | Age: 70
End: 2020-08-11

## 2020-08-11 NOTE — TELEPHONE ENCOUNTER
----- Message from Shivam Brar DO sent at 8/10/2020  6:04 PM CDT -----  Regarding: bx  Please send letter stating biopsy for Parrish's esophagus was stable, EGD recommended for 3 years    Thank you      ----- Message -----  From: Lab, Background User  Sent: 8/10/2020   4:08 PM CDT  To: Shivam Brar DO

## 2020-08-13 ENCOUNTER — OFFICE VISIT (OUTPATIENT)
Dept: CARDIOLOGY | Age: 70
End: 2020-08-13
Payer: MEDICARE

## 2020-08-13 VITALS
SYSTOLIC BLOOD PRESSURE: 122 MMHG | HEIGHT: 74 IN | HEART RATE: 62 BPM | BODY MASS INDEX: 37.6 KG/M2 | DIASTOLIC BLOOD PRESSURE: 84 MMHG | WEIGHT: 293 LBS

## 2020-08-13 PROCEDURE — 99214 OFFICE O/P EST MOD 30 MIN: CPT | Performed by: CLINICAL NURSE SPECIALIST

## 2020-08-13 PROCEDURE — 2022F DILAT RTA XM EVC RTNOPTHY: CPT | Performed by: CLINICAL NURSE SPECIALIST

## 2020-08-13 PROCEDURE — 1036F TOBACCO NON-USER: CPT | Performed by: CLINICAL NURSE SPECIALIST

## 2020-08-13 PROCEDURE — G8417 CALC BMI ABV UP PARAM F/U: HCPCS | Performed by: CLINICAL NURSE SPECIALIST

## 2020-08-13 PROCEDURE — 4040F PNEUMOC VAC/ADMIN/RCVD: CPT | Performed by: CLINICAL NURSE SPECIALIST

## 2020-08-13 PROCEDURE — 1123F ACP DISCUSS/DSCN MKR DOCD: CPT | Performed by: CLINICAL NURSE SPECIALIST

## 2020-08-13 PROCEDURE — 3017F COLORECTAL CA SCREEN DOC REV: CPT | Performed by: CLINICAL NURSE SPECIALIST

## 2020-08-13 PROCEDURE — G8427 DOCREV CUR MEDS BY ELIG CLIN: HCPCS | Performed by: CLINICAL NURSE SPECIALIST

## 2020-08-13 PROCEDURE — 3046F HEMOGLOBIN A1C LEVEL >9.0%: CPT | Performed by: CLINICAL NURSE SPECIALIST

## 2020-08-13 RX ORDER — PROPRANOLOL HYDROCHLORIDE 120 MG/1
120 CAPSULE, EXTENDED RELEASE ORAL DAILY
Qty: 90 CAPSULE | Refills: 3 | Status: SHIPPED | OUTPATIENT
Start: 2020-08-13 | End: 2021-08-30

## 2020-08-13 NOTE — PROGRESS NOTES
[facility] Cardiology  [address] [place] [address] Drive Mannie Mccoy, [address]  [address]  Phone: [phone]  Fax: [phone]    OFFICE VISIT:  8/13/2020    [address]: 1950    Reason For Visit:  Cruz Fisher is a 77 y.o. male who is here for 1 Year Follow Up (no cardiac symptoms); Hypertension; and Coronary Artery Disease  History coronary disease and hypertension. Underwent heart catheterization 1/3/2020 showing intermediate grade LAD stenosis. Normal LV systolic function  Continue medical management. He is here in follow up. He states he is doing well. He is active but has no regular cardiovascular exercise  Has ELROY with BiPAP-         Subjective  Cruz Fisher denies exertional chest pain, shortness of breath, orthopnea, paroxysmal nocturnal dyspnea, syncope, presyncope, arrhythmia, edema and fatigue. The patient denies numbness or weakness to suggest cerebrovascular accident or transient ischemic attack. Meli Gunn MD is PCP and follows labs including lipids and glucose   Recently diabetic medications was changed and intent to get better control of his glucose. He feels like it is working. He has suppressed his appetite and has lost a little weight.   Yanira Moctezuma [facility] the [facility]:    Patient Active Problem List    Diagnosis Date Noted    Syncope      Priority: High    Ventricular tachyarrhythmia ([address] 01/04/2020    V-tach ([address] 01/03/2020    Right ventricular enlargement 01/28/2019    Biatrial enlargement 01/28/2019    Type 2 diabetes mellitus with complication, without long-term current use of insulin ([address] 01/28/2019    Sleep apnea, obstructive 09/21/2017    Coronary atherosclerosis due to calcified coronary lesion of native artery     Hypertension     Class 2 obesity due to excess calories without serious comorbidity with body mass index (BMI) of 38.0 to 38.9 in adult     ELROY on CPAP      Past Medical History:   Diagnosis Date    Allergic rhinitis     Clark esophagus     Chronic fatigue syndrome     COPD (chronic obstructive pulmonary disease) (HCC)     Coronary atherosclerosis due to calcified coronary lesion of native artery     Gastritis     GERD (gastroesophageal reflux disease)     Glucose intolerance (impaired glucose tolerance)     History of knee sprain     Hyperlipidemia     Hypertension     Kidney stones     Non-insulin dependent type 2 diabetes mellitus (HCC)     Obesity     ELROY on CPAP     Tremor      Past Surgical History:   Procedure Laterality Date    BARIATRIC SURGERY  02/09/2005    CARDIAC CATHETERIZATION  1998    no blockages    COLONOSCOPY  05/22/2006    ELBOW SURGERY Right 08/21/2013    TOTAL KNEE ARTHROPLASTY Right 03/18/2013    UPPER GASTROINTESTINAL ENDOSCOPY  2002    UPPER GASTROINTESTINAL ENDOSCOPY  04/19/2012    UPPER GASTROINTESTINAL ENDOSCOPY       Family History   Problem Relation Age of Onset    Stroke Mother     Cancer Mother     Heart Attack Father     Heart Disease Father     Arthritis Sister     Cancer Brother         BLADDER    No Known Problems Maternal Grandmother     No Known Problems Maternal Grandfather     Heart Disease Paternal Grandfather     No Known Problems Sister      Social History     Tobacco Use    Smoking status: Never Smoker    Smokeless tobacco: Never Used   Substance Use Topics    Alcohol use:  Yes     Alcohol/week: 6.0 standard drinks     Types: 6 Shots of liquor per week     Frequency: Monthly or less     Drinks per session: 1 or 2     Binge frequency: Never      Current Outpatient Medications   Medication Sig Dispense Refill    propranolol (INDERAL LA) 120 MG extended release capsule Take 1 capsule by mouth daily 90 capsule 3    pravastatin (PRAVACHOL) 20 MG tablet TAKE 1 TABLET BY MOUTH DAILY 90 tablet 3    isosorbide mononitrate (IMDUR) 30 MG extended release tablet Take 1 tablet by mouth daily 30 tablet 5    budesonide-formoterol (SYMBICORT) 160-4.5 MCG/ACT AERO Inhale 2 puffs into the lungs 2 times daily      aclidinium (TUDORZA PRESSAIR) 400 MCG/ACT AEPB inhaler Inhale 1 puff into the lungs 2 times daily      aspirin 81 MG tablet Take 81 mg by mouth daily      albuterol sulfate HFA (PROAIR HFA) 108 (90 BASE) MCG/ACT inhaler Inhale 2 puffs into the lungs every 6 hours as needed for Wheezing      azelastine HCl 0.15 % SOLN by Nasal route      fluticasone (FLONASE) 50 MCG/ACT nasal spray 1 spray by Nasal route daily      lansoprazole (PREVACID) 30 MG delayed release capsule Take 30 mg by mouth daily      vitamin D (ERGOCALCIFEROL) 63578 UNITS CAPS capsule Take 50,000 Units by mouth once a week       ferrous sulfate 325 (65 FE) MG tablet Take 325 mg by mouth daily (with breakfast)       FLUoxetine (PROZAC) 20 MG capsule Take 20 mg by mouth daily       hydrochlorothiazide (HYDRODIURIL) 12.5 MG tablet Take 12.5 mg by mouth daily       levocetirizine (XYZAL) 5 MG tablet Take 5 mg by mouth nightly       ranitidine (ZANTAC) 150 MG tablet Take 150 mg by mouth nightly       timolol (TIMOPTIC-XE) 0.5 % ophthalmic gel-forming Place 1 drop into both eyes nightly        No current facility-administered medications for this visit. Allergies: Iodine; Pcn [penicillins]; and Sulfa antibiotics    Review of Systems  Constitutional - no significant activity change, appetite change, or unexpected weight change. No fever, chills or diaphoresis. No fatigue. HEENT - no significant rhinorrhea or epistaxis. No tinnitus or significant hearing loss. Eyes - no sudden vision change or amaurosis. Respiratory - no significant wheezing, stridor, apnea or cough. + dyspnea on exertion. No resting shortness of breath. Cardiovascular - no exertional chest pain, orthopnea or PND. No sensation of arrhythmia or slow heart rate. No claudication or leg edema. Gastrointestinal - no abdominal swelling or pain. No blood in stool.  No severe constipation, diarrhea, nausea, or without long-term current use of insulin ([facility]       Data:  BP Readings from Last 3 Encounters:   08/13/20 122/84   01/07/20 117/66   01/02/20 (!) 160/80    Pulse Readings from Last 3 Encounters:   08/13/20 62   01/07/20 52   01/02/20 87        Wt Readings from Last 3 Encounters:   08/13/20 293 lb (132.9 kg)   01/07/20 290 lb 9.6 oz (131.8 kg)   01/02/20 300 lb (136.1 kg)     Catheterization 1/7/2020  LMCA: Left Main widely patent.  LAD: LAD mid 60% stenosis. Nonobstructive my IFR testing (IFR equals 0.92). Lesion on Mid LAD: 60% stenosis . LCx: Circumflex with mild luminal irregularities.  RCA: RCA with moderate luminal irregularities. We discussed heart catheterization in detail. We discussed ongoing medical management. Encouraged him to do some regular cardiovascular exercise for stamina, weight control, diabetes control as well as functional capacity    Blood pressure and heart rate controlled. Ongoing medical management includes beta-blocker, long-acting nitrate and diuretic. On statin and low-dose aspirin   States taking medications as prescribed  Stable cardiovascular status. No evidence of overt heart failure, angina or dysrhythmia. Plan  Continue good BP , glucase and cholesterol control   Follow up in 6-9 mos  With Dr. Jose Alfredo Turk   Call with any questions or concerns  Follow up with Junella Leyden, MD for non cardiac problems  Report any new problems  Cardiovascular Fitness-Exercise as tolerated. Strive for 30 minutes of exercise most days of the week. Cardiac / Healthy Diet  Continue current medications as directed  Continue plan of treatment  It is always recommended that you bring your medications bottles with you to each visit - this is for your safety! CHRISTOPHER Boswell    EMR dragon/transcription disclaimer: Much of this encounter note is electronic transcription/translation of spoken language to printed tach.  Electronic translation of spoken language may be erroneous, or at times, nonsensical words or phrases may be inadvertently transcribed.  Although, I have reviewed the note for such errors, some may still exist.

## 2020-09-01 ENCOUNTER — OFFICE VISIT (OUTPATIENT)
Dept: NEUROLOGY | Age: 70
End: 2020-09-01
Payer: MEDICARE

## 2020-09-01 VITALS
SYSTOLIC BLOOD PRESSURE: 118 MMHG | DIASTOLIC BLOOD PRESSURE: 76 MMHG | WEIGHT: 290 LBS | RESPIRATION RATE: 18 BRPM | HEIGHT: 74 IN | BODY MASS INDEX: 37.22 KG/M2 | HEART RATE: 60 BPM

## 2020-09-01 PROCEDURE — 1036F TOBACCO NON-USER: CPT | Performed by: PSYCHIATRY & NEUROLOGY

## 2020-09-01 PROCEDURE — G8417 CALC BMI ABV UP PARAM F/U: HCPCS | Performed by: PSYCHIATRY & NEUROLOGY

## 2020-09-01 PROCEDURE — 99213 OFFICE O/P EST LOW 20 MIN: CPT | Performed by: PSYCHIATRY & NEUROLOGY

## 2020-09-01 PROCEDURE — G8427 DOCREV CUR MEDS BY ELIG CLIN: HCPCS | Performed by: PSYCHIATRY & NEUROLOGY

## 2020-09-01 PROCEDURE — 3017F COLORECTAL CA SCREEN DOC REV: CPT | Performed by: PSYCHIATRY & NEUROLOGY

## 2020-09-01 PROCEDURE — 1123F ACP DISCUSS/DSCN MKR DOCD: CPT | Performed by: PSYCHIATRY & NEUROLOGY

## 2020-09-01 PROCEDURE — 4040F PNEUMOC VAC/ADMIN/RCVD: CPT | Performed by: PSYCHIATRY & NEUROLOGY

## 2020-09-01 NOTE — PROGRESS NOTES
[facility]  [address] [place] [address] Drive, [address] [facility] [address] 150  [facility]  Phone [phone]  Fax [phone]     [facility] Follow Up Encounter  20 9:47 AM CDT    Information:   Patient Name: Katelynn Tomlin  :   1950  Age:   72 y.o. MRN:   [id number]  Account #:  [de-identified]  Today:  20    Provider: Aris Posada M.D. Chief Complaint:   Chief Complaint   Patient presents with    Follow-up    Sleep Apnea       Subjective:   Katelynn Tomlin is a 72 y.o. man with a history of obstructive sleep apnea and tremor who is following up. He uses his CPAP every night. He rests well. He has had the tremor most of his life. He has difficulty writing and typing. He drops things. He has tried primidone, gabapentin, and Valium. He cannot take Topamax due to kidney stones. He is scheduled for DBS in [address] [place] [address] soon. He continues to have aggravating tremor in his hands. This impedes eating, dressing, and other day to day activities.         Objective:     Past Medical History:  Past Medical History:   Diagnosis Date    Allergic rhinitis     Clark esophagus     Chronic fatigue syndrome     COPD (chronic obstructive pulmonary disease) (HCC)     Coronary atherosclerosis due to calcified coronary lesion of native artery     Gastritis     GERD (gastroesophageal reflux disease)     Glucose intolerance (impaired glucose tolerance)     History of knee sprain     Hyperlipidemia     Hypertension     Kidney stones     Non-insulin dependent type 2 diabetes mellitus ([facility] [address]     Obesity     ELROY on CPAP     Tremor        Past Surgical History:   Procedure Laterality Date    BARIATRIC SURGERY  2005    CARDIAC CATHETERIZATION  1998    no blockages    COLONOSCOPY  2006    ELBOW SURGERY Right 2013    TOTAL KNEE ARTHROPLASTY Right 2013    UPPER GASTROINTESTINAL ENDOSCOPY  2002    UPPER GASTROINTESTINAL ENDOSCOPY  2012    UPPER GASTROINTESTINAL ENDOSCOPY Recent Hospitalizations  · None    Significant Injuries  · None    Habits  Richard Hernandez reports that he has never smoked. He has never used smokeless tobacco. He reports current alcohol use of about 6.0 standard drinks of alcohol per week. He reports that he does not use drugs. Family History   Problem Relation Age of Onset    Stroke Mother     Cancer Mother     Heart Attack Father     Heart Disease Father     Arthritis Sister     Cancer Brother         BLADDER    No Known Problems Maternal Grandmother     No Known Problems Maternal Grandfather     Heart Disease Paternal Grandfather     No Known Problems Sister        Social History  Gia Apple is , lives in [place], [place], and is retired.     Medications:  Current Outpatient Medications   Medication Sig Dispense Refill    propranolol (INDERAL LA) 120 MG extended release capsule Take 1 capsule by mouth daily 90 capsule 3    pravastatin (PRAVACHOL) 20 MG tablet TAKE 1 TABLET BY MOUTH DAILY 90 tablet 3    isosorbide mononitrate (IMDUR) 30 MG extended release tablet Take 1 tablet by mouth daily 30 tablet 5    budesonide-formoterol (SYMBICORT) 160-4.5 MCG/ACT AERO Inhale 2 puffs into the lungs 2 times daily      aclidinium (TUDORZA PRESSAIR) 400 MCG/ACT AEPB inhaler Inhale 1 puff into the lungs 2 times daily      aspirin 81 MG tablet Take 81 mg by mouth daily      albuterol sulfate HFA (PROAIR HFA) 108 (90 BASE) MCG/ACT inhaler Inhale 2 puffs into the lungs every 6 hours as needed for Wheezing      azelastine HCl 0.15 % SOLN by Nasal route      fluticasone (FLONASE) 50 MCG/ACT nasal spray 1 spray by Nasal route daily      lansoprazole (PREVACID) 30 MG delayed release capsule Take 30 mg by mouth daily      vitamin D (ERGOCALCIFEROL) 18381 UNITS CAPS capsule Take 50,000 Units by mouth once a week       ferrous sulfate 325 (65 FE) MG tablet Take 325 mg by mouth daily (with breakfast)       FLUoxetine (PROZAC) 20 MG capsule Take 20 mg by mouth daily       hydrochlorothiazide (HYDRODIURIL) 12.5 MG tablet Take 12.5 mg by mouth daily       levocetirizine (XYZAL) 5 MG tablet Take 5 mg by mouth nightly       timolol (TIMOPTIC-XE) 0.5 % ophthalmic gel-forming Place 1 drop into both eyes nightly       Semaglutide, 1 MG/DOSE, 2 MG/1.5ML SOPN Inject into the skin once a week      ranitidine (ZANTAC) 150 MG tablet Take 150 mg by mouth nightly        No current facility-administered medications for this visit. Allergies:   Allergies as of 09/01/2020 - Review Complete 09/01/2020   Allergen Reaction Noted    Iodine  12/07/2016    Pcn [penicillins]  12/07/2016    Sulfa antibiotics  12/07/2016       Review of Systems:  Constitutional: negative for - chills and fever  Eyes:  negative for - visual disturbance and photophobia  HENMT: negative for - headaches and sinus pain  Respiratory: negative for - cough, hemoptysis, and shortness of breath  Cardiovascular: negative for - chest pain and palpitations  Gastrointestinal: negative for - blood in stools, constipation, diarrhea, nausea, and vomiting  Genito-Urinary: negative for - hematuria, urinary frequency, urinary urgency, and urinary retention  Musculoskeletal: negative for - joint pain, joint stiffness, and joint swelling  Hematological and Lymphatic: negative for - bleeding problems, abnormal bruising, and swollen lymph nodes  Endocrine:  negative for - polydipsia and polyphagia  Allergy/Immunology:  negative for - rhinorrhea, sinus congestion, hives  Integument:  negative for - negative for - rash, change in moles, new or changing lesions  Psychological: negative for - anxiety and depression  Neurological: negative for - memory loss, numbness/tingling, and weakness     PHYSICAL EXAMINATION:  Vitals:  /76   Pulse 60   Resp 18   Ht 6' 2\" (1.88 m)   Wt 290 lb (131.5 kg)   BMI 37.23 kg/m²   General appearance:  Alert, well developed, well nourished, in no distress  HEENT: treatment options of essential tremor with Richard Gell. He is a good candidate for DBS    Plan:   1. Continue CPAP use during sleep  2.  DBS at [address] [place] [address] as planned  3. FU in a year    Electronically signed by Escobar Gonsalez MD on 9/1/20

## 2021-02-01 ENCOUNTER — OFFICE VISIT (OUTPATIENT)
Dept: PULMONOLOGY | Facility: CLINIC | Age: 71
End: 2021-02-01

## 2021-02-01 VITALS
HEIGHT: 74 IN | HEART RATE: 61 BPM | BODY MASS INDEX: 35.94 KG/M2 | WEIGHT: 280 LBS | OXYGEN SATURATION: 98 % | TEMPERATURE: 96.8 F | DIASTOLIC BLOOD PRESSURE: 76 MMHG | SYSTOLIC BLOOD PRESSURE: 110 MMHG

## 2021-02-01 DIAGNOSIS — J44.9 STAGE 2 MODERATE COPD BY GOLD CLASSIFICATION (HCC): ICD-10-CM

## 2021-02-01 DIAGNOSIS — G47.33 OBSTRUCTIVE SLEEP APNEA SYNDROME: Primary | ICD-10-CM

## 2021-02-01 PROCEDURE — 99213 OFFICE O/P EST LOW 20 MIN: CPT | Performed by: INTERNAL MEDICINE

## 2021-02-01 RX ORDER — DAPAGLIFLOZIN 5 MG/1
TABLET, FILM COATED ORAL
COMMUNITY
Start: 2021-01-29

## 2021-02-01 RX ORDER — ISOSORBIDE MONONITRATE 30 MG/1
TABLET, EXTENDED RELEASE ORAL
COMMUNITY
Start: 2020-11-19

## 2021-02-01 RX ORDER — FAMOTIDINE 10 MG
10 TABLET ORAL 2 TIMES DAILY
COMMUNITY

## 2021-02-01 NOTE — PATIENT INSTRUCTIONS
Smoking Tobacco Information, Adult  Smoking tobacco can be harmful to your health. Tobacco contains a poisonous (toxic), colorless chemical called nicotine. Nicotine is addictive. It changes the brain and can make it hard to stop smoking. Tobacco also has other toxic chemicals that can hurt your body and raise your risk of many cancers.  How can smoking tobacco affect me?  Smoking tobacco puts you at risk for:  · Cancer. Smoking is most commonly associated with lung cancer, but can also lead to cancer in other parts of the body.  · Chronic obstructive pulmonary disease (COPD). This is a long-term lung condition that makes it hard to breathe. It also gets worse over time.  · High blood pressure (hypertension), heart disease, stroke, or heart attack.  · Lung infections, such as pneumonia.  · Cataracts. This is when the lenses in the eyes become clouded.  · Digestive problems. This may include peptic ulcers, heartburn, and gastroesophageal reflux disease (GERD).  · Oral health problems, such as gum disease and tooth loss.  · Loss of taste and smell.  Smoking can affect your appearance by causing:  · Wrinkles.  · Yellow or stained teeth, fingers, and fingernails.  Smoking tobacco can also affect your social life, because:  · It may be challenging to find places to smoke when away from home. Many workplaces, restaurants, hotels, and public places are tobacco-free.  · Smoking is expensive. This is due to the cost of tobacco and the long-term costs of treating health problems from smoking.  · Secondhand smoke may affect those around you. Secondhand smoke can cause lung cancer, breathing problems, and heart disease. Children of smokers have a higher risk for:  ? Sudden infant death syndrome (SIDS).  ? Ear infections.  ? Lung infections.  If you currently smoke tobacco, quitting now can help you:  · Lead a longer and healthier life.  · Look, smell, breathe, and feel better over time.  · Save money.  · Protect others from the  harms of secondhand smoke.  What actions can I take to prevent health problems?  Quit smoking    · Do not start smoking. Quit if you already do.  · Make a plan to quit smoking and commit to it. Look for programs to help you and ask your health care provider for recommendations and ideas.  · Set a date and write down all the reasons you want to quit.  · Let your friends and family know you are quitting so they can help and support you. Consider finding friends who also want to quit. It can be easier to quit with someone else, so that you can support each other.  · Talk with your health care provider about using nicotine replacement medicines to help you quit, such as gum, lozenges, patches, sprays, or pills.  · Do not replace cigarette smoking with electronic cigarettes, which are commonly called e-cigarettes. The safety of e-cigarettes is not known, and some may contain harmful chemicals.  · If you try to quit but return to smoking, stay positive. It is common to slip up when you first quit, so take it one day at a time.  · Be prepared for cravings. When you feel the urge to smoke, chew gum or suck on hard candy.  Lifestyle  · Stay busy and take care of your body.  · Drink enough fluid to keep your urine pale yellow.  · Get plenty of exercise and eat a healthy diet. This can help prevent weight gain after quitting.  · Monitor your eating habits. Quitting smoking can cause you to have a larger appetite than when you smoke.  · Find ways to relax. Go out with friends or family to a movie or a restaurant where people do not smoke.  · Ask your health care provider about having regular tests (screenings) to check for cancer. This may include blood tests, imaging tests, and other tests.  · Find ways to manage your stress, such as meditation, yoga, or exercise.  Where to find support  To get support to quit smoking, consider:  · Asking your health care provider for more information and resources.  · Taking classes to learn  more about quitting smoking.  · Looking for local organizations that offer resources about quitting smoking.  · Joining a support group for people who want to quit smoking in your local community.  · Calling the smokefree.gov counselor helpline: 1-800-Quit-Now (1-990.401.3271)  Where to find more information  You may find more information about quitting smoking from:  · HelpGuide.org: www.helpguide.org  · Smokefree.gov: smokefree.gov  · American Lung Association: www.lung.org  Contact a health care provider if you:  · Have problems breathing.  · Notice that your lips, nose, or fingers turn blue.  · Have chest pain.  · Are coughing up blood.  · Feel faint or you pass out.  · Have other health changes that cause you to worry.  Summary  · Smoking tobacco can negatively affect your health, the health of those around you, your finances, and your social life.  · Do not start smoking. Quit if you already do. If you need help quitting, ask your health care provider.  · Think about joining a support group for people who want to quit smoking in your local community. There are many effective programs that will help you to quit this behavior.  This information is not intended to replace advice given to you by your health care provider. Make sure you discuss any questions you have with your health care provider.  Document Revised: 09/11/2020 Document Reviewed: 01/02/2018  Elsevier Patient Education © 2020 [employer]

## 2021-03-05 ENCOUNTER — HOSPITAL ENCOUNTER (INPATIENT)
Age: 71
LOS: 1 days | Discharge: HOME | DRG: 303 | End: 2021-03-06
Attending: EMERGENCY MEDICINE | Admitting: FAMILY MEDICINE
Payer: MEDICARE

## 2021-03-05 ENCOUNTER — APPOINTMENT (OUTPATIENT)
Dept: GENERAL RADIOLOGY | Age: 71
DRG: 303 | End: 2021-03-05
Payer: MEDICARE

## 2021-03-05 DIAGNOSIS — R07.9 CHEST PAIN, UNSPECIFIED TYPE: Primary | ICD-10-CM

## 2021-03-05 LAB
ALBUMIN SERPL-MCNC: 4.1 G/DL (ref 3.5–5.2)
ALP BLD-CCNC: 69 U/L (ref 40–130)
ALT SERPL-CCNC: 15 U/L (ref 5–41)
ANION GAP SERPL CALCULATED.3IONS-SCNC: 13 MMOL/L (ref 7–19)
AST SERPL-CCNC: 13 U/L (ref 5–40)
BASOPHILS ABSOLUTE: 0 K/UL (ref 0–0.2)
BASOPHILS RELATIVE PERCENT: 0.5 % (ref 0–1)
BILIRUB SERPL-MCNC: 0.5 MG/DL (ref 0.2–1.2)
BUN BLDV-MCNC: 16 MG/DL (ref 8–23)
CALCIUM SERPL-MCNC: 8.9 MG/DL (ref 8.8–10.2)
CHLORIDE BLD-SCNC: 101 MMOL/L (ref 98–111)
CO2: 24 MMOL/L (ref 22–29)
CREAT SERPL-MCNC: 0.8 MG/DL (ref 0.5–1.2)
EOSINOPHILS ABSOLUTE: 0.3 K/UL (ref 0–0.6)
EOSINOPHILS RELATIVE PERCENT: 3.2 % (ref 0–5)
GFR AFRICAN AMERICAN: >59
GFR NON-AFRICAN AMERICAN: >60
GLUCOSE BLD-MCNC: 154 MG/DL (ref 74–109)
HCT VFR BLD CALC: 44.7 % (ref 42–52)
HEMOGLOBIN: 14.5 G/DL (ref 14–18)
IMMATURE GRANULOCYTES #: 0.1 K/UL
LIPASE: 27 U/L (ref 13–60)
LYMPHOCYTES ABSOLUTE: 1.8 K/UL (ref 1.1–4.5)
LYMPHOCYTES RELATIVE PERCENT: 21.8 % (ref 20–40)
MCH RBC QN AUTO: 28.9 PG (ref 27–31)
MCHC RBC AUTO-ENTMCNC: 32.4 G/DL (ref 33–37)
MCV RBC AUTO: 89.2 FL (ref 80–94)
MONOCYTES ABSOLUTE: 0.7 K/UL (ref 0–0.9)
MONOCYTES RELATIVE PERCENT: 8.4 % (ref 0–10)
NEUTROPHILS ABSOLUTE: 5.5 K/UL (ref 1.5–7.5)
NEUTROPHILS RELATIVE PERCENT: 65.5 % (ref 50–65)
PDW BLD-RTO: 14.3 % (ref 11.5–14.5)
PLATELET # BLD: 222 K/UL (ref 130–400)
PMV BLD AUTO: 9.6 FL (ref 9.4–12.4)
POTASSIUM REFLEX MAGNESIUM: 4 MMOL/L (ref 3.5–5)
PRO-BNP: 83 PG/ML (ref 0–900)
RBC # BLD: 5.01 M/UL (ref 4.7–6.1)
SARS-COV-2, NAAT: NOT DETECTED
SODIUM BLD-SCNC: 138 MMOL/L (ref 136–145)
TOTAL PROTEIN: 7 G/DL (ref 6.6–8.7)
TROPONIN: <0.01 NG/ML (ref 0–0.03)
WBC # BLD: 8.4 K/UL (ref 4.8–10.8)

## 2021-03-05 PROCEDURE — 94640 AIRWAY INHALATION TREATMENT: CPT

## 2021-03-05 PROCEDURE — 2580000003 HC RX 258: Performed by: FAMILY MEDICINE

## 2021-03-05 PROCEDURE — 84484 ASSAY OF TROPONIN QUANT: CPT

## 2021-03-05 PROCEDURE — 87635 SARS-COV-2 COVID-19 AMP PRB: CPT

## 2021-03-05 PROCEDURE — 80053 COMPREHEN METABOLIC PANEL: CPT

## 2021-03-05 PROCEDURE — 83690 ASSAY OF LIPASE: CPT

## 2021-03-05 PROCEDURE — 83880 ASSAY OF NATRIURETIC PEPTIDE: CPT

## 2021-03-05 PROCEDURE — 85025 COMPLETE CBC W/AUTO DIFF WBC: CPT

## 2021-03-05 PROCEDURE — 6360000002 HC RX W HCPCS: Performed by: FAMILY MEDICINE

## 2021-03-05 PROCEDURE — 6370000000 HC RX 637 (ALT 250 FOR IP): Performed by: FAMILY MEDICINE

## 2021-03-05 PROCEDURE — 2140000000 HC CCU INTERMEDIATE R&B

## 2021-03-05 PROCEDURE — 6370000000 HC RX 637 (ALT 250 FOR IP): Performed by: NURSE PRACTITIONER

## 2021-03-05 PROCEDURE — 36415 COLL VENOUS BLD VENIPUNCTURE: CPT

## 2021-03-05 PROCEDURE — 99284 EMERGENCY DEPT VISIT MOD MDM: CPT

## 2021-03-05 PROCEDURE — 96372 THER/PROPH/DIAG INJ SC/IM: CPT

## 2021-03-05 PROCEDURE — 93005 ELECTROCARDIOGRAM TRACING: CPT

## 2021-03-05 PROCEDURE — 71045 X-RAY EXAM CHEST 1 VIEW: CPT

## 2021-03-05 RX ORDER — FLUOXETINE HYDROCHLORIDE 20 MG/1
20 CAPSULE ORAL DAILY
Status: DISCONTINUED | OUTPATIENT
Start: 2021-03-05 | End: 2021-03-06 | Stop reason: HOSPADM

## 2021-03-05 RX ORDER — SODIUM CHLORIDE 0.9 % (FLUSH) 0.9 %
10 SYRINGE (ML) INJECTION PRN
Status: DISCONTINUED | OUTPATIENT
Start: 2021-03-05 | End: 2021-03-06 | Stop reason: HOSPADM

## 2021-03-05 RX ORDER — BUDESONIDE AND FORMOTEROL FUMARATE DIHYDRATE 160; 4.5 UG/1; UG/1
2 AEROSOL RESPIRATORY (INHALATION) 2 TIMES DAILY
Status: DISCONTINUED | OUTPATIENT
Start: 2021-03-05 | End: 2021-03-05

## 2021-03-05 RX ORDER — ACETAMINOPHEN 325 MG/1
650 TABLET ORAL EVERY 6 HOURS PRN
Status: DISCONTINUED | OUTPATIENT
Start: 2021-03-05 | End: 2021-03-06 | Stop reason: HOSPADM

## 2021-03-05 RX ORDER — ONDANSETRON 2 MG/ML
4 INJECTION INTRAMUSCULAR; INTRAVENOUS EVERY 6 HOURS PRN
Status: DISCONTINUED | OUTPATIENT
Start: 2021-03-05 | End: 2021-03-06 | Stop reason: HOSPADM

## 2021-03-05 RX ORDER — ASPIRIN 325 MG
325 TABLET ORAL ONCE
Status: COMPLETED | OUTPATIENT
Start: 2021-03-05 | End: 2021-03-05

## 2021-03-05 RX ORDER — ACETAMINOPHEN 325 MG/1
650 TABLET ORAL EVERY 4 HOURS PRN
Status: DISCONTINUED | OUTPATIENT
Start: 2021-03-05 | End: 2021-03-06 | Stop reason: HOSPADM

## 2021-03-05 RX ORDER — HYDROCHLOROTHIAZIDE 25 MG/1
12.5 TABLET ORAL DAILY
Status: DISCONTINUED | OUTPATIENT
Start: 2021-03-05 | End: 2021-03-06 | Stop reason: HOSPADM

## 2021-03-05 RX ORDER — PROMETHAZINE HYDROCHLORIDE 25 MG/1
12.5 TABLET ORAL EVERY 6 HOURS PRN
Status: DISCONTINUED | OUTPATIENT
Start: 2021-03-05 | End: 2021-03-06 | Stop reason: HOSPADM

## 2021-03-05 RX ORDER — ASPIRIN 81 MG/1
81 TABLET, CHEWABLE ORAL DAILY
Status: DISCONTINUED | OUTPATIENT
Start: 2021-03-05 | End: 2021-03-06 | Stop reason: HOSPADM

## 2021-03-05 RX ORDER — PROPRANOLOL HCL 60 MG
120 CAPSULE, EXTENDED RELEASE 24HR ORAL DAILY
Status: DISCONTINUED | OUTPATIENT
Start: 2021-03-05 | End: 2021-03-06 | Stop reason: HOSPADM

## 2021-03-05 RX ORDER — EPINEPHRINE 0.3 MG/.3ML
0.3 INJECTION SUBCUTANEOUS PRN
Status: ON HOLD | COMMUNITY
End: 2021-03-06 | Stop reason: HOSPADM

## 2021-03-05 RX ORDER — ACETAMINOPHEN 650 MG/1
650 SUPPOSITORY RECTAL EVERY 6 HOURS PRN
Status: DISCONTINUED | OUTPATIENT
Start: 2021-03-05 | End: 2021-03-06 | Stop reason: HOSPADM

## 2021-03-05 RX ORDER — PANTOPRAZOLE SODIUM 40 MG/1
40 TABLET, DELAYED RELEASE ORAL
Status: DISCONTINUED | OUTPATIENT
Start: 2021-03-06 | End: 2021-03-06 | Stop reason: HOSPADM

## 2021-03-05 RX ORDER — NITROGLYCERIN 0.4 MG/1
0.4 TABLET SUBLINGUAL EVERY 5 MIN PRN
Status: DISCONTINUED | OUTPATIENT
Start: 2021-03-05 | End: 2021-03-06 | Stop reason: HOSPADM

## 2021-03-05 RX ORDER — ARFORMOTEROL TARTRATE 15 UG/2ML
15 SOLUTION RESPIRATORY (INHALATION) 2 TIMES DAILY
Status: DISCONTINUED | OUTPATIENT
Start: 2021-03-05 | End: 2021-03-06 | Stop reason: HOSPADM

## 2021-03-05 RX ORDER — FERROUS SULFATE 325(65) MG
325 TABLET ORAL
Status: DISCONTINUED | OUTPATIENT
Start: 2021-03-06 | End: 2021-03-06 | Stop reason: HOSPADM

## 2021-03-05 RX ORDER — FLUTICASONE PROPIONATE 50 MCG
1 SPRAY, SUSPENSION (ML) NASAL DAILY
Status: DISCONTINUED | OUTPATIENT
Start: 2021-03-05 | End: 2021-03-06 | Stop reason: HOSPADM

## 2021-03-05 RX ORDER — SODIUM CHLORIDE 0.9 % (FLUSH) 0.9 %
10 SYRINGE (ML) INJECTION EVERY 12 HOURS SCHEDULED
Status: DISCONTINUED | OUTPATIENT
Start: 2021-03-05 | End: 2021-03-06 | Stop reason: HOSPADM

## 2021-03-05 RX ORDER — FAMOTIDINE 20 MG/1
20 TABLET, FILM COATED ORAL DAILY
Status: ON HOLD | COMMUNITY
Start: 2021-02-02 | End: 2021-03-06 | Stop reason: HOSPADM

## 2021-03-05 RX ORDER — BUDESONIDE 0.5 MG/2ML
0.5 INHALANT ORAL 2 TIMES DAILY
Status: DISCONTINUED | OUTPATIENT
Start: 2021-03-05 | End: 2021-03-06 | Stop reason: HOSPADM

## 2021-03-05 RX ORDER — PRAVASTATIN SODIUM 20 MG
40 TABLET ORAL DAILY
Status: DISCONTINUED | OUTPATIENT
Start: 2021-03-05 | End: 2021-03-06 | Stop reason: HOSPADM

## 2021-03-05 RX ORDER — POLYETHYLENE GLYCOL 3350 17 G/17G
17 POWDER, FOR SOLUTION ORAL DAILY PRN
Status: DISCONTINUED | OUTPATIENT
Start: 2021-03-05 | End: 2021-03-06 | Stop reason: HOSPADM

## 2021-03-05 RX ORDER — ISOSORBIDE MONONITRATE 30 MG/1
30 TABLET, EXTENDED RELEASE ORAL DAILY
Status: DISCONTINUED | OUTPATIENT
Start: 2021-03-05 | End: 2021-03-06 | Stop reason: HOSPADM

## 2021-03-05 RX ADMIN — HYDROCHLOROTHIAZIDE 12.5 MG: 25 TABLET ORAL at 21:45

## 2021-03-05 RX ADMIN — SODIUM CHLORIDE, PRESERVATIVE FREE 10 ML: 5 INJECTION INTRAVENOUS at 21:39

## 2021-03-05 RX ADMIN — PRAVASTATIN SODIUM 40 MG: 20 TABLET ORAL at 21:35

## 2021-03-05 RX ADMIN — NITROGLYCERIN 0.4 MG: 0.4 TABLET, ORALLY DISINTEGRATING SUBLINGUAL at 11:33

## 2021-03-05 RX ADMIN — ARFORMOTEROL TARTRATE 15 MCG: 15 SOLUTION RESPIRATORY (INHALATION) at 21:44

## 2021-03-05 RX ADMIN — BUDESONIDE 500 MCG: 0.5 SUSPENSION RESPIRATORY (INHALATION) at 21:44

## 2021-03-05 RX ADMIN — FLUTICASONE PROPIONATE 1 SPRAY: 50 SPRAY, METERED NASAL at 21:35

## 2021-03-05 RX ADMIN — ENOXAPARIN SODIUM 40 MG: 40 INJECTION SUBCUTANEOUS at 21:35

## 2021-03-05 RX ADMIN — SODIUM CHLORIDE, PRESERVATIVE FREE 10 ML: 5 INJECTION INTRAVENOUS at 21:36

## 2021-03-05 RX ADMIN — ASPIRIN 325 MG: 325 TABLET, FILM COATED ORAL at 11:33

## 2021-03-05 RX ADMIN — ISOSORBIDE MONONITRATE 30 MG: 30 TABLET, EXTENDED RELEASE ORAL at 21:36

## 2021-03-05 RX ADMIN — PROPRANOLOL HYDROCHLORIDE 120 MG: 60 CAPSULE, EXTENDED RELEASE ORAL at 21:34

## 2021-03-05 RX ADMIN — FLUOXETINE HYDROCHLORIDE 20 MG: 20 CAPSULE ORAL at 21:35

## 2021-03-05 ASSESSMENT — HEART SCORE: ECG: 1

## 2021-03-05 ASSESSMENT — ENCOUNTER SYMPTOMS
ABDOMINAL PAIN: 0
SHORTNESS OF BREATH: 1

## 2021-03-05 NOTE — CARE COORDINATION
Date / Time of Evaluation: 3/5/2021 4:04 PM  Assessment Completed by: Artie Villeda    Patient Admission Status: Inpatient [101]      Current PCP:  Maren Tellez MD  PCP verified? yes    Initial Assessment Completed at bedside with:  yes    Emergency Contacts:  Extended Emergency Contact Information  Primary Emergency Contact: Violeta Apple  Address: [address], [address] Phone: [phone]  Mobile Phone: [phone]  Relation: Spouse   needed? No    Advance Directives: Code Status:  Prior    Financial:  Payor: MEDICARE / Plan: MEDICARE PART A AND B / Product Type: *No Product type* /     Pre-Cert required for SNF:  no    Have Long Term Care Insurance:  no    Pharmacy:   [facility], [address] [place] [address]  Phone: [phone] Fax: [phone]      Potential assistance purchasing medications? no    ADLS:  Support System:   able to do everything. Spouse support if needed    Current Home Environment:  Lives with spouse  Steps:  na    Plans to RETURN to current housing: yes  Barriers to RETURNING to current housing:  no    Currently ACTIVE with Home Health CARE:  no  [address]:  no    DME Provider:  no    Has a pulse oximetry unit at home: no    Had [facility] prior to admission:  no  [address]:  no  Informed of need to bring portable home O2 tank to hospital on day of DISCHARGE:  no  Name of person committed to bringing portable tank at discharge:  no    Active with HD/PD prior to admission: no  Nephrologist:  no  HD Center:  no    Transition Plan:   return home to spouse    Transportation PLAN for Discharge:  Spouse    Factors facilitating achievement of predicted outcomes: none    Barriers to discharge:  none      Additional CM/SW Notes: Pt was a friendly. Stated he is feeling somewhat better. Denied any needs at [place].            Rima Dodson and/or his family were provided with choice of provider.         [facility]   Care Management Department  Ph:  [phone]  Fax: [phone]

## 2021-03-05 NOTE — ED PROVIDER NOTES
[facility] CARE  eMERGENCY dEPARTMENT eNCOUnter      Pt Name: Monica Jackson  MRN: [id number]  [place] 1950  Date of evaluation: 3/5/2021  Provider: [address]       Chief Complaint   Patient presents with    Chest Pain    Shortness of Breath         HISTORY OF PRESENT ILLNESS   (Location/Symptom, Timing/Onset,Context/Setting, Quality, Duration, Modifying Factors, Severity)  Note limiting factors. Henrry Palma a 72 y.o. male who presents to the emergency department for evaluation of chest pain and shortness of breath. Pt tells me that he has had midsternal chest pressure associated with shortness of breath that has been continuous over past 5-6 days. He tells me that he was out of Imdur medication having resumed this over past few days with some improvement in symptoms. He gives history of CAD having had last cardiac cath performed here January of 2020 followed by Dr. Sarah Yung. He gives history of COPD. He has history of HTN, Hyperlipidemia and diabetes. He denies fevers as well as abdominal pain. He has had no unilateral lower extremity pain or swelling. [facility] [place]    Nursing Notes were reviewed. REVIEW OF SYSTEMS    (2-9 systems for level 4, 10 or more for level 5)     Review of Systems   Constitutional: Negative for fever. Respiratory: Positive for shortness of breath. Cardiovascular: Positive for chest pain. Gastrointestinal: Negative for abdominal pain. All other systems reviewed and are negative. A complete review of systems was performed and is negative except as noted above in the HPI.        PAST MEDICAL HISTORY     Past Medical History:   Diagnosis Date    Allergic rhinitis     Clark esophagus     Chronic fatigue syndrome     COPD (chronic obstructive pulmonary disease) (HCC)     Coronary atherosclerosis due to calcified coronary lesion of native artery     Gastritis     GERD (gastroesophageal reflux disease)     Glucose intolerance (impaired glucose tolerance)     History of knee sprain     Hyperlipidemia     Hypertension     Kidney stones     Non-insulin dependent type 2 diabetes mellitus ([facility] [address]     Obesity     ELROY on CPAP     Tremor          SURGICAL HISTORY       Past Surgical History:   Procedure Laterality Date    BARIATRIC SURGERY  02/09/2005    CARDIAC CATHETERIZATION  1998    no blockages    COLONOSCOPY  05/22/2006    ELBOW SURGERY Right 08/21/2013    TOTAL KNEE ARTHROPLASTY Right 03/18/2013    UPPER GASTROINTESTINAL ENDOSCOPY  2002    UPPER GASTROINTESTINAL ENDOSCOPY  04/19/2012    UPPER GASTROINTESTINAL ENDOSCOPY           CURRENT MEDICATIONS       Current Discharge Medication List      CONTINUE these medications which have NOT CHANGED    Details   dapagliflozin (FARXIGA) 5 MG tablet Take 5 mg by mouth daily      Semaglutide, 1 MG/DOSE, 2 MG/1.5ML SOPN Inject into the skin once a week      propranolol (INDERAL LA) 120 MG extended release capsule Take 1 capsule by mouth daily  Qty: 90 capsule, Refills: 3      pravastatin (PRAVACHOL) 20 MG tablet TAKE 1 TABLET BY MOUTH DAILY  Qty: 90 tablet, Refills: 3      isosorbide mononitrate (IMDUR) 30 MG extended release tablet Take 1 tablet by mouth daily  Qty: 30 tablet, Refills: 5      budesonide-formoterol (SYMBICORT) 160-4.5 MCG/ACT AERO Inhale 2 puffs into the lungs 2 times daily      aspirin 81 MG tablet Take 81 mg by mouth daily      albuterol sulfate HFA (PROAIR HFA) 108 (90 BASE) MCG/ACT inhaler Inhale 2 puffs into the lungs every 6 hours as needed for Wheezing      fluticasone (FLONASE) 50 MCG/ACT nasal spray 1 spray by Nasal route daily      lansoprazole (PREVACID) 30 MG delayed release capsule Take 30 mg by mouth daily      vitamin D (ERGOCALCIFEROL) 81838 UNITS CAPS capsule Take 50,000 Units by mouth once a week       ferrous sulfate 325 (65 FE) MG tablet Take 325 mg by mouth daily (with breakfast)       FLUoxetine (PROZAC) 20 MG capsule Take 20 mg by mouth daily hydrochlorothiazide (HYDRODIURIL) 12.5 MG tablet Take 12.5 mg by mouth daily       levocetirizine (XYZAL) 5 MG tablet Take 5 mg by mouth nightly       timolol (TIMOPTIC-XE) 0.5 % ophthalmic gel-forming Place 1 drop into both eyes nightly       EPINEPHrine (EPIPEN) 0.3 MG/0.3ML SOAJ injection Inject 0.3 mg into the muscle as needed      famotidine (PEPCID) 20 MG tablet Take 20 mg by mouth daily      aclidinium (TUDORZA PRESSAIR) 400 MCG/ACT AEPB inhaler Inhale 1 puff into the lungs 2 times daily      azelastine HCl 0.15 % SOLN by Nasal route             ALLERGIES     Iodine, Shellfish allergy, Pcn [penicillins], and Sulfa antibiotics    FAMILY HISTORY       Family History   Problem Relation Age of Onset    Stroke Mother     Cancer Mother     Heart Attack Father     Heart Disease Father     Arthritis Sister     Cancer Brother         BLADDER    No Known Problems Maternal Grandmother     No Known Problems Maternal Grandfather     Heart Disease Paternal Grandfather     No Known Problems Sister           SOCIAL HISTORY       Social History     Socioeconomic History    Marital status:      Spouse name: None    Number of children: None    Years of education: None    Highest education level: None   Occupational History    None   Social Needs    Financial resource strain: None    Food insecurity     Worry: None     Inability: None    Transportation needs     Medical: None     Non-medical: None   Tobacco Use    Smoking status: Never Smoker    Smokeless tobacco: Never Used   Substance and Sexual Activity    Alcohol use:  Yes     Alcohol/week: 6.0 standard drinks     Types: 6 Shots of liquor per week     Frequency: Monthly or less     Drinks per session: 1 or 2     Binge frequency: Never    Drug use: No    Sexual activity: Yes   Lifestyle    Physical activity     Days per week: None     Minutes per session: None    Stress: None   Relationships    Social connections     Talks on phone: None Gets together: None     Attends Christian service: None     Active member of club or organization: None     Attends meetings of clubs or organizations: None     Relationship status: None    Intimate partner violence     Fear of current or ex partner: None     Emotionally abused: None     Physically abused: None     Forced sexual activity: None   Other Topics Concern    None   Social History Narrative    None       SCREENINGS    Stephanie Coma Scale  Eye Opening: Spontaneous  Best Verbal Response: Oriented  Best Motor Response: Obeys commands  Stephanie Coma Scale Score: 15 Heart Score for chest pain patients  History: Moderately Suspicious  ECG: Non-Specifc repolarization disturbance/LBTB/PM  Patient Age: > 65 years  *Risk factors for Atherosclerotic disease: Diabetes Mellitus, Hypercholesterolemia, Hypertension, Obesity, Coronary Artery Disease  Risk Factors: > 3 Risk factors or history of atherosclerotic disease*  Troponin: < 1X normal limit  Heart Score Total: 6      PHYSICAL EXAM    (up to 7 for level 4, 8 or more for level 5)     ED Triage Vitals [03/05/21 1055]   BP Temp Temp Source Pulse Resp SpO2 Height Weight   131/88 97 °F (36.1 °C) Oral 63 20 97 % 6' 2\" (1.88 m) 270 lb (122.5 kg)       Physical Exam  Vitals signs reviewed. HENT:      Head: Normocephalic. Right Ear: External ear normal.      Left Ear: External ear normal.   Eyes:      Conjunctiva/sclera: Conjunctivae normal.      Pupils: Pupils are equal, round, and reactive to light. Neck:      Musculoskeletal: Normal range of motion. Cardiovascular:      Rate and Rhythm: Normal rate and regular rhythm. Heart sounds: Normal heart sounds. Pulmonary:      Effort: Pulmonary effort is normal.      Breath sounds: Examination of the right-lower field reveals decreased breath sounds. Examination of the left-lower field reveals decreased breath sounds. Decreased breath sounds present.    Abdominal:      General: Bowel sounds are normal. Palpations: Abdomen is soft. Tenderness: There is no abdominal tenderness. Musculoskeletal: Normal range of motion. Comments: No calf muscle   No lower extremity edema   Skin:     General: Skin is warm and dry. Neurological:      Mental Status: He is alert and oriented to person, place, and time. DIAGNOSTIC RESULTS     EKG: All EKG's are interpreted by the Emergency Department Physician who either signs or Co-signs this chart in the absence of acardiologist.    There is a regular rate and rhythm. SR w/RBBB similar appearing ekg 1/28/19 Normal CO interval and normal P waves. Normal QRS interval. Normal QT interval. No obvious ST elevation or ST depression. Repeat ekg There is a regular rate and rhythm. SR w/RBBB Normal CO interval and normal P waves. Normal QRS interval. Normal QT interval. No obvious ST elevation or ST depression. RADIOLOGY:   Non-plain film images such as CT, Ultrasound andMRI are read by the radiologist. Plain radiographic images are visualized and preliminarily interpreted by the emergency physician with the below findings:        Interpretation per the Radiologist below, if available at the time of this note:    XR CHEST PORTABLE   Final Result   Impression: Shallow inspiration, no acute findings. Signed by Dr Lalita Mckenzie.  Adelso on 3/5/2021 12:43 PM      NM MYOCARDIAL SPECT REST EXERCISE OR RX    (Results Pending)         ED BEDSIDE ULTRASOUND:   Performed by ED Physician - none    LABS:  Labs Reviewed   CBC WITH AUTO DIFFERENTIAL - Abnormal; Notable for the following components:       Result Value    MCHC 32.4 (*)     Neutrophils % 65.5 (*)     All other components within normal limits   COMPREHENSIVE METABOLIC PANEL W/ REFLEX TO MG FOR LOW K - Abnormal; Notable for the following components:    Glucose 154 (*)     All other components within normal limits   COVID-19, RAPID   LIPASE   BRAIN NATRIURETIC PEPTIDE   TROPONIN   TROPONIN   TROPONIN   COMPREHENSIVE METABOLIC PANEL W/ REFLEX TO MG FOR LOW K   CBC WITH AUTO DIFFERENTIAL   TROPONIN       All other labs were within normal range or not returned as of this dictation. RE-ASSESSMENT     Discussed with Dr. Evangelist Hamilton who will consult asked to place order for [place] for tomorrow  Discussed with Dr. Sherry Obregon taking call for Dr. Lani Encarnacion who will admit patient. EMERGENCY DEPARTMENT COURSE and DIFFERENTIALDIAGNOSIS/MDM:   Vitals:    Vitals:    03/05/21 1433 03/05/21 1800 03/05/21 2021 03/05/21 2145   BP: 106/77 125/77     Pulse: 58 55 59    Resp:  16     Temp:  97 °F (36.1 °C)     TempSrc:  Temporal     SpO2: 95% 98%  95%   Weight:       Height:           MDM      CONSULTS:  IP CONSULT TO CARDIOLOGY    PROCEDURES:  Unless otherwise notedbelow, none     Procedures    FINAL IMPRESSION     1. Chest pain, unspecified type          DISPOSITION/PLAN   DISPOSITION Admitted 03/05/2021 03:59:33 PM      PATIENT REFERRED TO:  No follow-up provider specified.     DISCHARGE MEDICATIONS:       Current Discharge Medication List          (Pleasenote that portions of this note were completed with a voice recognition program.  Efforts were made to edit the dictations but occasionally words are mis-transcribed.)              Rc Edwards, APRN  03/05/21 1287

## 2021-03-05 NOTE — ED PROVIDER NOTES
Attending Supervisory Note/Shared Visit    I have reviewed the mid-levels findings and agree. Cp and SOB x 5-6 days. Known CAD last cath jan 2020. Has had some relief with Imdur. Trop neg. Enid Young and I discussed the case. He has d/w Dr. Kiana Ludwig who was in ED request [address] [place] [address] tomorrow as well as with Dr. Austin Espinosa for admission. FINAL IMPRESSION      1.  Chest pain, unspecified type          Jennifer Montague MD  Attending Emergency Physician        Isaac Lee MD  03/05/21 5821

## 2021-03-06 ENCOUNTER — APPOINTMENT (OUTPATIENT)
Dept: NUCLEAR MEDICINE | Age: 71
DRG: 303 | End: 2021-03-06
Payer: MEDICARE

## 2021-03-06 LAB
ALBUMIN SERPL-MCNC: 3.9 G/DL (ref 3.5–5.2)
ALP BLD-CCNC: 64 U/L (ref 40–130)
ALT SERPL-CCNC: 15 U/L (ref 5–41)
ANION GAP SERPL CALCULATED.3IONS-SCNC: 9 MMOL/L (ref 7–19)
AST SERPL-CCNC: 12 U/L (ref 5–40)
BASOPHILS ABSOLUTE: 0.1 K/UL (ref 0–0.2)
BASOPHILS RELATIVE PERCENT: 0.5 % (ref 0–1)
BILIRUB SERPL-MCNC: 0.4 MG/DL (ref 0.2–1.2)
BUN BLDV-MCNC: 18 MG/DL (ref 8–23)
CALCIUM SERPL-MCNC: 9 MG/DL (ref 8.8–10.2)
CHLORIDE BLD-SCNC: 102 MMOL/L (ref 98–111)
CO2: 30 MMOL/L (ref 22–29)
CREAT SERPL-MCNC: 0.9 MG/DL (ref 0.5–1.2)
EOSINOPHILS ABSOLUTE: 0.4 K/UL (ref 0–0.6)
EOSINOPHILS RELATIVE PERCENT: 3.6 % (ref 0–5)
GFR AFRICAN AMERICAN: >59
GFR NON-AFRICAN AMERICAN: >60
GLUCOSE BLD-MCNC: 98 MG/DL (ref 74–109)
HCT VFR BLD CALC: 42.2 % (ref 42–52)
HEMOGLOBIN: 13.3 G/DL (ref 14–18)
IMMATURE GRANULOCYTES #: 0 K/UL
LYMPHOCYTES ABSOLUTE: 3.1 K/UL (ref 1.1–4.5)
LYMPHOCYTES RELATIVE PERCENT: 31.6 % (ref 20–40)
MCH RBC QN AUTO: 28.7 PG (ref 27–31)
MCHC RBC AUTO-ENTMCNC: 31.5 G/DL (ref 33–37)
MCV RBC AUTO: 90.9 FL (ref 80–94)
MONOCYTES ABSOLUTE: 0.9 K/UL (ref 0–0.9)
MONOCYTES RELATIVE PERCENT: 9.2 % (ref 0–10)
NEUTROPHILS ABSOLUTE: 5.4 K/UL (ref 1.5–7.5)
NEUTROPHILS RELATIVE PERCENT: 54.7 % (ref 50–65)
PDW BLD-RTO: 14.4 % (ref 11.5–14.5)
PLATELET # BLD: 220 K/UL (ref 130–400)
PMV BLD AUTO: 9.5 FL (ref 9.4–12.4)
POTASSIUM REFLEX MAGNESIUM: 3.8 MMOL/L (ref 3.5–5)
RBC # BLD: 4.64 M/UL (ref 4.7–6.1)
SODIUM BLD-SCNC: 141 MMOL/L (ref 136–145)
TOTAL PROTEIN: 5.7 G/DL (ref 6.6–8.7)
TROPONIN: <0.01 NG/ML (ref 0–0.03)
WBC # BLD: 9.9 K/UL (ref 4.8–10.8)

## 2021-03-06 PROCEDURE — 36415 COLL VENOUS BLD VENIPUNCTURE: CPT

## 2021-03-06 PROCEDURE — 6360000002 HC RX W HCPCS: Performed by: FAMILY MEDICINE

## 2021-03-06 PROCEDURE — 80053 COMPREHEN METABOLIC PANEL: CPT

## 2021-03-06 PROCEDURE — 3430000000 HC RX DIAGNOSTIC RADIOPHARMACEUTICAL: Performed by: INTERNAL MEDICINE

## 2021-03-06 PROCEDURE — 85025 COMPLETE CBC W/AUTO DIFF WBC: CPT

## 2021-03-06 PROCEDURE — 6370000000 HC RX 637 (ALT 250 FOR IP): Performed by: FAMILY MEDICINE

## 2021-03-06 PROCEDURE — 93017 CV STRESS TEST TRACING ONLY: CPT

## 2021-03-06 PROCEDURE — 94640 AIRWAY INHALATION TREATMENT: CPT

## 2021-03-06 PROCEDURE — 6360000002 HC RX W HCPCS: Performed by: INTERNAL MEDICINE

## 2021-03-06 PROCEDURE — 84484 ASSAY OF TROPONIN QUANT: CPT

## 2021-03-06 PROCEDURE — A9500 TC99M SESTAMIBI: HCPCS | Performed by: INTERNAL MEDICINE

## 2021-03-06 RX ORDER — NITROGLYCERIN 0.4 MG/1
TABLET SUBLINGUAL
Qty: 25 TABLET | Refills: 3 | Status: SHIPPED | OUTPATIENT
Start: 2021-03-06

## 2021-03-06 RX ADMIN — ARFORMOTEROL TARTRATE 15 MCG: 15 SOLUTION RESPIRATORY (INHALATION) at 08:15

## 2021-03-06 RX ADMIN — REGADENOSON 0.4 MG: 0.08 INJECTION, SOLUTION INTRAVENOUS at 09:00

## 2021-03-06 RX ADMIN — TETRAKIS(2-METHOXYISOBUTYLISOCYANIDE)COPPER(I) TETRAFLUOROBORATE 10 MILLICURIE: 1 INJECTION, POWDER, LYOPHILIZED, FOR SOLUTION INTRAVENOUS at 09:57

## 2021-03-06 RX ADMIN — PANTOPRAZOLE SODIUM 40 MG: 40 TABLET, DELAYED RELEASE ORAL at 06:18

## 2021-03-06 RX ADMIN — BUDESONIDE 500 MCG: 0.5 SUSPENSION RESPIRATORY (INHALATION) at 08:15

## 2021-03-06 RX ADMIN — TETRAKIS(2-METHOXYISOBUTYLISOCYANIDE)COPPER(I) TETRAFLUOROBORATE 30 MILLICURIE: 1 INJECTION, POWDER, LYOPHILIZED, FOR SOLUTION INTRAVENOUS at 09:57

## 2021-03-06 ASSESSMENT — ENCOUNTER SYMPTOMS
EYES NEGATIVE: 1
COUGH: 0
ALLERGIC/IMMUNOLOGIC NEGATIVE: 1
SHORTNESS OF BREATH: 1
GASTROINTESTINAL NEGATIVE: 1
APNEA: 0
CHEST TIGHTNESS: 1

## 2021-03-06 NOTE — DISCHARGE SUMMARY
mouth daily             levocetirizine (XYZAL) 5 MG tablet  Take 5 mg by mouth nightly              nitroGLYCERIN (NITROSTAT) 0.4 MG SL tablet  up to max of 3 total doses. If no relief after 1 dose, call 911. pravastatin (PRAVACHOL) 20 MG tablet  TAKE 1 TABLET BY MOUTH DAILY             propranolol (INDERAL LA) 120 MG extended release capsule  Take 1 capsule by mouth daily             Semaglutide, 1 MG/DOSE, 2 MG/1.5ML SOPN  Inject into the skin once a week             timolol (TIMOPTIC-XE) 0.5 % ophthalmic gel-forming  Place 1 drop into both eyes nightly              vitamin D (ERGOCALCIFEROL) 07502 UNITS CAPS capsule  Take 50,000 Units by mouth once a week                  Consults: CARDS    Significant Diagnostic Studies:    See summary of labs/x-rays/path report for further details  NEG LEXISCAN      Disposition:   home  Follow up with Emmy Erwin MD in 1  weeks.     SignedKreg Masters   3/6/2021, 5:08 PM

## 2021-03-06 NOTE — H&P
[facility]  History and Physical    Patient:  Nba Childress  MRN: [id number]    CHIEF COMPLAINT: CP/SOA    History Obtained From:  patient, electronic medical record  PCP: Anne Galicia MD    ED HISTORY OF PRESENT ILLNESS:   Sanjana Lira a 72 y.o. male who presents to the emergency department for evaluation of chest pain and shortness of breath. Pt tells me that he has had midsternal chest pressure associated with shortness of breath that has been continuous over past 5-6 days. He tells me that he was out of Imdur medication having resumed this over past few days with some improvement in symptoms. He gives history of CAD having had last cardiac cath performed here January of 2020 followed by Dr. Arnaud Torre. He gives history of COPD. He has history of HTN, Hyperlipidemia and diabetes. He denies fevers as well as abdominal pain. He has had no unilateral lower extremity pain or swelling. REVIEW OF SYSTEMS:  Review of Systems     Review of Systems   Constitutional: Negative for fever. Respiratory: Positive for shortness of breath. Cardiovascular: Positive for chest pain. Gastrointestinal: Negative for abdominal pain.    All other systems reviewed and are negative.     Past Medical History:      Diagnosis Date    Allergic rhinitis     Clark esophagus     Chronic fatigue syndrome     COPD (chronic obstructive pulmonary disease) (HCC)     Coronary atherosclerosis due to calcified coronary lesion of native artery     Gastritis     GERD (gastroesophageal reflux disease)     Glucose intolerance (impaired glucose tolerance)     History of knee sprain     Hyperlipidemia     Hypertension     Kidney stones     Non-insulin dependent type 2 diabetes mellitus ([address]     Obesity     ELROY on CPAP     Tremor        Past Surgical History:      Procedure Laterality Date    BARIATRIC SURGERY  02/09/2005    CARDIAC CATHETERIZATION  1998    no blockages    COLONOSCOPY  05/22/2006    ELBOW SURGERY Right 08/21/2013    TOTAL KNEE ARTHROPLASTY Right 03/18/2013    UPPER GASTROINTESTINAL ENDOSCOPY  2002    UPPER GASTROINTESTINAL ENDOSCOPY  04/19/2012    UPPER GASTROINTESTINAL ENDOSCOPY         Medications Prior to Admission:    Prior to Admission medications    Medication Sig Start Date End Date Taking?  Authorizing Provider   dapagliflozin (FARXIGA) 5 MG tablet Take 5 mg by mouth daily 1/18/21  Yes Historical Provider, MD   Semaglutide, 1 MG/DOSE, 2 MG/1.5ML SOPN Inject into the skin once a week   Yes Historical Provider, MD   propranolol (INDERAL LA) 120 MG extended release capsule Take 1 capsule by mouth daily 8/13/20  Yes CHRISTOPHER Winter   pravastatin (PRAVACHOL) 20 MG tablet TAKE 1 TABLET BY MOUTH DAILY 2/12/20  Yes CHRISTOPHER Winter   isosorbide mononitrate (IMDUR) 30 MG extended release tablet Take 1 tablet by mouth daily 1/4/20  Yes Scarlett Yanez MD   budesonide-formoterol (SYMBICORT) 160-4.5 MCG/ACT AERO Inhale 2 puffs into the lungs 2 times daily   Yes Historical Provider, MD   aspirin 81 MG tablet Take 81 mg by mouth daily   Yes Historical Provider, MD   albuterol sulfate HFA (PROAIR HFA) 108 (90 BASE) MCG/ACT inhaler Inhale 2 puffs into the lungs every 6 hours as needed for Wheezing   Yes Historical Provider, MD   fluticasone (FLONASE) 50 MCG/ACT nasal spray 1 spray by Nasal route daily   Yes Historical Provider, MD   lansoprazole (PREVACID) 30 MG delayed release capsule Take 30 mg by mouth daily   Yes Historical Provider, MD   vitamin D (ERGOCALCIFEROL) 33936 UNITS CAPS capsule Take 50,000 Units by mouth once a week  12/1/16  Yes Historical Provider, MD   ferrous sulfate 325 (65 FE) MG tablet Take 325 mg by mouth daily (with breakfast)  10/4/16  Yes Historical Provider, MD   FLUoxetine (PROZAC) 20 MG capsule Take 20 mg by mouth daily  12/1/16  Yes Historical Provider, MD   hydrochlorothiazide (HYDRODIURIL) 12.5 MG tablet Take 12.5 mg by mouth daily  9/28/16  Yes Historical Provider, MD levocetirizine (XYZAL) 5 MG tablet Take 5 mg by mouth nightly  11/14/16  Yes Historical Provider, MD   timolol (TIMOPTIC-XE) 0.5 % ophthalmic gel-forming Place 1 drop into both eyes nightly  9/1/16  Yes Historical Provider, MD   EPINEPHrine (EPIPEN) 0.3 MG/0.3ML SOAJ injection Inject 0.3 mg into the muscle as needed    Historical Provider, MD   famotidine (PEPCID) 20 MG tablet Take 20 mg by mouth daily 2/2/21   Historical Provider, MD   aclidinium (TUDORZA PRESSAIR) 400 MCG/ACT AEPB inhaler Inhale 1 puff into the lungs 2 times daily    Historical Provider, MD   azelastine HCl 0.15 % SOLN by Nasal route    Historical Provider, MD       Allergies:  Iodine, Shellfish allergy, Pcn [penicillins], and Sulfa antibiotics    Social History:   Social History     Socioeconomic History    Marital status:      Spouse name: Not on file    Number of children: Not on file    Years of education: Not on file    Highest education level: Not on file   Occupational History    Not on file   Social Needs    Financial resource strain: Not on file    Food insecurity     Worry: Not on file     Inability: Not on file    Transportation needs     Medical: Not on file     Non-medical: Not on file   Tobacco Use    Smoking status: Never Smoker    Smokeless tobacco: Never Used   Substance and Sexual Activity    Alcohol use:  Yes     Alcohol/week: 6.0 standard drinks     Types: 6 Shots of liquor per week     Frequency: Monthly or less     Drinks per session: 1 or 2     Binge frequency: Never    Drug use: No    Sexual activity: Yes   Lifestyle    Physical activity     Days per week: Not on file     Minutes per session: Not on file    Stress: Not on file   Relationships    Social connections     Talks on phone: Not on file     Gets together: Not on file     Attends Zoroastrian service: Not on file     Active member of club or organization: Not on file     Attends meetings of clubs or organizations: Not on file     Relationship 03/06/21 0223   WBC 8.4 9.9   HGB 14.5 13.3*    220     BMP:    Recent Labs     03/05/21  1113 03/06/21  0223    141   K 4.0 3.8    102   CO2 24 30*   BUN 16 18   CREATININE 0.8 0.9   GLUCOSE 154* 98     Hepatic:   Recent Labs     03/05/21  1113 03/06/21  0223   AST 13 12   ALT 15 15   BILITOT 0.5 0.4   ALKPHOS 69 64     Troponin T:   Recent Labs     03/05/21  1332 03/05/21 2001 03/06/21  0717   TROPONINI <0.01 <0.01 <0.01     Pro-BNP: No results for input(s): BNP in the last 72 hours. Lipids: No results for input(s): CHOL, HDL in the last 72 hours. Invalid input(s): LDLCALCU  ABGs: No results found for: PHART, PO2ART, KFR1BAF  INR: No results for input(s): INR in the last 72 hours. -----------------------------------------------------------------  EKG:There is a regular rate and rhythm. SR w/RBBB similar appearing ekg 1/28/19 Normal MD interval and normal P waves. Normal QRS interval. Normal QT interval. No obvious ST elevation or ST depression. Radiology:     Xr Chest Portable    Result Date: 3/5/2021  EXAMINATION: XR CHEST PORTABLE 3/5/2021 12:42 PM HISTORY: Chest pain. Report: A portable upright frontal view the chest was obtained. COMPARISON: Chest x-ray 12/17/2015. The lungs are mildly hypoaerated, no infiltrate is seen. Heart size is normal. No pneumothorax or effusion is identified. The bony thorax and upper abdomen are unremarkable. Impression: Shallow inspiration, no acute findings. Signed by Dr Idania Lin. Adelso on 3/5/2021 12:43 PM      Assessment and Plan   1. Active Problems:    Chest pain  Resolved Problems:    * No resolved hospital problems.  *    PLAN:  Medically stable     Per CARDS :    Sera Valero today  Pt on imdur  Pt may need repeat cath basd on lou  Fu dr Stu Salmeron as OP   Old cath films reviewed    COPD:  Cont on current inhalers     Tomer Garcia PA-C  3/6/2021

## 2021-03-06 NOTE — PROGRESS NOTES
Renata Amaya arrived to room # 378. Presented with: Chest Pain  Mental Status: Patient is oriented, alert, logical, thought processes intact and able to concentrate and follow conversation. Vitals:    03/05/21 1433   BP: 106/77   Pulse: 58   Resp:    Temp:    SpO2: 95%     Patient safety contract and falls prevention contract reviewed with patient Yes. Oriented Patient and Family to room. Call light within reach. Yes.   Needs, issues or concerns expressed at this time: no.      Electronically signed by Tashi Burnette RN on 3/5/2021 at 6:10 PM

## 2021-03-06 NOTE — PROGRESS NOTES
Okay to discharge per Dr. Dada Navarro from cardiology standpoint, called placed to Dr. Tawana White office, notified JASMYN Ramirez with Dr. Shereen Burleson.

## 2021-03-06 NOTE — CONSULTS
[id number] [facility]  Cardiology Consult      Requesting MD:  Kathie Connolly MD   Admit Status:  Inpatient [101]       History obtained from:   [] Patient  [] Other (specify):     Patient:  Carol Lamb  [id number]     Chief Complaint:   Chief Complaint   Patient presents with    Chest Pain    Shortness of Breath         HPI: Mr. Vanessa Wang is a 72 y.o. male with a history of Lad stenosis by cath 01/2020 br dr Issac Lugo, IFR LAD 0.92 then, was on imdur. Also has DM  C/o chest pressure. Negative troponins. Pt had missed 2 doses of imdur. Has tightness at rest    Review of Systems:  Review of Systems   Constitutional: Negative. HENT: Negative. Eyes: Negative. Respiratory: Positive for chest tightness. Cardiovascular: Positive for chest pain. Gastrointestinal: Negative. Endocrine: Negative. Genitourinary: Negative. Musculoskeletal: Negative. Allergic/Immunologic: Negative. Neurological: Negative. Hematological: Negative. Psychiatric/Behavioral: Negative.         Cardiac Specific Data:  Specialty Problems        Cardiology Problems    Syncope        Coronary atherosclerosis due to calcified coronary lesion of native artery        Hypertension        Biatrial enlargement        Right ventricular enlargement        [facility])        Ventricular tachyarrhythmia (HCC)        Chest pain              Past Medical History:  Past Medical History:   Diagnosis Date    Allergic rhinitis     Clark esophagus     Chronic fatigue syndrome     COPD (chronic obstructive pulmonary disease) (HCC)     Coronary atherosclerosis due to calcified coronary lesion of native artery     Gastritis     GERD (gastroesophageal reflux disease)     Glucose intolerance (impaired glucose tolerance)     History of knee sprain     Hyperlipidemia     Hypertension     Kidney stones     Non-insulin dependent type 2 diabetes mellitus (HCC)     Obesity     ELROY on CPAP     Tremor Past Surgical History:  Past Surgical History:   Procedure Laterality Date    BARIATRIC SURGERY  02/09/2005    CARDIAC CATHETERIZATION  1998    no blockages    COLONOSCOPY  05/22/2006    ELBOW SURGERY Right 08/21/2013    TOTAL KNEE ARTHROPLASTY Right 03/18/2013    UPPER GASTROINTESTINAL ENDOSCOPY  2002    UPPER GASTROINTESTINAL ENDOSCOPY  04/19/2012    UPPER GASTROINTESTINAL ENDOSCOPY         Past Family History:  Family History   Problem Relation Age of Onset    Stroke Mother     Cancer Mother     Heart Attack Father     Heart Disease Father     Arthritis Sister     Cancer Brother         BLADDER    No Known Problems Maternal Grandmother     No Known Problems Maternal Grandfather     Heart Disease Paternal Grandfather     No Known Problems Sister        Past Social History:  Social History     Socioeconomic History    Marital status:      Spouse name: Not on file    Number of children: Not on file    Years of education: Not on file    Highest education level: Not on file   Occupational History    Not on file   Social Needs    Financial resource strain: Not on file    Food insecurity     Worry: Not on file     Inability: Not on file    Transportation needs     Medical: Not on file     Non-medical: Not on file   Tobacco Use    Smoking status: Never Smoker    Smokeless tobacco: Never Used   Substance and Sexual Activity    Alcohol use:  Yes     Alcohol/week: 6.0 standard drinks     Types: 6 Shots of liquor per week     Frequency: Monthly or less     Drinks per session: 1 or 2     Binge frequency: Never    Drug use: No    Sexual activity: Yes   Lifestyle    Physical activity     Days per week: Not on file     Minutes per session: Not on file    Stress: Not on file   Relationships    Social connections     Talks on phone: Not on file     Gets together: Not on file     Attends Restoration service: Not on file     Active member of club or organization: Not on file     Attends meetings of clubs or organizations: Not on file     Relationship status: Not on file    Intimate partner violence     Fear of current or ex partner: Not on file     Emotionally abused: Not on file     Physically abused: Not on file     Forced sexual activity: Not on file   Other Topics Concern    Not on file   Social History Narrative    Not on file       Allergies: Allergies   Allergen Reactions    Iodine Anaphylaxis    Shellfish Allergy Swelling    Pcn [Penicillins]     Sulfa Antibiotics        Home Meds:  Prior to Admission medications    Medication Sig Start Date End Date Taking?  Authorizing Provider   dapagliflozin (FARXIGA) 5 MG tablet Take 5 mg by mouth daily 1/18/21  Yes Historical Provider, MD   Semaglutide, 1 MG/DOSE, 2 MG/1.5ML SOPN Inject into the skin once a week   Yes Historical Provider, MD   propranolol (INDERAL LA) 120 MG extended release capsule Take 1 capsule by mouth daily 8/13/20  Yes CHRISTOPHER Johnson   pravastatin (PRAVACHOL) 20 MG tablet TAKE 1 TABLET BY MOUTH DAILY 2/12/20  Yes CHRISTOPHER Johnson   isosorbide mononitrate (IMDUR) 30 MG extended release tablet Take 1 tablet by mouth daily 1/4/20  Yes Teresa Obrien MD   budesonide-formoterol (SYMBICORT) 160-4.5 MCG/ACT AERO Inhale 2 puffs into the lungs 2 times daily   Yes Historical Provider, MD   aspirin 81 MG tablet Take 81 mg by mouth daily   Yes Historical Provider, MD   albuterol sulfate HFA (PROAIR HFA) 108 (90 BASE) MCG/ACT inhaler Inhale 2 puffs into the lungs every 6 hours as needed for Wheezing   Yes Historical Provider, MD   fluticasone (FLONASE) 50 MCG/ACT nasal spray 1 spray by Nasal route daily   Yes Historical Provider, MD   lansoprazole (PREVACID) 30 MG delayed release capsule Take 30 mg by mouth daily   Yes Historical Provider, MD   vitamin D (ERGOCALCIFEROL) 02598 UNITS CAPS capsule Take 50,000 Units by mouth once a week  12/1/16  Yes Historical Provider, MD   ferrous sulfate 325 (65 FE) MG tablet Take 325 mg by mouth daily (with breakfast)  10/4/16  Yes Historical Provider, MD   FLUoxetine (PROZAC) 20 MG capsule Take 20 mg by mouth daily  12/1/16  Yes Historical Provider, MD   hydrochlorothiazide (HYDRODIURIL) 12.5 MG tablet Take 12.5 mg by mouth daily  9/28/16  Yes Historical Provider, MD   levocetirizine (XYZAL) 5 MG tablet Take 5 mg by mouth nightly  11/14/16  Yes Historical Provider, MD   timolol (TIMOPTIC-XE) 0.5 % ophthalmic gel-forming Place 1 drop into both eyes nightly  9/1/16  Yes Historical Provider, MD   EPINEPHrine (EPIPEN) 0.3 MG/0.3ML SOAJ injection Inject 0.3 mg into the muscle as needed    Historical Provider, MD   famotidine (PEPCID) 20 MG tablet Take 20 mg by mouth daily 2/2/21   Historical Provider, MD   aclidinium (TUDORZA PRESSAIR) 400 MCG/ACT AEPB inhaler Inhale 1 puff into the lungs 2 times daily    Historical Provider, MD   azelastine HCl 0.15 % SOLN by Nasal route    Historical Provider, MD       Current Meds:   sodium chloride flush  10 mL Intravenous 2 times per day    enoxaparin  40 mg Subcutaneous Q24H    aspirin  81 mg Oral Daily    ferrous sulfate  325 mg Oral Daily with breakfast    FLUoxetine  20 mg Oral Daily    fluticasone  1 spray Nasal Daily    hydroCHLOROthiazide  12.5 mg Oral Daily    isosorbide mononitrate  30 mg Oral Daily    pantoprazole  40 mg Oral QAM AC    pravastatin  40 mg Oral Daily    propranolol  120 mg Oral Daily    sodium chloride flush  10 mL Intravenous 2 times per day    budesonide  0.5 mg Nebulization BID    And    Arformoterol Tartrate  15 mcg Nebulization BID       Current Infused Meds:      Physical Exam:  Vitals:    03/06/21 0655   BP: 92/60   Pulse: 55   Resp: 16   Temp: 97.1 °F (36.2 °C)   SpO2: 91%       Intake/Output Summary (Last 24 hours) at 3/6/2021 0910  Last data filed at 3/5/2021 2139  Gross per 24 hour   Intake 20 ml   Output --   Net 20 ml     Estimated body mass index is 34.67 kg/m² as calculated from the following:    Height as of this encounter: 6' 2\" (1.88 m). Weight as of this encounter: 270 lb (122.5 kg). Physical Exam  Vitals signs reviewed. Constitutional:       Appearance: Normal appearance. HENT:      Head: Normocephalic. Right Ear: Tympanic membrane normal.      Nose: Nose normal.      Mouth/Throat:      Mouth: Mucous membranes are moist.   Eyes:      Pupils: Pupils are equal, round, and reactive to light. Neck:      Musculoskeletal: Normal range of motion and neck supple. Cardiovascular:      Rate and Rhythm: Normal rate and regular rhythm. Pulses: Normal pulses. Heart sounds: Normal heart sounds. Pulmonary:      Effort: Pulmonary effort is normal.   Abdominal:      General: Abdomen is flat. Musculoskeletal: Normal range of motion. Skin:     General: Skin is warm. Neurological:      General: No focal deficit present. Mental Status: He is alert. Psychiatric:         Mood and Affect: Mood normal.         Labs:  Recent Labs     03/05/21  1113 03/06/21  0223   WBC 8.4 9.9   HGB 14.5 13.3*    220       Recent Labs     03/05/21  1113 03/06/21  0223    141   K 4.0 3.8    102   CO2 24 30*   BUN 16 18   CREATININE 0.8 0.9   LABGLOM >60 >60   CALCIUM 8.9 9.0       CK, CKMB, Troponin: @LABRCNT (CKTOTAL:3, CKMB:3, TROPONINI:3)@    Last 3 BNP:  No results for input(s): BNP in the last 72 hours. IMAGING:  Xr Chest Portable    Result Date: 3/5/2021  EXAMINATION: XR CHEST PORTABLE 3/5/2021 12:42 PM HISTORY: Chest pain. Report: A portable upright frontal view the chest was obtained. COMPARISON: Chest x-ray 12/17/2015. The lungs are mildly hypoaerated, no infiltrate is seen. Heart size is normal. No pneumothorax or effusion is identified. The bony thorax and upper abdomen are unremarkable. Impression: Shallow inspiration, no acute findings. Signed by Dr Priscilla Darden on 3/5/2021 12:43 PM      Assessment:  1. Unstable angina  2.  LAD 60% stenosis 01/2020 by  zhang  3.  HTN  4. DM  5. obesity      Recommendations:    lexiscan today  Pt on imdur  Pt may need repeat cath basd on lou  Fu dr Bel Arriola as OP    Old cath films reviewed      Risk factor control skin normal color for race, warm, dry and intact.

## 2021-03-06 NOTE — PROGRESS NOTES
Pt requests to be discharge, 2nd call placed to Dr. Chacon office.   JASMYN Beyer called back, will refer the information to NIYAH Kidd PA-C.

## 2021-03-06 NOTE — H&P
History and Physical    Patient:  Adarsh Isbell  MRN: [id number]    CHIEF COMPLAINT:  CHEST PAIN AND SOB      PCP: Jose Miguel Fry MD    HISTORY OF PRESENT ILLNESS:   Torrie Jarvis a 72 y.o. male who presents to the emergency department for evaluation of chest pain and shortness of breath. He has had midsternal chest pressure associated with shortness of breath that has been continuous over past 5-6 days. He was out of Imdur medication having resumed this over past few days with some improvement in symptoms. He gives history of CAD having had last cardiac cath performed here January of 2020 followed by Dr. Leda Cespedes. He gives history of COPD. He has history of HTN, Hyperlipidemia and diabetes. He denies fevers as well as abdominal pain. He has had no unilateral lower extremity pain or swelling. Past Medical History:      Diagnosis Date    Allergic rhinitis     Clark esophagus     Chronic fatigue syndrome     COPD (chronic obstructive pulmonary disease) (HCC)     Coronary atherosclerosis due to calcified coronary lesion of native artery     Gastritis     GERD (gastroesophageal reflux disease)     Glucose intolerance (impaired glucose tolerance)     History of knee sprain     Hyperlipidemia     Hypertension     Kidney stones     Non-insulin dependent type 2 diabetes mellitus ([address]     Obesity     ELROY on CPAP     Tremor        Past Surgical History:      Procedure Laterality Date    BARIATRIC SURGERY  02/09/2005    CARDIAC CATHETERIZATION  1998    no blockages    COLONOSCOPY  05/22/2006    ELBOW SURGERY Right 08/21/2013    TOTAL KNEE ARTHROPLASTY Right 03/18/2013    UPPER GASTROINTESTINAL ENDOSCOPY  2002    UPPER GASTROINTESTINAL ENDOSCOPY  04/19/2012    UPPER GASTROINTESTINAL ENDOSCOPY         Medications Prior to Admission:    Prior to Admission medications    Medication Sig Start Date End Date Taking?  Authorizing Provider   dapagliflozin (FARXIGA) 5 MG tablet Take 5 mg by mouth daily 1/18/21  Yes Historical Provider, MD   Semaglutide, 1 MG/DOSE, 2 MG/1.5ML SOPN Inject into the skin once a week   Yes Historical Provider, MD   propranolol (INDERAL LA) 120 MG extended release capsule Take 1 capsule by mouth daily 8/13/20  Yes CHRISTOPHER Chun   pravastatin (PRAVACHOL) 20 MG tablet TAKE 1 TABLET BY MOUTH DAILY 2/12/20  Yes CHRISTOPHER Chun   isosorbide mononitrate (IMDUR) 30 MG extended release tablet Take 1 tablet by mouth daily 1/4/20  Yes Merrill Tolentino MD   budesonide-formoterol (SYMBICORT) 160-4.5 MCG/ACT AERO Inhale 2 puffs into the lungs 2 times daily   Yes Historical Provider, MD   aspirin 81 MG tablet Take 81 mg by mouth daily   Yes Historical Provider, MD   albuterol sulfate HFA (PROAIR HFA) 108 (90 BASE) MCG/ACT inhaler Inhale 2 puffs into the lungs every 6 hours as needed for Wheezing   Yes Historical Provider, MD   fluticasone (FLONASE) 50 MCG/ACT nasal spray 1 spray by Nasal route daily   Yes Historical Provider, MD   lansoprazole (PREVACID) 30 MG delayed release capsule Take 30 mg by mouth daily   Yes Historical Provider, MD   vitamin D (ERGOCALCIFEROL) 87589 UNITS CAPS capsule Take 50,000 Units by mouth once a week  12/1/16  Yes Historical Provider, MD   ferrous sulfate 325 (65 FE) MG tablet Take 325 mg by mouth daily (with breakfast)  10/4/16  Yes Historical Provider, MD   FLUoxetine (PROZAC) 20 MG capsule Take 20 mg by mouth daily  12/1/16  Yes Historical Provider, MD   hydrochlorothiazide (HYDRODIURIL) 12.5 MG tablet Take 12.5 mg by mouth daily  9/28/16  Yes Historical Provider, MD   levocetirizine (XYZAL) 5 MG tablet Take 5 mg by mouth nightly  11/14/16  Yes Historical Provider, MD   timolol (TIMOPTIC-XE) 0.5 % ophthalmic gel-forming Place 1 drop into both eyes nightly  9/1/16  Yes Historical Provider, MD   EPINEPHrine (EPIPEN) 0.3 MG/0.3ML SOAJ injection Inject 0.3 mg into the muscle as needed    Historical Provider, MD   famotidine (PEPCID) 20 MG tablet Take 20 mg by mouth daily 2/2/21   Historical Provider, MD   aclidinium (TUDORZA PRESSAIR) 400 MCG/ACT AEPB inhaler Inhale 1 puff into the lungs 2 times daily    Historical Provider, MD   azelastine HCl 0.15 % SOLN by Nasal route    Historical Provider, MD       Allergies:  Iodine, Shellfish allergy, Pcn [penicillins], and Sulfa antibiotics    Social History:   Social History     Socioeconomic History    Marital status:      Spouse name: Not on file    Number of children: Not on file    Years of education: Not on file    Highest education level: Not on file   Occupational History    Not on file   Social Needs    Financial resource strain: Not on file    Food insecurity     Worry: Not on file     Inability: Not on file   [employer] needs     Medical: Not on file     Non-medical: Not on file   Tobacco Use    Smoking status: Never Smoker    Smokeless tobacco: Never Used   Substance and Sexual Activity    Alcohol use:  Yes     Alcohol/week: 6.0 standard drinks     Types: 6 Shots of liquor per week     Frequency: Monthly or less     Drinks per session: 1 or 2     Binge frequency: Never    Drug use: No    Sexual activity: Yes   Lifestyle    Physical activity     Days per week: Not on file     Minutes per session: Not on file    Stress: Not on file   Relationships    Social connections     Talks on phone: Not on file     Gets together: Not on file     Attends Congregational service: Not on file     Active member of club or organization: Not on file     Attends meetings of clubs or organizations: Not on file     Relationship status: Not on file    Intimate partner violence     Fear of current or ex partner: Not on file     Emotionally abused: Not on file     Physically abused: Not on file     Forced sexual activity: Not on file   Other Topics Concern    Not on file   Social History Narrative    Not on file       Family History:       Problem Relation Age of Onset    Stroke Mother     Cancer Mother     Heart Attack Father     Heart Disease Father     Arthritis Sister     Cancer Brother         BLADDER    No Known Problems Maternal Grandmother     No Known Problems Maternal Grandfather     Heart Disease Paternal Grandfather     No Known Problems Sister         Review of systems:  Review of Systems   Constitutional: Negative. HENT: Negative. Eyes: Negative. Respiratory: Positive for chest tightness and shortness of breath. Negative for apnea and cough. Cardiovascular: Positive for chest pain. Negative for palpitations and leg swelling. Gastrointestinal: Negative. Genitourinary: Negative. Neurological: Negative. A full 14 point review of systems is otherwise negative outside listed above and HPI      Physical Exam:        Physical Exam  Vitals signs and nursing note reviewed. Constitutional:       Appearance: Normal appearance. HENT:      Head: Normocephalic. Nose: Nose normal.   Eyes:      Pupils: Pupils are equal, round, and reactive to light. Neck:      Musculoskeletal: Normal range of motion. Cardiovascular:      Rate and Rhythm: Normal rate and regular rhythm. Pulses: Normal pulses. Pulmonary:      Effort: Pulmonary effort is normal.   Abdominal:      General: Abdomen is flat. Bowel sounds are normal.      Palpations: Abdomen is soft. Musculoskeletal: Normal range of motion. Skin:     General: Skin is warm. Neurological:      General: No focal deficit present. Mental Status: He is alert.                  CBC:   Recent Labs     03/05/21 1113 03/06/21 0223   WBC 8.4 9.9   HGB 14.5 13.3*    220     BMP:    Recent Labs     03/05/21 1113 03/06/21 0223    141   K 4.0 3.8    102   CO2 24 30*   BUN 16 18   CREATININE 0.8 0.9   GLUCOSE 154* 98     Hepatic:   Recent Labs     03/05/21 1113 03/06/21  0223   AST 13 12   ALT 15 15   BILITOT 0.5 0.4   ALKPHOS 69 64     Troponin T:   Recent Labs     03/05/21  1332 03/05/21 2001 03/06/21  0717 TROPONINI <0.01 <0.01 <0.01     Pro-BNP: No results for input(s): BNP in the last 72 hours. Lipids: No results for input(s): CHOL, HDL in the last 72 hours. Invalid input(s): LDLCALCU  ABGs: No results found for: PHART, PO2ART, [id number]  INR: No results for input(s): INR in the last 72 hours. -----------------------------------------------------------------    Radiology:     Xr Chest Portable    Result Date: 3/5/2021  EXAMINATION: XR CHEST PORTABLE 3/5/2021 12:42 PM HISTORY: Chest pain. Report: A portable upright frontal view the chest was obtained. COMPARISON: Chest x-ray 12/17/2015. The lungs are mildly hypoaerated, no infiltrate is seen. Heart size is normal. No pneumothorax or effusion is identified. The bony thorax and upper abdomen are unremarkable. Impression: Shallow inspiration, no acute findings. Signed by Dr Fredy Gallagher.  Adelso on 3/5/2021 12:43 PM      Assessment and Plan   UNSTABLE ANGINA  CAD  HTN  DMII  DYSLIPIDEMIA    PLAN:    SERIAL [de-identified]  [facility] FOLLOWING    Debbi Jackson MD  3/6/2021 11:44 AM

## 2021-03-08 ENCOUNTER — CARE COORDINATION (OUTPATIENT)
Dept: CASE MANAGEMENT | Age: 71
End: 2021-03-08

## 2021-03-08 VITALS
DIASTOLIC BLOOD PRESSURE: 69 MMHG | RESPIRATION RATE: 14 BRPM | SYSTOLIC BLOOD PRESSURE: 108 MMHG | HEIGHT: 74 IN | TEMPERATURE: 97.8 F | HEART RATE: 62 BPM | WEIGHT: 270 LBS | BODY MASS INDEX: 34.65 KG/M2 | OXYGEN SATURATION: 96 %

## 2021-03-08 LAB
LV EF: 60 %
LVEF MODALITY: NORMAL

## 2021-03-08 NOTE — CARE COORDINATION
[facility] Transitions Initial Follow Up Call    Call within 2 business days of discharge: Yes    Patient: Alfred Pacheco Patient : 1950   MRN: [id number]  Reason for Admission: Chest pain  Discharge Date: 3/6/21 RARS: Readmission Risk Score: 12      Last Discharge [address] [address]       Complaint Diagnosis Description Type Department Provider    3/5/21 Chest Pain; Shortness of Breath Chest pain, unspecified type ED to Hosp-Admission (Discharged) (ADMITTED) STAN Bradshaw MD; Abhilash Berger. .. Spoke with: N/A    Facility: [address] [place] [address]    Non-face-to-face services provided:  Reviewed encounter information for continuity of care prior to follow up Care Transitions phone call - chart notes, consults, progress notes, test results, med list, appointments, AVS, other information. Care Transitions 24 Hour Call    Patient DME: Straight cane  Patient Home Equipment: CPAP  Do you have support at home?: Partner/Spouse/SO  Are you an active caregiver in your home?: No  Care Transitions Interventions         Follow Up  Future Appointments   Date Time Provider Lenny Bell   2021 10:30 MD DARLYN Zavala [facility]   2021 10:30 AM MD DARLYN N[place] [facility]     Attempted to make contact with patient/caregiver for an initial follow up call post discharge without success. Unable to leave a message regarding intent of call and call back information. Call went to an unidentifiable voice mail. CTN to try again at a later time.      Michaelle Pereira RN

## 2021-03-09 ENCOUNTER — CARE COORDINATION (OUTPATIENT)
Dept: CASE MANAGEMENT | Age: 71
End: 2021-03-09

## 2021-03-09 LAB
EKG P AXIS: 49 DEGREES
EKG P-R INTERVAL: 174 MS
EKG Q-T INTERVAL: 458 MS
EKG QRS DURATION: 166 MS
EKG QTC CALCULATION (BAZETT): 446 MS
EKG T AXIS: -10 DEGREES

## 2021-04-09 ENCOUNTER — OFFICE VISIT (OUTPATIENT)
Dept: CARDIOLOGY CLINIC | Age: 71
End: 2021-04-09
Payer: MEDICARE

## 2021-04-09 VITALS
WEIGHT: 272 LBS | OXYGEN SATURATION: 98 % | SYSTOLIC BLOOD PRESSURE: 110 MMHG | BODY MASS INDEX: 34.91 KG/M2 | HEIGHT: 74 IN | HEART RATE: 68 BPM | DIASTOLIC BLOOD PRESSURE: 70 MMHG

## 2021-04-09 DIAGNOSIS — I25.10 CORONARY ARTERY DISEASE INVOLVING NATIVE CORONARY ARTERY OF NATIVE HEART WITHOUT ANGINA PECTORIS: Primary | ICD-10-CM

## 2021-04-09 PROCEDURE — 3017F COLORECTAL CA SCREEN DOC REV: CPT | Performed by: INTERNAL MEDICINE

## 2021-04-09 PROCEDURE — 4040F PNEUMOC VAC/ADMIN/RCVD: CPT | Performed by: INTERNAL MEDICINE

## 2021-04-09 PROCEDURE — G8427 DOCREV CUR MEDS BY ELIG CLIN: HCPCS | Performed by: INTERNAL MEDICINE

## 2021-04-09 PROCEDURE — 1036F TOBACCO NON-USER: CPT | Performed by: INTERNAL MEDICINE

## 2021-04-09 PROCEDURE — 99214 OFFICE O/P EST MOD 30 MIN: CPT | Performed by: INTERNAL MEDICINE

## 2021-04-09 PROCEDURE — 1123F ACP DISCUSS/DSCN MKR DOCD: CPT | Performed by: INTERNAL MEDICINE

## 2021-04-09 PROCEDURE — G8417 CALC BMI ABV UP PARAM F/U: HCPCS | Performed by: INTERNAL MEDICINE

## 2021-04-09 RX ORDER — KETOCONAZOLE 20 MG/G
CREAM TOPICAL DAILY
COMMUNITY

## 2021-04-09 ASSESSMENT — ENCOUNTER SYMPTOMS
WHEEZING: 0
BACK PAIN: 0
COUGH: 0
SHORTNESS OF BREATH: 0
DIARRHEA: 0
ABDOMINAL DISTENTION: 0
ABDOMINAL PAIN: 0
VOMITING: 0
BLOOD IN STOOL: 0

## 2021-04-09 NOTE — PROGRESS NOTES
[facility]  Cardiology Office Note  [address] [id number]  Phone: [phone]  Fax: [phone]                            Date:  4/9/2021  Patient: Suma Campbell  Age:  72 y.o., 1950    Referral: No ref. provider found      PROBLEM LIST:    Patient Active Problem List    Diagnosis Date Noted    Syncope      Priority: High    Chest pain 03/05/2021     Priority: Low    Ventricular tachyarrhythmia ([address] 01/04/2020     Priority: Low    V-tach ([facility] [address] 01/03/2020     Priority: Low    Right ventricular enlargement 01/28/2019     Priority: Low    Biatrial enlargement 01/28/2019     Priority: Low    Type 2 diabetes mellitus with complication, without long-term current use of insulin ([facility] [address] 01/28/2019     Priority: Low    Sleep apnea, obstructive 09/21/2017     Priority: Low    Coronary atherosclerosis due to calcified coronary lesion of native artery      Priority: Low    Hypertension      Priority: Low    Class 2 obesity due to excess calories without serious comorbidity with body mass index (BMI) of 38.0 to 38.9 in adult      Priority: Low    ELROY on CPAP      Priority: Low     1. Coronary artery disease, abnormal dobutamine stress echo with NSVT, cardiac catheterization 1/6/2020 with mid LAD 55% stenosis, IFR 0.92, medically managed, negative Lexiscan study 3/6/2021, normal LV ejection fraction. 2.  Non-insulin-dependent diabetes mellitus. 3.  Hypertension. 4.  Severe obesity with ELROY. PRESENTATION: Suma Campbell is a 72y.o. year old male presents for follow-up evaluation. In March he had run out of his Imdur for couple of days when he started developing chest pressure again which prompted an emergency room visit in a day in the hospital.  He did undergo a [facility] study which was negative. Once restarting his Imdur his symptoms have resolved and he feels good again.   He was originally seen in January 2020 after an abnormal 02/09/2005    CARDIAC CATHETERIZATION  1998    no blockages    COLONOSCOPY  05/22/2006    ELBOW SURGERY Right 08/21/2013    TOTAL KNEE ARTHROPLASTY Right 03/18/2013    UPPER GASTROINTESTINAL ENDOSCOPY  2002    UPPER GASTROINTESTINAL ENDOSCOPY  04/19/2012    UPPER GASTROINTESTINAL ENDOSCOPY         Medications:  Current Outpatient Medications   Medication Sig Dispense Refill    ketoconazole (NIZORAL) 2 % cream Apply topically daily Apply topically daily.  nitroGLYCERIN (NITROSTAT) 0.4 MG SL tablet up to max of 3 total doses.  If no relief after 1 dose, call 911. 25 tablet 3    dapagliflozin (FARXIGA) 5 MG tablet Take 10 mg by mouth daily       Semaglutide, 1 MG/DOSE, 2 MG/1.5ML SOPN Inject into the skin once a week      pravastatin (PRAVACHOL) 20 MG tablet TAKE 1 TABLET BY MOUTH DAILY 90 tablet 3    isosorbide mononitrate (IMDUR) 30 MG extended release tablet Take 1 tablet by mouth daily 30 tablet 5    budesonide-formoterol (SYMBICORT) 160-4.5 MCG/ACT AERO Inhale 2 puffs into the lungs 2 times daily      aclidinium (TUDORZA PRESSAIR) 400 MCG/ACT AEPB inhaler Inhale 1 puff into the lungs 2 times daily      aspirin 81 MG tablet Take 81 mg by mouth daily      albuterol sulfate HFA (PROAIR HFA) 108 (90 BASE) MCG/ACT inhaler Inhale 2 puffs into the lungs every 6 hours as needed for Wheezing      azelastine HCl 0.15 % SOLN by Nasal route      fluticasone (FLONASE) 50 MCG/ACT nasal spray 1 spray by Nasal route daily      lansoprazole (PREVACID) 30 MG delayed release capsule Take 30 mg by mouth daily      vitamin D (ERGOCALCIFEROL) 18957 UNITS CAPS capsule Take 50,000 Units by mouth once a week       ferrous sulfate 325 (65 FE) MG tablet Take 325 mg by mouth daily (with breakfast)       FLUoxetine (PROZAC) 20 MG capsule Take 20 mg by mouth daily       hydrochlorothiazide (HYDRODIURIL) 12.5 MG tablet Take 12.5 mg by mouth daily       levocetirizine (XYZAL) 5 MG tablet Take 5 mg by mouth nightly  timolol (TIMOPTIC-XE) 0.5 % ophthalmic gel-forming Place 1 drop into both eyes nightly       propranolol (INDERAL LA) 120 MG extended release capsule Take 1 capsule by mouth daily (Patient not taking: Reported on 4/9/2021) 90 capsule 3     No current facility-administered medications for this visit. Allergies:  Iodine, Shellfish allergy, Pcn [penicillins], and Sulfa antibiotics    Past Social History:  Social History     Socioeconomic History    Marital status:      Spouse name: Not on file    Number of children: Not on file    Years of education: Not on file    Highest education level: Not on file   Occupational History    Not on file   Social Needs    Financial resource strain: Not on file    Food insecurity     Worry: Not on file     Inability: Not on file    Transportation needs     Medical: Not on file     Non-medical: Not on file   Tobacco Use    Smoking status: Never Smoker    Smokeless tobacco: Never Used   Substance and Sexual Activity    Alcohol use:  Yes     Alcohol/week: 6.0 standard drinks     Types: 6 Shots of liquor per week     Frequency: Monthly or less     Drinks per session: 1 or 2     Binge frequency: Never    Drug use: No    Sexual activity: Yes   Lifestyle    Physical activity     Days per week: Not on file     Minutes per session: Not on file    Stress: Not on file   Relationships    Social connections     Talks on phone: Not on file     Gets together: Not on file     Attends Sikhism service: Not on file     Active member of club or organization: Not on file     Attends meetings of clubs or organizations: Not on file     Relationship status: Not on file    Intimate partner violence     Fear of current or ex partner: Not on file     Emotionally abused: Not on file     Physically abused: Not on file     Forced sexual activity: Not on file   Other Topics Concern    Not on file   Social History Narrative    Not on file       Family History:       Problem Relation Age of Onset    Stroke Mother     Cancer Mother     Heart Attack Father     Heart Disease Father     Arthritis Sister     Cancer Brother         BLADDER    No Known Problems Maternal Grandmother     No Known Problems Maternal Grandfather     Heart Disease Paternal Grandfather     No Known Problems Sister          Physical Examination:  /70   Pulse 68   Ht 6' 2\" (1.88 m)   Wt 272 lb (123.4 kg)   SpO2 98%   BMI 34.92 kg/m²   Physical Exam  Constitutional:       Appearance: He is well-developed. He is obese. Comments: Severe truncal and generalized obesity  Blood pressure right arm sitting 90/60 mmHg, pulse 68 bpm regular   HENT:      Mouth/Throat:      Pharynx: No oropharyngeal exudate. Eyes:      General: No scleral icterus. Right eye: No discharge. Left eye: No discharge. Neck:      Thyroid: No thyromegaly. Vascular: No JVD. Comments: No carotid bruits  Cardiovascular:      Rate and Rhythm: Normal rate and regular rhythm. Heart sounds: No murmur. No friction rub. No gallop. Comments: No JVD  No edema  No significant systolic or diastolic murmurs appreciated  No gallop noted  Pulmonary:      Effort: No respiratory distress. Breath sounds: No stridor. No wheezing or rales. Abdominal:      General: Bowel sounds are normal. There is no distension. Palpations: Abdomen is soft. There is no mass. Tenderness: There is no abdominal tenderness. There is no guarding or rebound. Comments: No palpable organomegaly   Musculoskeletal:         General: No deformity. Skin:     General: Skin is warm. Coloration: Skin is not pale. Findings: No erythema or rash. Neurological:      Mental Status: He is alert and oriented to person, place, and time. Motor: No abnormal muscle tone.       Coordination: Coordination normal.      Deep Tendon Reflexes: Reflexes normal.           Labs:   CBC: No results for input(s): WBC, HGB, HCT, PLT in the last 72 hours. BMP:No results for input(s): NA, K, CO2, BUN, CREATININE, LABGLOM, GLUCOSE in the last 72 hours. BNP: No results for input(s): BNP in the last 72 hours. PT/INR: No results for input(s): PROTIME, INR in the last 72 hours. APTT:No results for input(s): APTT in the last 72 hours. CARDIAC ENZYMES:No results for input(s): CKTOTAL, CKMB, CKMBINDEX, TROPONINI in the last 72 hours. FASTING LIPID PANEL:No results found for: HDL, LDLDIRECT, LDLCALC, TRIG  LIVER PROFILE:No results for input(s): AST, ALT, LABALBU in the last 72 hours. Imaging:    Procedure     Procedure Type      Diagnostic procedure:DCA, Coronary Angiogram, Left Heart Cath, Left   Ventriculogram, Left Ventricular Pressure Measurement, Other Diagnostic   Procedure, iFR      Conclusions      Single-vessel disease with intermediate grade mid LAD stenosis. Normal LV ejection fraction. Recommendations      Medical management. Reevaluate LAD if exertional symptoms should occur. Signatures      ----------------------------------------------------------------   Electronically signed by Nadine Maddox MD(Performing Physician) on   01/07/2020 01:42   ----------------------------------------------------------------      Angiographic Findings      Cardiac Arteries and Lesion Findings     LMCA: Left Main widely patent.     LAD: LAD mid 60% stenosis. Nonobstructive my IFR testing (IFR equals 0.92). Lesion on Mid LAD: 60% stenosis . LCx: Circumflex with mild luminal irregularities.     RCA: RCA with moderate luminal irregularities.      VA     LV function assessed as:Normal.  Ejection Fraction    - Method: LV gram. EF%: 60.    Lexiscan Nuclear Stress Test Report   Procedure date: 3/6/2021   Indications: chest pain   Procedure: Stress was performed with injection of 0.4 mg Lexiscan. Vital signs and EKG were monitored.  Technetium-99 sestamibi was   injected in divided doses, 11 mCi and 31.7 mCi respectively for rest   and stress imaging. The patient was discharged in stable condition. Results: Patient had symptoms of dyspnea during infusion that resolved   in recovery. Baseline EKG showed normal sinus rhythm with nonspecific   ST/T changes. During stress there were no significant EKG changes or   rhythm changes. Baseline and peak blood pressures were 107/75, and   110/72 respectively.  Baseline and peak heart rates were 59 and  84   respectively. Myocardial perfusion images showed inferior attenuation artifact. No   ischemia. EF 60%   Normal wall motion. Signed by Dr Kelsey Carreno on 3/6/2021 11:58 AM       ASSESSMENT and PLAN:    61-year-old gentleman with past medical history of non-insulin-dependent diabetes mellitus, hypertension, essential tremor, coronary artery disease with prior catheterization after abnormal dobutamine stress echo with NSVT showing mid LAD 55% stenosis, nonobstructive by physiological testing, normal LV ejection fraction significantly improved on medical management here for follow-up evaluation. 1.  Since restarting his Imdur he has been doing well with no significant chest discomfort. At this time I would continue to manage him medically. No ischemia was noted on his [facility] study in the LAD territory. If there is any change in status with recurrent symptoms on nitrates consideration can be made for PCI. 2.  His blood pressures on the lower side and he is only on propranolol and Imdur in this regard. He does take propranolol for tremors as well. At this time he is asymptomatic and I would continue with current medications unchanged. 3.  He will follow-up with me in 7 months. Orders:  No orders of the defined types were placed in this encounter. No orders of the defined types were placed in this encounter. Return in about 7 months (around 11/9/2021).       Electronically signed by Jovi Gonsalez MD on 4/9/2021 at 10:55 [id number] [facility]      Thisdictation was generated by voice recognition computer software. Although all attempts are made to edit the dictation for accuracy, there may be errors in the transcription that are not intended.

## 2021-06-25 ENCOUNTER — TRANSCRIBE ORDERS (OUTPATIENT)
Dept: ADMINISTRATIVE | Facility: HOSPITAL | Age: 71
End: 2021-06-25

## 2021-06-25 DIAGNOSIS — E11.9 TYPE 2 DIABETES MELLITUS WITHOUT COMPLICATION, UNSPECIFIED WHETHER LONG TERM INSULIN USE (HCC): Primary | ICD-10-CM

## 2021-07-12 ENCOUNTER — HOSPITAL ENCOUNTER (OUTPATIENT)
Dept: DIABETES SERVICES | Facility: HOSPITAL | Age: 71
Discharge: HOME | End: 2021-07-12

## 2021-07-13 NOTE — CONSULTS
[facility]   Consult Note    Patient Name: Wyane Linares  : 1950  MRN: [id number]  Primary Care Physician:  Saurabh Orellana MD  Referring Physician: Saurabh Orellana MD  Date of admission: 2021    Consults  Subjective   Subjective     Reason for Consult/ Chief Complaint: Diabetes Education    History of Present Illness  Wayne Linares is a 70 y.o. male with Type 2 Diabetes Mellitus.    Review of Systems     Personal History     Past Medical History:   Diagnosis Date   • Arthritis    • Bundle branch block, right    • COPD (chronic obstructive pulmonary disease) (CMS/formerly [facility])    • Coronary artery disease    • Diabetes mellitus (CMS/formerly [facility])    • Hypertension    • Irregular heart beat    • Sleep apnea     uses bi-pap       Past Surgical History:   Procedure Laterality Date   • CHOLECYSTECTOMY     • COLONOSCOPY  2014    One 95cm polyp removed.  repeat 3 years  Dr Brar   • COLONOSCOPY N/A 2017    Procedure: COLONOSCOPY WITH ANESTHESIA;  Surgeon: Shivam Brar DO;  Location: [facility] ENDOSCOPY;  Service:    • ENDOSCOPY  2014    [facility]  repeat 3 years Dr Brar   • ENDOSCOPY N/A 2017    Procedure: ESOPHAGOGASTRODUODENOSCOPY WITH ANESTHESIA;  Surgeon: Shivam Brar DO;  Location: [facility] ENDOSCOPY;  Service:    • ENDOSCOPY N/A 2020    Procedure: ESOPHAGOGASTRODUODENOSCOPY WITH ANESTHESIA;  Surgeon: Shivam Brar DO;  Location: [facility] ENDOSCOPY;  Service: Gastroenterology;  Laterality: N/A;  Pre: Reflux  Post: [facility]  Dr. Orellana  CO2 Inflation Used   • GASTRIC BYPASS     • KNEE SURGERY Right    • NERVE SURGERY      left arm   • TONSILLECTOMY         Family History: family history includes Cancer in his brother and mother; Heart disease in his father. Otherwise pertinent FHx was reviewed and not pertinent to current issue.    Social History:  reports that he has never smoked. He has never used smokeless tobacco. He reports previous alcohol use. He reports that he  does not use drugs.    Home Medications:   EPINEPHrine, FLUoxetine, Semaglutide, aclidinium bromide, albuterol sulfate HFA, aspirin, azelastine, budesonide-formoterol, dapagliflozin, famotidine, ferrous sulfate, fluticasone, hydroCHLOROthiazide, isosorbide mononitrate, lansoprazole, levocetirizine, pravastatin, propranolol LA, timolol, and vitamin D    Allergies:  Allergies   Allergen Reactions   • Iodides Anaphylaxis   • Shellfish-Derived Products Anaphylaxis     Sea foods   • Penicillins Rash   • Sulfa Antibiotics Rash       Objective    Objective     Vitals:       Physical Exam    Result Review    Result Review:  I have personally reviewed the results from the time of this admission to 7/13/2021 12:53 CDT and agree with these findings:  []  Laboratory  []  Microbiology  []  Radiology  []  EKG/Telemetry   []  Cardiology/Vascular   []  Pathology  []  Old records  []  Other:  Most notable findings include: Pt states A1C is between 7-8%    Assessment/Plan   Assessment / Plan     Brief Patient Summary:  Wayne Linares is a 70 y.o. male who has Diabetes mellitus and is attending as review.  Pt has started Ozempic and reports a 60 lb weight loss.    Active Hospital Problems:  There are no active hospital problems to display for this patient.    Plan: Diabetes Education class.      Electronically signed by Leandra Cabral RN, 07/13/21, 12:53 PM CDT.

## 2021-07-28 ENCOUNTER — OFFICE VISIT (OUTPATIENT)
Dept: GASTROENTEROLOGY | Facility: CLINIC | Age: 71
End: 2021-07-28

## 2021-07-28 VITALS
SYSTOLIC BLOOD PRESSURE: 122 MMHG | DIASTOLIC BLOOD PRESSURE: 80 MMHG | OXYGEN SATURATION: 97 % | HEART RATE: 69 BPM | HEIGHT: 74 IN | BODY MASS INDEX: 34.52 KG/M2 | WEIGHT: 269 LBS | TEMPERATURE: 97.7 F

## 2021-07-28 DIAGNOSIS — R19.7 DIARRHEA, UNSPECIFIED TYPE: Primary | ICD-10-CM

## 2021-07-28 PROCEDURE — 99214 OFFICE O/P EST MOD 30 MIN: CPT | Performed by: INTERNAL MEDICINE

## 2021-07-28 RX ORDER — CHLORAL HYDRATE 500 MG
CAPSULE ORAL
COMMUNITY

## 2021-07-28 NOTE — PROGRESS NOTES
"Chief Complaint   Patient presents with   • Diarrhea     last year has gotten worse--last colon 08/2017- hx of polyps       PCP: Saurabh Orellana MD  REFER: No ref. provider found    Subjective     HPI     Long term issues with diarrhea, reports diarrhea occurring over 10 yr  He reports \"violent dumps\" since gastric bypass  Denies bleeding  Denies abdominal pain  Some weight loss noted (11 lb weight  Loss since Feb 2021)  Stools are described as oily  Gastric bypass 2006.   He is not using imodium or Colestid at this time, bowels are moving 5 times per day  He will have small formed stool in morning, followed by loose stool, then experience 3 loose BM at night apx 2 hours after eating evening meal (does not matter if he eats at restaurant vs home).  Diarrhea is not effected by consumption of fatty food vs vegetable.  He stopped consumption of caffeine and is following with diabetic/dietary educator      Past Medical History:   Diagnosis Date   • Arthritis    • Bundle branch block, right    • COPD (chronic obstructive pulmonary disease) (CMS/[facility])    • Coronary artery disease    • Diabetes mellitus (CMS/[facility])    • Hypertension    • Irregular heart beat    • Sleep apnea     uses bi-pap       Past Surgical History:   Procedure Laterality Date   • CHOLECYSTECTOMY     • COLONOSCOPY  05/07/2014    One 95cm polyp removed.  repeat 3 years  Dr Brar   • COLONOSCOPY N/A 8/1/2017    Procedure: COLONOSCOPY WITH ANESTHESIA;  Surgeon: Shivam Brar DO;  Location: [facility] ENDOSCOPY;  Service:    • ENDOSCOPY  05/07/2014    [facility]  repeat 3 years Dr Brar   • ENDOSCOPY N/A 7/26/2017    Procedure: ESOPHAGOGASTRODUODENOSCOPY WITH ANESTHESIA;  Surgeon: Shivam Brar DO;  Location: [facility] ENDOSCOPY;  Service:    • ENDOSCOPY N/A 8/7/2020    Procedure: ESOPHAGOGASTRODUODENOSCOPY WITH ANESTHESIA;  Surgeon: Shivam Brar DO;  Location: [facility] ENDOSCOPY;  Service: Gastroenterology;  Laterality: N/A;  Pre: Reflux  Post: " [facility]  Dr. Orellana  CO2 Inflation Used   • GASTRIC BYPASS     • KNEE SURGERY Right    • NERVE SURGERY      left arm   • TONSILLECTOMY         Outpatient Medications Marked as Taking for the 7/28/21 encounter (Office Visit) with Shivam Brar, DO   Medication Sig Dispense Refill   • aclidinium bromide (TUDORZA PRESSAIR) 400 MCG/ACT aerosol powder  powder for inhalation Inhale 1 puff 2 (Two) Times a Day. 3 each 4   • albuterol (PROVENTIL HFA;VENTOLIN HFA) 108 (90 BASE) MCG/ACT inhaler Inhale.     • aspirin 81 MG tablet Take  by mouth.     • EPINEPHrine (EPIPEN) 0.3 MG/0.3ML solution auto-injector injection Inject  into the shoulder, thigh, or buttocks.     • famotidine (PEPCID) 10 MG tablet Take 10 mg by mouth 2 (Two) Times a Day.     • Farxiga 5 MG tablet tablet      • ferrous sulfate 325 (65 FE) MG tablet Take  by mouth.     • FLUoxetine (PROzac) 20 MG capsule TAKE 1 CAPSULE BY MOUTH DAILY IN THE MORNING  2   • hydrochlorothiazide (HYDRODIURIL) 12.5 MG tablet      • isosorbide mononitrate (IMDUR) 30 MG 24 hr tablet      • lansoprazole (PREVACID) 30 MG capsule      • levocetirizine (XYZAL) 5 MG tablet Take  by mouth.     • Omega-3 Fatty Acids (fish oil) 1000 MG capsule capsule Take  by mouth Daily With Breakfast.     • pravastatin (PRAVACHOL) 10 MG tablet Take 10 mg by mouth Daily.     • propranolol LA (INDERAL LA) 120 MG 24 hr capsule      • Semaglutide (OZEMPIC, 1 MG/DOSE, SC) Inject  under the skin into the appropriate area as directed 1 (One) Time Per Week.     • SYMBICORT 160-4.5 MCG/ACT inhaler Inhale 2 puffs 2 (Two) Times a Day. 3 inhaler 4   • timolol (TIMOPTIC-XR) 0.5 % ophthalmic gel-forming 1 SQUIRT IN BOTH EYES DAILY  5   • vitamin D (ERGOCALCIFEROL) 14021 UNITS capsule capsule          Allergies   Allergen Reactions   • Iodides Anaphylaxis   • Shellfish-Derived Products Anaphylaxis     Sea foods   • Penicillins Rash   • Sulfa Antibiotics Rash       Social History     Socioeconomic History   •  "Marital status:      Spouse name: Not on file   • Number of children: Not on file   • Years of education: Not on file   • Highest education level: Not on file   Tobacco Use   • Smoking status: Never Smoker   • Smokeless tobacco: Never Used   Substance and Sexual Activity   • Alcohol use: Not Currently   • Drug use: No   • Sexual activity: Defer       Review of Systems   Constitutional: Negative for unexpected weight change.   Respiratory: Negative for shortness of breath.    Cardiovascular: Negative for chest pain.   Gastrointestinal: Positive for diarrhea. Negative for abdominal pain and anal bleeding.       Objective     Vitals:    07/28/21 1245   BP: 122/80   Pulse: 69   Temp: 97.7 °F (36.5 °C)   SpO2: 97%   Weight: 122 kg (269 lb)   Height: 188 cm (74\")     Body mass index is 34.54 kg/m².    Physical Exam  Constitutional:       Appearance: Normal appearance. He is well-developed.   Eyes:      General: No scleral icterus.  Neck:      Thyroid: No thyroid mass or thyromegaly.      Vascular: No JVD.   Pulmonary:      Effort: Pulmonary effort is normal. No accessory muscle usage.   Abdominal:      General: There is no distension.      Tenderness: There is no abdominal tenderness. There is no guarding.   Skin:     Coloration: Skin is not jaundiced.   Neurological:      Mental Status: He is alert.   Psychiatric:         Behavior: Behavior is cooperative.         Judgment: Judgment normal.         Imaging Results (Most Recent)     None          Body mass index is 34.54 kg/m².    Assessment/Plan     Diagnoses and all orders for this visit:    1. Diarrhea, unspecified type (Primary)  -     Case Request; Standing  -     Implement Anesthesia Orders Day of Procedure; Standing  -     Obtain Informed Consent; Standing  -     Case Request        ESOPHAGOGASTRODUODENOSCOPY WITH ANESTHESIA (N/A)    Discussion regarding Pancreatic insufficiency.  Due to lack of significant weight loss and other symptoms (I.e. number of " daily BM)  I do not suspect EPI.  However, if he wishes to pursue this testing can send for 24 hour fecal fat testing   I do not suspect diarrhea is related to infection due to length of time of symptoms have been present  Add  fiber to daily regimen  Eat smaller meal in evening, eat larger meals breakfast/lunch    EGD for small bowel bx   ? Colonoscopy for small bowel bx     Advised pt to stop use of NSAIDs, Fish Oil, and MV 5 days prior to procedure, per Dr Brar protocol.  Tylenol based products are ok to take.  Pt verbalized understanding.          Shivam Brar, DO  07/28/21          There are no Patient Instructions on file for this visit.

## 2021-08-02 ENCOUNTER — TRANSCRIBE ORDERS (OUTPATIENT)
Dept: LAB | Facility: HOSPITAL | Age: 71
End: 2021-08-02

## 2021-08-02 DIAGNOSIS — Z01.818 PREOPERATIVE TESTING: Primary | ICD-10-CM

## 2021-08-06 NOTE — PROGRESS NOTES
"Background:  Patient with gold stage II COPD 2018, treated with Tudorza and Symbicort.   Chief Complaint  OBSTRUCTIVE SLEEP APNEA SYNDROME (has had 2 doses of moderna but not sure on dates)    Subjective    History of Present Illness       Wayne Linares presents to [facility].  Cpap is working great and he uses it 9-10 hours sometimes.  No breathing exacerbations.  He has not needed the inhalers much at all.  No exacerbations.  He has had corona vaccine moderna.        Objective     Vital Signs:   /84   Pulse 63   Ht 188 cm (74\")   Wt 122 kg (270 lb)   SpO2 96% Comment: RA  BMI 34.67 kg/m²   Physical Exam  Constitutional:       Appearance: Normal appearance. He is not ill-appearing or diaphoretic.   Eyes:      Extraocular Movements: Extraocular movements intact.   Pulmonary:      Effort: Pulmonary effort is normal. No respiratory distress.      Breath sounds: No wheezing, rhonchi or rales.   Skin:     Findings: No erythema or rash.   Neurological:      Mental Status: He is alert.        Result Review  Data Reviewed:{ Labs  Result Review  Imaging  Media :23}     CXR [facility] 3/2021:  EXAMINATION: XR CHEST PORTABLE 3/5/2021 12:42 PM   HISTORY: Chest pain.   Report: A portable upright frontal view the chest was obtained.   COMPARISON: Chest x-ray 12/17/2015.   The lungs are mildly hypoaerated, no infiltrate is seen. Heart size is   normal. No pneumothorax or effusion is identified. The bony thorax and   upper abdomen are unremarkable.   IMPRESSION:   Impression: Shallow inspiration, no acute findings.     Signed by Dr Saurabh Mireles on 3/5/2021 12:43 PM    PFT Values        Some values may be hidden. Unless noted otherwise, only the newest values recorded on each date are displayed.         Old Values PFT Results 8/9/21   No data to display.      Pre Drug PFT Results 8/9/21   FVC 93   FEV1 77   FEF 25-75% 42   FEV1/FVC 65.66      Post Drug PFT " Results 8/9/21   No data to display.      Other Tests PFT Results 8/9/21   No data to display.                      Assessment and Plan  {CC Problem List  Visit Diagnosis  ROS  Review (Popup)  Health Maintenance  Quality  BestPractice  Medications  SmartSets  SnapShot Encounters  Media :23}   Problem List Items Addressed This Visit        Pulmonary Problems    Obstructive sleep apnea syndrome    Stage 2 moderate COPD by GOLD classification (CMS/[facility]) - Primary    Overview     fev1 78% pred 2019         Relevant Orders    Pulmonary Function Test (Completed)        He is stable.  Continue monitoring sx.  Discussed stable PFT.  Continue inhalers as is.  Continue cpap.     Follow Up {Instructions Charge Capture  Follow-up Communications :23}   Return in about 1 year (around 8/9/2022).  Patient was given instructions and counseling regarding his condition or for health maintenance advice. Please see specific information pulled into the AVS if appropriate.    Electronically signed by Laron Sparrow MD, 8/9/2021, 11:41 CDT

## 2021-08-09 ENCOUNTER — OFFICE VISIT (OUTPATIENT)
Dept: PULMONOLOGY | Facility: CLINIC | Age: 71
End: 2021-08-09

## 2021-08-09 ENCOUNTER — LAB (OUTPATIENT)
Dept: LAB | Facility: HOSPITAL | Age: 71
End: 2021-08-09

## 2021-08-09 VITALS
WEIGHT: 270 LBS | BODY MASS INDEX: 34.65 KG/M2 | HEART RATE: 63 BPM | SYSTOLIC BLOOD PRESSURE: 162 MMHG | HEIGHT: 74 IN | OXYGEN SATURATION: 96 % | DIASTOLIC BLOOD PRESSURE: 84 MMHG

## 2021-08-09 DIAGNOSIS — J44.9 STAGE 2 MODERATE COPD BY GOLD CLASSIFICATION (HCC): Primary | ICD-10-CM

## 2021-08-09 DIAGNOSIS — Z01.818 PREOPERATIVE TESTING: ICD-10-CM

## 2021-08-09 DIAGNOSIS — G47.33 OBSTRUCTIVE SLEEP APNEA SYNDROME: ICD-10-CM

## 2021-08-09 LAB — SARS-COV-2 RNA PNL SPEC NAA+PROBE: NOT DETECTED

## 2021-08-09 PROCEDURE — 87635 SARS-COV-2 COVID-19 AMP PRB: CPT

## 2021-08-09 PROCEDURE — 94010 BREATHING CAPACITY TEST: CPT | Performed by: INTERNAL MEDICINE

## 2021-08-09 PROCEDURE — C9803 HOPD COVID-19 SPEC COLLECT: HCPCS

## 2021-08-09 PROCEDURE — 99213 OFFICE O/P EST LOW 20 MIN: CPT | Performed by: INTERNAL MEDICINE

## 2021-08-09 NOTE — PROCEDURES
Pulmonary Function Test  Performed by: Rosalba Lujan, RRT  Authorized by: Laron Sparrow MD      Pre Drug % Predicted    FVC: 93%   FEV1: 77%   FEF 25-75%: 42%   FEV1/FVC: 65.66%    Interpretation   Spirometry   Spirometry shows moderate obstruction.   Review of FVL curve   Patient's effort is normal.

## 2021-08-09 NOTE — PROGRESS NOTES
HISTORY OF PRESENT ILLNESS:  Patient presents today to follow-up. Currently taking oxycodone acetaminophen 5-325 milligrams one half tablet at nightly for his chronic pain. Is followed by the interventional pain management but does do percutaneous radiofrequency thermocoagulation of medial nerve branches of L4-L5 and L5-S1 facet joints with fairly good results.    Followed by endocrinology for his hypogonadism. Is on testosterone injectable therapy. Notes increased sweating that is intolerable during the summer months. Is supposed to have lab work performed and potential dose adjustment per Dr. Becker's recommendations.    Hypertension hyperlipidemia-currently taking Zestril 20 mg daily, chlorthalidone 12.5 mg daily, and lovastatin 40 mg daily. Also is on one baby aspirin a day. Denies any chest pain or shortness of breath    On Protonix 40 mg daily for his esophagitis. Has not had any symptoms    Chronic rhinorrhea especially after eating with morning nasal congestion. Allergy consult and was found to have no environmental allergies. Has heard of procedure where they may do some cauterization to help with these types of symptoms like to discuss with specialist  Past Medical History:   Diagnosis Date   • Adenomatous colon polyp 1/26/16   • Allergic rhinitis    • Arthritis     shoulders   • Calcium oxalate renal stones    • Chronic low back pain    • Chronic pain     bulging disk   • Disorders of bursae and tendons in shoulder region, unspecified 7/23/2012   • Erosive esophagitis 6/27/2016   • Essential (primary) hypertension    • Fracture     arm   • GERD (gastroesophageal reflux disease)    • Hyperlipidemia    • Hypogonadism male    • Lichen planus    • Periesophageal hiatal hernia 6/27/2016   • Personal history of tobacco use, presenting hazards to health     quit 2005   • Psoriasis    • Splenic artery aneurysm (CMS/HCC)    • Squamous cell carcinoma      Social History     Tobacco Use   • Smoking status: Former  Patient had a negative COVID test prior to PFT.  Performed PFT. See scanned results for additional information.     Smoker     Packs/day: 2.00     Years: 30.00     Pack years: 60.00     Types: Cigarettes     Last attempt to quit: 2005     Years since quittin.5   • Smokeless tobacco: Never Used   Substance Use Topics   • Alcohol use: Yes     Alcohol/week: 0.0 oz     Comment: rare   • Drug use: No     Allergies as of 2019 - Reviewed 2019   Allergen Reaction Noted   • Prednisone DIZZINESS and ANXIETY 2010   • Tussionex pennkinetic er DIZZINESS and ANXIETY    • Zocor [simvastatin] MYALGIA      Current Outpatient Medications   Medication Sig Dispense Refill   • oxyCODONE-acetaminophen (PERCOCET) 5-325 MG per tablet Take 0.5 tablets by mouth nightly. 15 tablet 0   • lisinopril (ZESTRIL) 20 MG tablet TAKE 1 TABLET BY MOUTH  DAILY 90 tablet 3   • lovastatin (MEVACOR) 40 MG tablet TAKE 1 TABLET BY MOUTH  NIGHTLY 90 tablet 3   • chlorthalidone (THALITONE) 25 MG tablet Take 0.5 tablets by mouth daily. 45 tablet 3   • pantoprazole (PROTONIX) 40 MG tablet TAKE 1 TABLET BY MOUTH  DAILY 90 tablet 3   • sildenafil (VIAGRA) 25 MG tablet Take 1 tablet by mouth as needed for Erectile Dysfunction. 12 tablet 6   • Ascorbic Acid (VITAMIN C) 1000 MG tablet Take 1,000 mg by mouth daily.     • RUTIN PO Take 500 mg by mouth daily.     • aspirin 81 MG tablet Take 1 tablet by mouth daily.     • Multiple Vitamin (MULTIVITAMINS PO) Take 1 tablet by mouth daily.      • Horsechestnut Extract POWD 300-500 mg daily. 1 Bottle 3   • loratadine (CLARITIN) 10 MG tablet Take 10 mg by mouth daily.       Current Facility-Administered Medications   Medication Dose Route Frequency Provider Last Rate Last Dose   • testosterone cypionate (DEPO-TESTOSTERONE) 100 MG/ML injection 100 mg  100 mg Intramuscular Q14 Days Lydia Lynch MD   100 mg at 19 1033     Facility-Administered Medications Ordered in Other Visits   Medication Dose Route Frequency Provider Last Rate Last Dose   • Iopamidol (ISOVUE-200) 41 % injection    PRDUKE CHAPPELL  MD [facility]   1 mL at 07/14/15 1549   • methylPREDNISolone DEPOT (DEPO-MEDROL) 40 MG/ML injection    PRN Mike CHAPPELL MD [facility]   80 mg at 07/14/15 1551       REVIEW OF SYSTEMS:   A complete review of systems was performed and otherwise unremarkable except for what is stated in the history of present illness.    PHYSICAL EXAMINATION:  Blood pressure 128/86, pulse 93, temperature 98.6 °F (37 °C), temperature source Oral, resp. rate 14, height 5' 10\" (1.778 m), weight 95.1 kg, SpO2 97 %.  General:  Patient appeared in no distress  HENT: Atraumatic, external ears normal, TM bilateral clear, nose normal, oropharynx moist, no pharyngeal exudates. Neck- normal range of motion, no tenderness, supple, no lymphadenopathy.   Cardiovascular:  Regular rhythm, Normal sounds and absence of murmurs, rubs or gallops.  Lungs:  Clear to auscultation without rales, rhonchi, or wheezing.  Abdomen:  Soft, nontender, normoactive bowel sounds, unremarkable and without evidence of organomegaly, masses, or abdominal aortic enlargement, No guarding.  Extremities:  Non-edematous and normal distal pulses. Hirpara vascular changes noted bilaterally, palpable posterior tibialis pulses. Varicose veins  Neuro:  Nonfocal and normal sensation.      Minor was seen today for follow-up.    Diagnoses and all orders for this visit:    Elevated serum creatinine  Comments:  CMP  Orders:  -     MICROALBUMIN URINE RANDOM    Chronic narcotic use  Comments:  chronic back pain. take 1/2 tab oxycodone/APAP nightly    Hypertension, unspecified type    Hypogonadism  Comments:  testosterone injections.  followed by endocrine    Chronic renal insufficiency, stage 3 (moderate) (CMS/HCC)  Comments:  CMP today    Lumbosacral spondylolysis    Lumbosacral spondylosis without myelopathy    Cervical spondylosis without myelopathy    Periesophageal hiatal hernia    Erosive esophagitis  Comments:  asymptomatic on protonix    Esophageal stricture    Other  hyperlipidemia  Comments:  on lovastatin daily    Rhinorrhea  Comments:  see ENT  Orders:  -     SERVICE TO ENT    Chronic nasal congestion  Comments:  see ENT  Orders:  -     SERVICE TO ENT    Psoriasis  Comments:  light therapy at home 3 x weekly.  followed by derm    Lichen planus  Comments:  light therpay followed by derm    Other orders  -     oxyCODONE-acetaminophen (PERCOCET) 5-325 MG per tablet; Take 0.5 tablets by mouth nightly.    Refilled oxycodone. Uses very limited amounts. Now has left shoulder pain and needs a shoulder replacement with orthopedics. Will have lab work repeated conference metabolic panel, microalbuminuria and labs ordered by Dr. Becker  Follow-up 6 months  Supervision by Dr. Ramos

## 2021-08-11 ENCOUNTER — ANESTHESIA (OUTPATIENT)
Dept: GASTROENTEROLOGY | Facility: HOSPITAL | Age: 71
End: 2021-08-11

## 2021-08-11 ENCOUNTER — HOSPITAL ENCOUNTER (OUTPATIENT)
Facility: HOSPITAL | Age: 71
Setting detail: HOSPITAL OUTPATIENT SURGERY
Discharge: HOME | End: 2021-08-11
Attending: INTERNAL MEDICINE | Admitting: INTERNAL MEDICINE

## 2021-08-11 ENCOUNTER — ANESTHESIA EVENT (OUTPATIENT)
Dept: GASTROENTEROLOGY | Facility: HOSPITAL | Age: 71
End: 2021-08-11

## 2021-08-11 VITALS
OXYGEN SATURATION: 97 % | HEIGHT: 74 IN | HEART RATE: 60 BPM | TEMPERATURE: 97 F | DIASTOLIC BLOOD PRESSURE: 65 MMHG | BODY MASS INDEX: 34.27 KG/M2 | WEIGHT: 267 LBS | RESPIRATION RATE: 16 BRPM | SYSTOLIC BLOOD PRESSURE: 110 MMHG

## 2021-08-11 DIAGNOSIS — R19.7 DIARRHEA, UNSPECIFIED TYPE: ICD-10-CM

## 2021-08-11 LAB — GLUCOSE BLDC GLUCOMTR-MCNC: 116 MG/DL (ref 70–130)

## 2021-08-11 PROCEDURE — 88305 TISSUE EXAM BY PATHOLOGIST: CPT | Performed by: INTERNAL MEDICINE

## 2021-08-11 PROCEDURE — 82962 GLUCOSE BLOOD TEST: CPT

## 2021-08-11 PROCEDURE — 43239 EGD BIOPSY SINGLE/MULTIPLE: CPT | Performed by: INTERNAL MEDICINE

## 2021-08-11 PROCEDURE — 25010000002 PROPOFOL 10 MG/ML EMULSION: Performed by: NURSE ANESTHETIST, CERTIFIED REGISTERED

## 2021-08-11 RX ORDER — LIDOCAINE HYDROCHLORIDE 20 MG/ML
INJECTION, SOLUTION EPIDURAL; INFILTRATION; INTRACAUDAL; PERINEURAL AS NEEDED
Status: DISCONTINUED | OUTPATIENT
Start: 2021-08-11 | End: 2021-08-11 | Stop reason: SURG

## 2021-08-11 RX ORDER — SODIUM CHLORIDE 9 MG/ML
500 INJECTION, SOLUTION INTRAVENOUS CONTINUOUS PRN
Status: DISCONTINUED | OUTPATIENT
Start: 2021-08-11 | End: 2021-08-11 | Stop reason: HOSPADM

## 2021-08-11 RX ORDER — PROPOFOL 10 MG/ML
VIAL (ML) INTRAVENOUS AS NEEDED
Status: DISCONTINUED | OUTPATIENT
Start: 2021-08-11 | End: 2021-08-11 | Stop reason: SURG

## 2021-08-11 RX ORDER — SODIUM CHLORIDE 0.9 % (FLUSH) 0.9 %
10 SYRINGE (ML) INJECTION AS NEEDED
Status: DISCONTINUED | OUTPATIENT
Start: 2021-08-11 | End: 2021-08-11 | Stop reason: HOSPADM

## 2021-08-11 RX ORDER — LIDOCAINE HYDROCHLORIDE 10 MG/ML
0.5 INJECTION, SOLUTION EPIDURAL; INFILTRATION; INTRACAUDAL; PERINEURAL ONCE AS NEEDED
Status: CANCELLED | OUTPATIENT
Start: 2021-08-11

## 2021-08-11 RX ADMIN — PROPOFOL 150 MG: 10 INJECTION, EMULSION INTRAVENOUS at 08:57

## 2021-08-11 RX ADMIN — SODIUM CHLORIDE 500 ML: 9 INJECTION, SOLUTION INTRAVENOUS at 08:43

## 2021-08-11 RX ADMIN — LIDOCAINE HYDROCHLORIDE 100 MG: 20 INJECTION, SOLUTION EPIDURAL; INFILTRATION; INTRACAUDAL; PERINEURAL at 08:57

## 2021-08-11 NOTE — ANESTHESIA POSTPROCEDURE EVALUATION
"Patient: Wayne Linares    Procedure Summary     Date: 08/11/21 Room / Location: [facility] ENDOSCOPY 5 / [facility] ENDOSCOPY    Anesthesia Start: 0855 Anesthesia Stop: 0904    Procedure: ESOPHAGOGASTRODUODENOSCOPY WITH ANESTHESIA (N/A ) Diagnosis:       Diarrhea, unspecified type      (Diarrhea, unspecified type [R19.7])    Surgeons: Shivam Brar DO Provider: Nette Sun CRNA    Anesthesia Type: MAC ASA Status: 3          Anesthesia Type: MAC    Vitals  Vitals Value Taken Time   /75 08/11/21 0920   Temp     Pulse 60 08/11/21 0925   Resp 17 08/11/21 0915   SpO2 97 % 08/11/21 0925   Vitals shown include unvalidated device data.        Post Anesthesia Care and Evaluation    Patient location during evaluation: bedside  Patient participation: complete - patient participated  Level of consciousness: awake and awake and alert  Pain score: 0  Pain management: adequate  Airway patency: patent  Anesthetic complications: No anesthetic complications  PONV Status: none  Cardiovascular status: acceptable  Respiratory status: acceptable  Hydration status: acceptable    Comments: Patient discharged according to acceptable Gamaliel score per RN assessment. See nursing records for further information.     Blood pressure 110/71, pulse 61, temperature 97 °F (36.1 °C), temperature source Temporal, resp. rate 17, height 188 cm (74\"), weight 121 kg (267 lb), SpO2 92 %.        "

## 2021-08-11 NOTE — ANESTHESIA PREPROCEDURE EVALUATION
Anesthesia Evaluation     Patient summary reviewed and Nursing notes reviewed   no history of anesthetic complications:  NPO Solid Status: > 8 hours  NPO Liquid Status: > 8 hours           Airway   Mallampati: II  TM distance: >3 FB  Neck ROM: full  Dental - normal exam     Pulmonary    (+) COPD mild, sleep apnea on CPAP,   Cardiovascular   Exercise tolerance: good (4-7 METS)    (+) hypertension, CAD (LAD 60%), hyperlipidemia,   (-) angina      Neuro/Psych- negative ROS  GI/Hepatic/Renal/Endo    (+) obesity,  GERD,  diabetes mellitus,   (-) liver disease, no renal disease    Musculoskeletal     Abdominal    Substance History      OB/GYN          Other                        Anesthesia Plan    ASA 3     MAC     intravenous induction     Anesthetic plan, all risks, benefits, and alternatives have been provided, discussed and informed consent has been obtained with: patient.

## 2021-08-12 ENCOUNTER — TELEPHONE (OUTPATIENT)
Dept: GASTROENTEROLOGY | Facility: CLINIC | Age: 71
End: 2021-08-12

## 2021-08-12 LAB
CYTO UR: NORMAL
LAB AP CASE REPORT: NORMAL
PATH REPORT.FINAL DX SPEC: NORMAL
PATH REPORT.GROSS SPEC: NORMAL

## 2021-08-12 NOTE — TELEPHONE ENCOUNTER
----- Message from Shivam Brar DO sent at 8/12/2021  1:19 PM CDT -----  Please let patient know small bowel biopsy was negative     Thank you      ----- Message -----  From: Lab, Background User  Sent: 8/12/2021   1:06 PM CDT  To: Shivam Brar DO

## 2021-08-30 RX ORDER — PROPRANOLOL HYDROCHLORIDE 120 MG/1
CAPSULE, EXTENDED RELEASE ORAL
Qty: 90 CAPSULE | Refills: 3 | Status: SHIPPED | OUTPATIENT
Start: 2021-08-30 | End: 2022-07-05

## 2021-09-07 ENCOUNTER — OFFICE VISIT (OUTPATIENT)
Dept: NEUROLOGY | Age: 71
End: 2021-09-07
Payer: MEDICARE

## 2021-09-07 VITALS
BODY MASS INDEX: 34.91 KG/M2 | HEART RATE: 66 BPM | OXYGEN SATURATION: 98 % | SYSTOLIC BLOOD PRESSURE: 108 MMHG | DIASTOLIC BLOOD PRESSURE: 73 MMHG | HEIGHT: 74 IN | WEIGHT: 272 LBS

## 2021-09-07 DIAGNOSIS — G25.0 ESSENTIAL TREMOR: ICD-10-CM

## 2021-09-07 DIAGNOSIS — G47.33 SLEEP APNEA, OBSTRUCTIVE: Primary | ICD-10-CM

## 2021-09-07 PROCEDURE — 4040F PNEUMOC VAC/ADMIN/RCVD: CPT | Performed by: PSYCHIATRY & NEUROLOGY

## 2021-09-07 PROCEDURE — G8417 CALC BMI ABV UP PARAM F/U: HCPCS | Performed by: PSYCHIATRY & NEUROLOGY

## 2021-09-07 PROCEDURE — 1036F TOBACCO NON-USER: CPT | Performed by: PSYCHIATRY & NEUROLOGY

## 2021-09-07 PROCEDURE — 3017F COLORECTAL CA SCREEN DOC REV: CPT | Performed by: PSYCHIATRY & NEUROLOGY

## 2021-09-07 PROCEDURE — G8427 DOCREV CUR MEDS BY ELIG CLIN: HCPCS | Performed by: PSYCHIATRY & NEUROLOGY

## 2021-09-07 PROCEDURE — 99213 OFFICE O/P EST LOW 20 MIN: CPT | Performed by: PSYCHIATRY & NEUROLOGY

## 2021-09-07 PROCEDURE — 1123F ACP DISCUSS/DSCN MKR DOCD: CPT | Performed by: PSYCHIATRY & NEUROLOGY

## 2021-09-07 NOTE — PROGRESS NOTES
[facility]  [address] [place] [address] Drive, [address] [address]  Griselda Granados  Phone [phone]  Fax [phone]     [facility] Follow Up Encounter  21 10:29 AM CDT    Information:   Patient Name: Jayshree Haines  :   1950  Age:   80 y.o. MRN:   [id number]  Account #:  [de-identified]  Today:  21    Provider: Primo Marrero M.D. Chief Complaint:   Chief Complaint   Patient presents with    Sleep Apnea     1 year follow up        Subjective:   Jayshree Haines is a 80 y.o. man with a history of obstructive sleep apnea and essentia tremor who is following up. He uses his CPAP nightly. He rests well. His machine is only a couple years old. His tremor is worse in the am.  He has had some balance difficulties. He has had some word finding difficulties. [facility] has tried primidone, gabapentin, and Valium.  He cannot take Topamax due to kidney stones. He has not yet had the DBS but has seen providers at [address] [place] [address] and expects to proceed with that when COVID 19 improves.       Objective:     Past Medical History:  Past Medical History:   Diagnosis Date    Allergic rhinitis     Clark esophagus     Chronic fatigue syndrome     COPD (chronic obstructive pulmonary disease) (HCC)     Coronary atherosclerosis due to calcified coronary lesion of native artery     Gastritis     GERD (gastroesophageal reflux disease)     Glucose intolerance (impaired glucose tolerance)     History of knee sprain     Hyperlipidemia     Hypertension     Kidney stones     Non-insulin dependent type 2 diabetes mellitus ([facility] [address]     Obesity     ELROY on CPAP     Tremor        Past Surgical History:   Procedure Laterality Date    BARIATRIC SURGERY  2005    CARDIAC CATHETERIZATION  1998    no blockages    COLONOSCOPY  2006    ELBOW SURGERY Right 2013    TOTAL KNEE ARTHROPLASTY Right 2013    UPPER GASTROINTESTINAL ENDOSCOPY  2002    UPPER GASTROINTESTINAL ENDOSCOPY  2012    UPPER GASTROINTESTINAL ENDOSCOPY         Recent Hospitalizations  · None    Significant Injuries  · None    Habits  Maxine Kidd reports that he has never smoked. He has never used smokeless tobacco. He reports current alcohol use of about 6.0 standard drinks of alcohol per week. He reports that he does not use drugs. Family History   Problem Relation Age of Onset    Stroke Mother     Cancer Mother     Heart Attack Father     Heart Disease Father     Arthritis Sister     Cancer Brother         BLADDER    No Known Problems Maternal Grandmother     No Known Problems Maternal Grandfather     Heart Disease Paternal Grandfather     No Known Problems Sister        Social History  Diana Apple is , lives in [place], [place], and is retired. Medications:  Current Outpatient Medications   Medication Sig Dispense Refill    propranolol (INDERAL LA) 120 MG extended release capsule TAKE 1 CAPSULE DAILY 90 capsule 3    ketoconazole (NIZORAL) 2 % cream Apply topically daily Apply topically daily.  nitroGLYCERIN (NITROSTAT) 0.4 MG SL tablet up to max of 3 total doses.  If no relief after 1 dose, call 911. 25 tablet 3    dapagliflozin (FARXIGA) 5 MG tablet Take 10 mg by mouth daily       Semaglutide, 1 MG/DOSE, 2 MG/1.5ML SOPN Inject into the skin once a week      pravastatin (PRAVACHOL) 20 MG tablet TAKE 1 TABLET BY MOUTH DAILY 90 tablet 3    isosorbide mononitrate (IMDUR) 30 MG extended release tablet Take 1 tablet by mouth daily 30 tablet 5    budesonide-formoterol (SYMBICORT) 160-4.5 MCG/ACT AERO Inhale 2 puffs into the lungs 2 times daily      aclidinium (TUDORZA PRESSAIR) 400 MCG/ACT AEPB inhaler Inhale 1 puff into the lungs 2 times daily      aspirin 81 MG tablet Take 81 mg by mouth daily      albuterol sulfate HFA (PROAIR HFA) 108 (90 BASE) MCG/ACT inhaler Inhale 2 puffs into the lungs every 6 hours as needed for Wheezing      azelastine HCl 0.15 % SOLN by Nasal route      fluticasone (FLONASE) 50 MCG/ACT nasal spray 1 spray by Nasal route daily      lansoprazole (PREVACID) 30 MG delayed release capsule Take 30 mg by mouth daily      vitamin D (ERGOCALCIFEROL) 24948 UNITS CAPS capsule Take 50,000 Units by mouth once a week       ferrous sulfate 325 (65 FE) MG tablet Take 325 mg by mouth daily (with breakfast)       FLUoxetine (PROZAC) 20 MG capsule Take 20 mg by mouth daily       hydrochlorothiazide (HYDRODIURIL) 12.5 MG tablet Take 12.5 mg by mouth daily       levocetirizine (XYZAL) 5 MG tablet Take 5 mg by mouth nightly       timolol (TIMOPTIC-XE) 0.5 % ophthalmic gel-forming Place 1 drop into both eyes nightly        No current facility-administered medications for this visit. Allergies:   Allergies as of 09/07/2021 - Fully Reviewed 09/07/2021   Allergen Reaction Noted    Iodine Anaphylaxis 12/07/2016    Shellfish allergy Swelling 01/05/2021    Pcn [penicillins]  12/07/2016    Sulfa antibiotics  12/07/2016       Review of Systems:  Constitutional: negative for - chills and fever  Eyes:  negative for - visual disturbance and photophobia  HENMT: negative for - headaches and sinus pain  Respiratory: negative for - cough, hemoptysis, and shortness of breath  Cardiovascular: negative for - chest pain and palpitations  Gastrointestinal: negative for - blood in stools, constipation, diarrhea, nausea, and vomiting  Genito-Urinary: negative for - hematuria, urinary frequency, urinary urgency, and urinary retention  Musculoskeletal: negative for - joint pain, joint stiffness, and joint swelling  Hematological and Lymphatic: negative for - bleeding problems, abnormal bruising, and swollen lymph nodes  Endocrine:  negative for - polydipsia and polyphagia  Allergy/Immunology:  negative for - rhinorrhea, sinus congestion, hives  Integument:  negative for - negative for - rash, change in moles, new or changing lesions  Psychological: negative for - anxiety and depression  Neurological: negative for - memory loss, numbness/tingling, and weakness     PHYSICAL EXAMINATION:  Vitals:  /73 (Site: Left Upper Arm, Position: Sitting, Cuff Size: Medium Adult)   Pulse 66   Ht 6' 2\" (1.88 m)   Wt 272 lb (123.4 kg)   SpO2 98%   BMI 34.92 kg/m²   General appearance:  Alert, well developed, well nourished, in no distress  HEENT:  normocephalic, atraumatic, sclera appear normal, no nasal abnormalities, no rhinorrhea, Ears appear normal, oral mucous membranes are moist without erythema, trachea midline, thyroid is normal, no lymphadenopathy or neck mass. Cardiovascular:  Regular rate and rhythm without murmer. No peripheral edema, No cyanosis or clubbing. No carotid bruits. Pulmonary:  Lungs are clear to auscultation. Breathing appears normal, good expansion, normal effort without use of accessory muscles  Musculoskeletal:  Joints are arthritic  Integument:  No rash, erythema, or pallor  Psychiatric:  Mood, affect, and behavior appear normal      NEUROLOGIC EXAMINATION:  Mental Status:  alert, oriented to person, place, and time. Speech:  Clear without dysarthria or dysphonia  Language:  Fluent without aphasia  Cranial Nerves:   II Visual fields are full to confrontation   III,IV, VI Extraocular movements are full   VII Facial movements are symmetrical without weakness   VIII Hearing is intact   IX,X Shoulder shrug and head rotation strength are intact   XII No tongue atrophy or fasciculations. Normal tongue protrusion. No tongue weakness  Motor:  Normal strength in both upper and lower extremities. Normal muscle tone and bulk. Deep tendon reflexes are intact and symmetrical in both upper and lower extremities. Causey's signs are absent bilaterally. There is no ankle clonus on either side. Sensation:  Sensation is intact to light touch, temperature, and vibration in all extremities. Coordination:  Rapid alternating movements are normal in both upper and lower extremities.   Finger to nose testing is unimpaired bilaterally. Gait:  Normal station and gait. Pertinent Diagnostic Studies:  CPAP download shows that he is 100% compliant with CPAP at 11cm with residual AHI of 3.0. Assessment:       ICD-10-CM    1. Sleep apnea, obstructive  G47.33    2. Essential tremor  G25.0    He is objectively compliant with and clinically benefiting from CPAP use. He has a [employer] machine. I discussed the recall with him and advised him to register his machine at [facility] and get filter from [facility]. Plan:   1. Continue CPAP use during sleep  2.  DBS for tremor at [facility] at some point  3. FU in a year    Electronically signed by Vee Duran MD on 9/7/21

## 2021-10-04 ENCOUNTER — TELEPHONE (OUTPATIENT)
Dept: CARDIOLOGY CLINIC | Age: 71
End: 2021-10-04

## 2021-10-04 NOTE — TELEPHONE ENCOUNTER
Date: TBD    Cardiologist: Dr. Kna Cruz    Procedure: Ocular surgery with Mac anesthesia    Surgeon: Dr. Shan Palafox     Last Office Visit: 4-9-21    Reason for office visit and medical concerns addressed at this office visit: CAD, HTN, DM, COPD, hyperlipidemia    Testing Performed and Date of Service:  Stress test 3-5-21  Heart cath 1-3-20    RCRI = 1pt, low, 0.9%   METs 4    Current Medications: inderal, nizoral, nitro, farxiga, semaglutide, pravachol, imdur, symbicort, ASA, albuterol, flonase, prevacid, vit d, Fe, prozac, HCTZ, xyzal, timolol    Is the patient currently taking an anticoagulant? If so, what is the diagnosis the patient has been given to warrant the need for the anticoagulant?  ASA    Additional Notes: Cardiac Risk Request

## 2021-10-05 NOTE — TELEPHONE ENCOUNTER
Proceed with low risk ocular surgery as clinically indicated. May hold aspirin if absolutely necessary.

## 2021-11-22 NOTE — PROGRESS NOTES
4 Eyes Skin Assessment    Filomena Mcburney is being assessed upon: Admission    I agree that Dominique Coelho, along with Eliana RN (either 2 RN's or 1 LPN and 1 RN) have performed a thorough Head to Toe Skin Assessment on the patient. ALL assessment sites listed below have been assessed. Areas assessed by both nurses:     [x]   Head, Face, and Ears   [x]   Shoulders, Back, and Chest  [x]   Arms, Elbows, and Hands   [x]   Coccyx, Sacrum, and Ischium  [x]   Legs, Feet, and Heels    Does the Patient have Skin Breakdown?  No    Yuri Prevention initiated: No  Wound Care Orders initiated: No    [facility] nurse consulted for Pressure Injury (Stage 3,4, Unstageable, DTI, NWPT, and Complex wounds) and New or Established Ostomies: No        Primary Nurse eSignature: Kathy Rodriguez RN on 1/3/2020 at 2:40 PM      Co-Signer eSignature: Electronically signed by Scarlett Cortés RN on 1/3/20 at 3:14 PM
[facility]  Daily Progress Note  Miguel Flores  MRN: [id number] LOS: 2    Admit Date: 1/3/2020   1/6/2020 7:36 AM        Subjective:          Chief Complaint:    The patient is a 77 y.o. male who presents as direct admit from the office w Bobby Watters during stress test on 1-2-19    Interval History:    Reviewed overnight events and nursing notes.    Status:  No pain   Pain:  no pain      NO C/O    PMH:  Past Medical History:   Diagnosis Date    Allergic rhinitis     Clark esophagus     Chronic fatigue syndrome     COPD (chronic obstructive pulmonary disease) (HCC)     Coronary atherosclerosis due to calcified coronary lesion of native artery     Gastritis     GERD (gastroesophageal reflux disease)     Glucose intolerance (impaired glucose tolerance)     History of knee sprain     Hyperlipidemia     Hypertension     Kidney stones     Non-insulin dependent type 2 diabetes mellitus (HCC)     Obesity     ELROY on CPAP     Tremor        ROS:  10 point ROS obtained:   Gen: No fevers  HEENT: No migraine or visual change  Lung: No cough, hemopotysis or wheezing  Cv: No chest pain or pressure  Abd: No nausea or vomit, no change in bowel fct, no gi bleeding  Ext: No inc pain or swelling  Neuro: No seizure or syncope  Skin: No new rashes  Endo: No polyuria, polyphagia or poilydypsia  Psyc: No inc anxiety or depression  MS: No increase in joint pain or swelling reported    DIET:  Diet NPO Effective Now    Medications:      dextrose        enoxaparin  40 mg Subcutaneous Daily    azelastine HCl  1 spray Nasal BID    vitamin D  50,000 Units Oral Weekly    linagliptin  5 mg Oral Daily    insulin lispro  0-6 Units Subcutaneous TID WC    insulin lispro  0-3 Units Subcutaneous Nightly    budesonide-formoterol  2 puff Inhalation BID    aclidinium  1 puff Inhalation BID    aspirin  81 mg Oral Daily    ferrous sulfate  325 mg Oral Daily with breakfast    FLUoxetine  20 mg Oral Daily   
Radha Dial arrived to room # 0681 [id number]. Presented with: VT  Mental Status: Patient is oriented and alert. There were no vitals filed for this visit. Patient safety contract and falls prevention contract reviewed with patient No.  Oriented Patient and Family to room. Call light within reach. Yes.   Needs, issues or concerns expressed at this time: no.      Electronically signed by Laura Rodrigues RN on 1/3/2020 at 2:39 PM
Physically abused: Not on file     Forced sexual activity: Not on file   Other Topics Concern    Not on file   Social History Narrative    Not on file       Labs:  CBC:   Recent Labs     20  1659   WBC 10.7   HGB 13.1*        BMP:    Recent Labs     20  1659   *   K 4.3   CL 96*   CO2 26   BUN 13   CREATININE 0.9   GLUCOSE 146*     Hepatic:   Recent Labs     20  1659   AST 14   ALT 16   BILITOT 0.4   ALKPHOS 60     Troponin T:   Recent Labs     20  0843 20  1549 20  2339   TROPONINI <0.01 <0.01 <0.01     Pro-BNP: No results for input(s): BNP in the last 72 hours. Lipids: No results for input(s): CHOL, HDL in the last 72 hours. Invalid input(s): LDLCALCU      Radiology:  No results found.       Objective:     Vitals: /67   Pulse (!) 48   Temp 97.1 °F (36.2 °C) (Temporal)   Resp 20   Ht 6' 2.5\" (1.892 m)   Wt 299 lb 4.8 oz (135.8 kg)   SpO2 98%   BMI 37.91 kg/m²      Intake/Output Summary (Last 24 hours) at 2020 0744  Last data filed at 2020 0440  Gross per 24 hour   Intake 1020 ml   Output 2200 ml   Net -1180 ml    Temp (24hrs), Av.5 °F (36.4 °C), Min:97.1 °F (36.2 °C), Max:98.7 °F (37.1 °C)    Glucose:  Recent Labs     20  1109 20  1559 20  2046 20  0656   POCGLU 259* 234* 204* 170*     Physical Examination:   General appearance - alert, well appearing, and in no distress and oriented to person, place, and time  Mental status - alert, oriented to person, place, and time, normal mood, behavior, speech, dress, motor activity, and thought processes  Eyes - pupils equal and reactive, extraocular eye movements intact  Ears - bilateral TM's and external ear canals normal, hearing grossly normal bilaterally  Mouth - mucous membranes moist, pharynx normal without lesions  Neck - supple, no significant adenopathy  Lymphatics - no palpable lymphadenopathy, no hepatosplenomegaly  Chest - clear to auscultation, no wheezes,
The patient is a 88y Female complaining of abnormal lab result.

## 2022-01-12 ENCOUNTER — OFFICE VISIT (OUTPATIENT)
Dept: CARDIOLOGY CLINIC | Age: 72
End: 2022-01-12
Payer: MEDICARE

## 2022-01-12 VITALS
HEIGHT: 74 IN | HEART RATE: 68 BPM | SYSTOLIC BLOOD PRESSURE: 112 MMHG | DIASTOLIC BLOOD PRESSURE: 80 MMHG | BODY MASS INDEX: 34.39 KG/M2 | WEIGHT: 268 LBS

## 2022-01-12 DIAGNOSIS — I25.10 CORONARY ARTERY DISEASE INVOLVING NATIVE CORONARY ARTERY OF NATIVE HEART WITHOUT ANGINA PECTORIS: Primary | ICD-10-CM

## 2022-01-12 PROCEDURE — 3017F COLORECTAL CA SCREEN DOC REV: CPT | Performed by: INTERNAL MEDICINE

## 2022-01-12 PROCEDURE — 99214 OFFICE O/P EST MOD 30 MIN: CPT | Performed by: INTERNAL MEDICINE

## 2022-01-12 PROCEDURE — 1036F TOBACCO NON-USER: CPT | Performed by: INTERNAL MEDICINE

## 2022-01-12 PROCEDURE — 4040F PNEUMOC VAC/ADMIN/RCVD: CPT | Performed by: INTERNAL MEDICINE

## 2022-01-12 PROCEDURE — 1123F ACP DISCUSS/DSCN MKR DOCD: CPT | Performed by: INTERNAL MEDICINE

## 2022-01-12 PROCEDURE — G8427 DOCREV CUR MEDS BY ELIG CLIN: HCPCS | Performed by: INTERNAL MEDICINE

## 2022-01-12 PROCEDURE — G8484 FLU IMMUNIZE NO ADMIN: HCPCS | Performed by: INTERNAL MEDICINE

## 2022-01-12 PROCEDURE — G8417 CALC BMI ABV UP PARAM F/U: HCPCS | Performed by: INTERNAL MEDICINE

## 2022-01-12 RX ORDER — LORATADINE 10 MG/1
10 CAPSULE, LIQUID FILLED ORAL DAILY
COMMUNITY

## 2022-01-12 ASSESSMENT — ENCOUNTER SYMPTOMS
DIARRHEA: 0
COUGH: 0
ABDOMINAL DISTENTION: 0
SHORTNESS OF BREATH: 0
WHEEZING: 0
BACK PAIN: 0
VOMITING: 0
ABDOMINAL PAIN: 0
BLOOD IN STOOL: 0

## 2022-01-12 NOTE — PROGRESS NOTES
[id number] [facility]  Cardiology Office Note  [place] [address]  Phone: [phone]  Fax: [phone]                            Date:  1/12/2022  Patient: April Mcmanus  Age:  80 y.o., 1950    Referral: No ref. provider found      PROBLEM LIST:    Patient Active Problem List    Diagnosis Date Noted    Syncope      Priority: High    Chest pain 03/05/2021     Priority: Low    Ventricular tachyarrhythmia ([address] 01/04/2020     Priority: Low    V-tach ([address] 01/03/2020     Priority: Low    Right ventricular enlargement 01/28/2019     Priority: Low    Biatrial enlargement 01/28/2019     Priority: Low    Type 2 diabetes mellitus with complication, without long-term current use of insulin ([address] 01/28/2019     Priority: Low    Sleep apnea, obstructive 09/21/2017     Priority: Low    Coronary atherosclerosis due to calcified coronary lesion of native artery      Priority: Low    Hypertension      Priority: Low    Class 2 obesity due to excess calories without serious comorbidity with body mass index (BMI) of 38.0 to 38.9 in adult      Priority: Low    ELROY on CPAP      Priority: Low     1. Coronary artery disease, abnormal dobutamine stress echo with NSVT, cardiac catheterization 1/6/2020 with mid LAD 55% stenosis, IFR 0.92, medically managed, negative Lexiscan study 3/6/2021, normal LV ejection fraction. 2.  Non-insulin-dependent diabetes mellitus. 3.  Hypertension. 4.  Severe obesity with ELROY. PRESENTATION: Arpil Mcmanus is a 80y.o. year old male presents for follow-up evaluation. He has been doing quite well with no complaints of chest pain or shortness of breath. He still goes duck hunting. He has noticed however he has a tendency to stumble and has fallen without any injuries. His balance is not what it used to be by his account. No lightheadedness. No syncopal episodes.   He still pays attention to his diet and has maintained his weight. Eating more fish by his account. Uses the air [employer]. REVIEW OF SYSTEMS:  Review of Systems   Constitutional: Negative for activity change, diaphoresis and fatigue. HENT: Negative for hearing loss, nosebleeds and tinnitus. Eyes: Negative for visual disturbance. Respiratory: Negative for cough, shortness of breath and wheezing. Cardiovascular: Negative for chest pain, palpitations and leg swelling. Gastrointestinal: Negative for abdominal distention, abdominal pain, blood in stool, diarrhea and vomiting. Endocrine: Negative for cold intolerance, heat intolerance, polydipsia, polyphagia and polyuria. Genitourinary: Negative for difficulty urinating, flank pain and hematuria. Musculoskeletal: Negative for arthralgias, back pain, joint swelling and myalgias. Skin: Negative for pallor and rash. Neurological: Negative for dizziness, seizures, syncope and headaches. Psychiatric/Behavioral: Negative for behavioral problems and dysphoric mood. The patient is not nervous/anxious.         Past Medical History:      Diagnosis Date    Allergic rhinitis     Clark esophagus     Chronic fatigue syndrome     COPD (chronic obstructive pulmonary disease) (HCC)     Coronary atherosclerosis due to calcified coronary lesion of native artery     Gastritis     GERD (gastroesophageal reflux disease)     Glucose intolerance (impaired glucose tolerance)     History of knee sprain     Hyperlipidemia     Hypertension     Kidney stones     Non-insulin dependent type 2 diabetes mellitus ([facility]     Obesity     ELROY on CPAP     Tremor        Past Surgical History:      Procedure Laterality Date    BARIATRIC SURGERY  02/09/2005    CARDIAC CATHETERIZATION  1998    no blockages    CATARACT REMOVAL WITH IMPLANT Bilateral 12/2021    COLONOSCOPY  05/22/2006    ELBOW SURGERY Right 08/21/2013    TOTAL KNEE ARTHROPLASTY Right 03/18/2013    UPPER GASTROINTESTINAL ENDOSCOPY  2002    UPPER GASTROINTESTINAL ENDOSCOPY  04/19/2012    UPPER GASTROINTESTINAL ENDOSCOPY         Medications:  Current Outpatient Medications   Medication Sig Dispense Refill    loratadine (CLARITIN) 10 MG capsule Take 10 mg by mouth daily      propranolol (INDERAL LA) 120 MG extended release capsule TAKE 1 CAPSULE DAILY 90 capsule 3    ketoconazole (NIZORAL) 2 % cream Apply topically daily Apply topically daily.  nitroGLYCERIN (NITROSTAT) 0.4 MG SL tablet up to max of 3 total doses.  If no relief after 1 dose, call 911. 25 tablet 3    dapagliflozin (FARXIGA) 5 MG tablet Take 10 mg by mouth daily       Semaglutide, 1 MG/DOSE, 2 MG/1.5ML SOPN Inject into the skin once a week      pravastatin (PRAVACHOL) 20 MG tablet TAKE 1 TABLET BY MOUTH DAILY 90 tablet 3    isosorbide mononitrate (IMDUR) 30 MG extended release tablet Take 1 tablet by mouth daily 30 tablet 5    budesonide-formoterol (SYMBICORT) 160-4.5 MCG/ACT AERO Inhale 2 puffs into the lungs 2 times daily      aclidinium (TUDORZA PRESSAIR) 400 MCG/ACT AEPB inhaler Inhale 1 puff into the lungs 2 times daily      aspirin 81 MG tablet Take 81 mg by mouth daily      albuterol sulfate HFA (PROAIR HFA) 108 (90 BASE) MCG/ACT inhaler Inhale 2 puffs into the lungs every 6 hours as needed for Wheezing      azelastine HCl 0.15 % SOLN by Nasal route      fluticasone (FLONASE) 50 MCG/ACT nasal spray 1 spray by Nasal route daily      lansoprazole (PREVACID) 30 MG delayed release capsule Take 30 mg by mouth daily      vitamin D (ERGOCALCIFEROL) 07779 UNITS CAPS capsule Take 50,000 Units by mouth once a week       ferrous sulfate 325 (65 FE) MG tablet Take 325 mg by mouth daily (with breakfast)       FLUoxetine (PROZAC) 20 MG capsule Take 20 mg by mouth daily       hydrochlorothiazide (HYDRODIURIL) 12.5 MG tablet Take 12.5 mg by mouth daily       levocetirizine (XYZAL) 5 MG tablet Take 5 mg by mouth nightly  (Patient not taking: Reported on 1/12/2022)      timolol (TIMOPTIC-XE) 0.5 % ophthalmic gel-forming Place 1 drop into both eyes nightly  (Patient not taking: Reported on 1/12/2022)       No current facility-administered medications for this visit. Allergies:  Iodine, Shellfish allergy, Pcn [penicillins], and Sulfa antibiotics    Past Social History:  Social History     Socioeconomic History    Marital status:      Spouse name: Not on file    Number of children: Not on file    Years of education: Not on file    Highest education level: Not on file   Occupational History    Not on file   Tobacco Use    Smoking status: Never Smoker    Smokeless tobacco: Never Used   Vaping Use    Vaping Use: Never used   Substance and Sexual Activity    Alcohol use: Not Currently    Drug use: No    Sexual activity: Yes   Other Topics Concern    Not on file   Social History Narrative    Not on file     Social Determinants of Health     Financial Resource Strain:     Difficulty of Paying Living Expenses: Not on file   Food Insecurity:     Worried About Running Out of Food in the Last Year: Not on file    Jennifer of Food in the Last Year: Not on file   Transportation Needs:     Lack of Transportation (Medical): Not on file    Lack of Transportation (Non-Medical):  Not on file   Physical Activity:     Days of Exercise per Week: Not on file    Minutes of Exercise per Session: Not on file   Stress:     Feeling of Stress : Not on file   Social Connections:     Frequency of Communication with Friends and Family: Not on file    Frequency of Social Gatherings with Friends and Family: Not on file    Attends Religion Services: Not on file    Active Member of Clubs or Organizations: Not on file    Attends Club or Organization Meetings: Not on file    Marital Status: Not on file   Intimate Partner Violence:     Fear of Current or Ex-Partner: Not on file    Emotionally Abused: Not on file    Physically Abused: Not on file    Sexually Abused: Not on file   Housing Stability:     Unable to Pay for Housing in the Last Year: Not on file    Number of Places Lived in the Last Year: Not on file    Unstable Housing in the Last Year: Not on file       Family History:       Problem Relation Age of Onset    Stroke Mother     Cancer Mother     Heart Attack Father     Heart Disease Father     Arthritis Sister     Cancer Brother         BLADDER    No Known Problems Maternal Grandmother     No Known Problems Maternal Grandfather     Heart Disease Paternal Grandfather     No Known Problems Sister          Physical Examination:  /80   Pulse 68   Ht 6' 2\" (1.88 m)   Wt 268 lb (121.6 kg)   BMI 34.41 kg/m²   Physical Exam  Constitutional:       Appearance: He is obese. Comments: Severe truncal obesity  Blood pressure right arm sitting 110/74 mmHg, pulse 64 bpm regular   HENT:      Mouth/Throat:      Pharynx: No oropharyngeal exudate. Eyes:      General: No scleral icterus. Right eye: No discharge. Left eye: No discharge. Neck:      Thyroid: No thyromegaly. Vascular: No JVD. Cardiovascular:      Rate and Rhythm: Normal rate and regular rhythm. Heart sounds: No murmur heard. No friction rub. No gallop. Comments: No JVD  No edema  No significant systolic or diastolic murmurs noted  Pulmonary:      Effort: No respiratory distress. Breath sounds: No stridor. No wheezing or rales. Abdominal:      General: Bowel sounds are normal. There is no distension. Palpations: Abdomen is soft. There is no mass. Tenderness: There is no abdominal tenderness. There is no guarding or rebound. Comments: No palpable organomegaly   Musculoskeletal:         General: No deformity. Skin:     General: Skin is warm. Coloration: Skin is not pale. Findings: No erythema or rash. Neurological:      Mental Status: He is alert and oriented to person, place, and time. Motor: No abnormal muscle tone.       Coordination: Coordination normal.      Deep Tendon Reflexes: Reflexes normal.           Labs:   CBC: No results for input(s): WBC, HGB, HCT, PLT in the last 72 hours. BMP:No results for input(s): NA, K, CO2, BUN, CREATININE, LABGLOM, GLUCOSE in the last 72 hours. BNP: No results for input(s): BNP in the last 72 hours. PT/INR: No results for input(s): PROTIME, INR in the last 72 hours. APTT:No results for input(s): APTT in the last 72 hours. CARDIAC ENZYMES:No results for input(s): CKTOTAL, CKMB, CKMBINDEX, TROPONINI in the last 72 hours. FASTING LIPID PANEL:No results found for: HDL, LDLDIRECT, LDLCALC, TRIG  LIVER PROFILE:No results for input(s): AST, ALT, LABALBU in the last 72 hours. Imaging:          ASSESSMENT and PLAN:    56-year-old gentleman with past medical history of non-insulin-dependent diabetes mellitus, hypertension, essential tremor, coronary artery disease with prior catheterization after abnormal dobutamine stress echo with NSVT showing mid LAD 55% stenosis, nonobstructive by physiological testing, normal LV ejection fraction significantly improved on medical management here for follow-up evaluation. 1.  He has been doing quite well maintaining his weight. Blood pressure appears well controlled. Have advised him to use a cane/walking stick to avoid falls. Continue to be active. 2.  No recurrent anginal symptoms. Continue current medications. 3.  Follow-up with me in 8 months. Orders:  No orders of the defined types were placed in this encounter. No orders of the defined types were placed in this encounter. Return for F/u 8 mths. Electronically signed by Dell Bautista MD on 1/12/2022 at 2:54 PM    [facility]      Thisdictation was generated by voice recognition computer software. Although all attempts are made to edit the dictation for accuracy, there may be errors in the transcription that are not intended.

## 2022-06-27 ENCOUNTER — TELEPHONE (OUTPATIENT)
Dept: GASTROENTEROLOGY | Facility: CLINIC | Age: 72
End: 2022-06-27

## 2022-07-01 NOTE — TELEPHONE ENCOUNTER
Please obtain office visit from Dr Orellana office April 2022    I would also like previous labs - specifically cbc, iron labs

## 2022-07-05 RX ORDER — PROPRANOLOL HYDROCHLORIDE 120 MG/1
CAPSULE, EXTENDED RELEASE ORAL
Qty: 90 CAPSULE | Refills: 3 | Status: SHIPPED | OUTPATIENT
Start: 2022-07-05

## 2022-07-12 ENCOUNTER — TELEPHONE (OUTPATIENT)
Dept: GASTROENTEROLOGY | Facility: CLINIC | Age: 72
End: 2022-07-12

## 2022-07-12 RX ORDER — SODIUM, POTASSIUM,MAG SULFATES 17.5-3.13G
177 SOLUTION, RECONSTITUTED, ORAL ORAL TAKE AS DIRECTED
Qty: 177 ML | Refills: 0 | Status: SHIPPED | OUTPATIENT
Start: 2022-07-12 | End: 2022-08-10

## 2022-07-12 NOTE — TELEPHONE ENCOUNTER
COLONOSCOPY (08/01/2017 09:01)  Tissue Pathology Exam (08/01/2017 09:08)        Request entered

## 2022-07-12 NOTE — TELEPHONE ENCOUNTER
Office visit from Dr Orellana dated 4/28/22 reviewed    No lab work per note  Diagnosis of iron deficiency anemia, no labs regarding this      Ok to proceed with colonoscopy

## 2022-07-14 NOTE — TELEPHONE ENCOUNTER
Patient states no NEW GI, HEART, or LUNG problems at this time.     We verified insurance, meds, and past medical history.      Patient verbally understood instructions and stated they would have a  with them the day of procedure.    Pharmacy : [facility]    I will mail a copy of instructions and instructed patient to contact me if they have any questions.     Please put in case request.     DATE OF PROCEDURE: 08/03/2022 AT 10:45AM     Thanks.

## 2022-07-27 DIAGNOSIS — Z86.010 HISTORY OF ADENOMATOUS POLYP OF COLON: Primary | ICD-10-CM

## 2022-07-27 PROBLEM — Z86.0101 HISTORY OF ADENOMATOUS POLYP OF COLON: Status: ACTIVE | Noted: 2022-07-27

## 2022-08-03 ENCOUNTER — HOSPITAL ENCOUNTER (OUTPATIENT)
Facility: HOSPITAL | Age: 72
Setting detail: HOSPITAL OUTPATIENT SURGERY
Discharge: HOME | End: 2022-08-03
Attending: INTERNAL MEDICINE | Admitting: INTERNAL MEDICINE

## 2022-08-03 ENCOUNTER — ANESTHESIA EVENT (OUTPATIENT)
Dept: GASTROENTEROLOGY | Facility: HOSPITAL | Age: 72
End: 2022-08-03

## 2022-08-03 ENCOUNTER — ANESTHESIA (OUTPATIENT)
Dept: GASTROENTEROLOGY | Facility: HOSPITAL | Age: 72
End: 2022-08-03

## 2022-08-03 VITALS
OXYGEN SATURATION: 95 % | RESPIRATION RATE: 10 BRPM | HEIGHT: 74 IN | TEMPERATURE: 96.7 F | HEART RATE: 60 BPM | WEIGHT: 261 LBS | BODY MASS INDEX: 33.5 KG/M2 | SYSTOLIC BLOOD PRESSURE: 94 MMHG | DIASTOLIC BLOOD PRESSURE: 68 MMHG

## 2022-08-03 DIAGNOSIS — Z86.010 HISTORY OF ADENOMATOUS POLYP OF COLON: ICD-10-CM

## 2022-08-03 LAB — GLUCOSE BLDC GLUCOMTR-MCNC: 119 MG/DL (ref 70–130)

## 2022-08-03 PROCEDURE — 82962 GLUCOSE BLOOD TEST: CPT

## 2022-08-03 PROCEDURE — 25010000002 PROPOFOL 10 MG/ML EMULSION: Performed by: NURSE ANESTHETIST, CERTIFIED REGISTERED

## 2022-08-03 PROCEDURE — G0105 COLORECTAL SCRN; HI RISK IND: HCPCS | Performed by: INTERNAL MEDICINE

## 2022-08-03 RX ORDER — LIDOCAINE HYDROCHLORIDE 10 MG/ML
0.5 INJECTION, SOLUTION EPIDURAL; INFILTRATION; INTRACAUDAL; PERINEURAL ONCE AS NEEDED
Status: CANCELLED | OUTPATIENT
Start: 2022-08-03

## 2022-08-03 RX ORDER — SODIUM CHLORIDE 9 MG/ML
100 INJECTION, SOLUTION INTRAVENOUS CONTINUOUS
Status: CANCELLED | OUTPATIENT
Start: 2022-08-03

## 2022-08-03 RX ORDER — LIDOCAINE HYDROCHLORIDE 20 MG/ML
INJECTION, SOLUTION EPIDURAL; INFILTRATION; INTRACAUDAL; PERINEURAL AS NEEDED
Status: DISCONTINUED | OUTPATIENT
Start: 2022-08-03 | End: 2022-08-03 | Stop reason: SURG

## 2022-08-03 RX ORDER — SODIUM CHLORIDE 0.9 % (FLUSH) 0.9 %
10 SYRINGE (ML) INJECTION AS NEEDED
Status: CANCELLED | OUTPATIENT
Start: 2022-08-03

## 2022-08-03 RX ORDER — PROPOFOL 10 MG/ML
VIAL (ML) INTRAVENOUS AS NEEDED
Status: DISCONTINUED | OUTPATIENT
Start: 2022-08-03 | End: 2022-08-03 | Stop reason: SURG

## 2022-08-03 RX ORDER — SODIUM CHLORIDE 0.9 % (FLUSH) 0.9 %
10 SYRINGE (ML) INJECTION EVERY 12 HOURS SCHEDULED
Status: CANCELLED | OUTPATIENT
Start: 2022-08-03

## 2022-08-03 RX ORDER — SODIUM CHLORIDE 0.9 % (FLUSH) 0.9 %
10 SYRINGE (ML) INJECTION AS NEEDED
Status: DISCONTINUED | OUTPATIENT
Start: 2022-08-03 | End: 2022-08-03 | Stop reason: HOSPADM

## 2022-08-03 RX ORDER — SODIUM CHLORIDE 9 MG/ML
500 INJECTION, SOLUTION INTRAVENOUS CONTINUOUS PRN
Status: DISCONTINUED | OUTPATIENT
Start: 2022-08-03 | End: 2022-08-03 | Stop reason: HOSPADM

## 2022-08-03 RX ADMIN — LIDOCAINE HYDROCHLORIDE 50 MG: 20 INJECTION, SOLUTION EPIDURAL; INFILTRATION; INTRACAUDAL; PERINEURAL at 10:07

## 2022-08-03 RX ADMIN — SODIUM CHLORIDE 500 ML: 9 INJECTION, SOLUTION INTRAVENOUS at 09:30

## 2022-08-03 RX ADMIN — PROPOFOL 200 MG: 10 INJECTION, EMULSION INTRAVENOUS at 10:07

## 2022-08-03 NOTE — ANESTHESIA POSTPROCEDURE EVALUATION
"Patient: Wayne ZARATE    Procedure Summary     Date: 08/03/22 Room / Location: [facility] ENDOSCOPY 2 / [facility] ENDOSCOPY    Anesthesia Start: 1004 Anesthesia Stop: 1020    Procedure: COLONOSCOPY WITH ANESTHESIA (N/A ) Diagnosis:       History of adenomatous polyp of colon      (History of adenomatous polyp of colon [Z86.010])    Surgeons: Shivam Brar DO Provider: Janet Love CRNA    Anesthesia Type: MAC ASA Status: 3          Anesthesia Type: MAC    Vitals  Vitals Value Taken Time   BP 89/69 08/03/22 1026   Temp     Pulse 60 08/03/22 1033   Resp 12 08/03/22 1020   SpO2 94 % 08/03/22 1033   Vitals shown include unvalidated device data.        Post Anesthesia Care and Evaluation    Patient location during evaluation: PACU  Patient participation: complete - patient participated  Level of consciousness: awake and alert  Pain management: adequate    Airway patency: patent  Anesthetic complications: No anesthetic complications    Cardiovascular status: acceptable  Respiratory status: acceptable  Hydration status: acceptable    Comments: Blood pressure 105/92, pulse 60, temperature 96.7 °F (35.9 °C), temperature source Temporal, resp. rate 12, height 188 cm (74\"), weight 118 kg (261 lb), SpO2 92 %.    Pt discharged from PACU based on aurea score >8      "

## 2022-08-03 NOTE — ANESTHESIA PREPROCEDURE EVALUATION
Anesthesia Evaluation     Patient summary reviewed and Nursing notes reviewed   no history of anesthetic complications:  NPO Solid Status: > 8 hours  NPO Liquid Status: > 8 hours           Airway   Mallampati: II  TM distance: >3 FB  Neck ROM: full  Dental - normal exam     Pulmonary    (+) COPD mild, sleep apnea on CPAP,   Cardiovascular   Exercise tolerance: good (4-7 METS)    Patient on routine beta blocker    (+) hypertension, CAD (LAD 60%), dysrhythmias Tachycardia, hyperlipidemia,   (-) pacemaker, valvular problems/murmurs, past MI, angina, cardiac stents, CABG      Neuro/Psych- negative ROS  (-) seizures, CVA  GI/Hepatic/Renal/Endo    (+) obesity,  GERD,  diabetes mellitus,   (-) liver disease, no renal disease    Musculoskeletal     Abdominal   (+) obese,    Substance History      OB/GYN          Other   arthritis,                        Anesthesia Plan    ASA 3     MAC     intravenous induction     Anesthetic plan, risks, benefits, and alternatives have been provided, discussed and informed consent has been obtained with: patient and spouse/significant other.

## 2022-08-03 NOTE — H&P
[facility] Gastroenterology  Pre Procedure History & Physical    Chief Complaint:   Polyps    Subjective     HPI:   Polyps    Past Medical History:   Past Medical History:   Diagnosis Date   • Arthritis    • Bundle branch block, right    • COPD (chronic obstructive pulmonary disease) (HCC)    • Coronary artery disease    • Diabetes mellitus (HCC)    • Hyperlipidemia    • Hypertension    • Irregular heart beat    • Sleep apnea     uses bi-pap       Past Surgical History:  Past Surgical History:   Procedure Laterality Date   • CHOLECYSTECTOMY     • COLONOSCOPY  05/07/2014    One 95cm polyp removed.  repeat 3 years  Dr Brar   • COLONOSCOPY N/A 8/1/2017    Procedure: COLONOSCOPY WITH ANESTHESIA;  Surgeon: Shivam Brar DO;  Location: [facility] ENDOSCOPY;  Service:    • ENDOSCOPY  05/07/2014    [facility]  repeat 3 years Dr Brar   • ENDOSCOPY N/A 7/26/2017    Procedure: ESOPHAGOGASTRODUODENOSCOPY WITH ANESTHESIA;  Surgeon: Shivam Brar DO;  Location: [facility] ENDOSCOPY;  Service:    • ENDOSCOPY N/A 8/7/2020    Procedure: ESOPHAGOGASTRODUODENOSCOPY WITH ANESTHESIA;  Surgeon: Shivam Brar DO;  Location: [facility] ENDOSCOPY;  Service: Gastroenterology;  Laterality: N/A;  Pre: Reflux  Post: [facility]  Dr. Orellana  CO2 Inflation Used   • ENDOSCOPY N/A 8/11/2021    Procedure: ESOPHAGOGASTRODUODENOSCOPY WITH ANESTHESIA;  Surgeon: Shivam Brar DO;  Location: [facility] ENDOSCOPY;  Service: Gastroenterology;  Laterality: N/A;  pre: diarrhea  post: normal  Saurabh Orellana MD   • GASTRIC BYPASS     • KNEE SURGERY Right    • NERVE SURGERY      left arm   • TONSILLECTOMY         Family History:  Family History   Problem Relation Age of Onset   • Cancer Mother         breast   • Heart disease Father    • Cancer Brother         bladder   • Colon cancer Neg Hx    • Colon polyps Neg Hx    • Esophageal cancer Neg Hx    • Liver cancer Neg Hx    • Liver disease Neg Hx    • Rectal cancer Neg Hx    • Stomach cancer Neg Hx         Social History:   reports that he has never smoked. He has never used smokeless tobacco. He reports previous alcohol use. He reports that he does not use drugs.    Medications:   Prior to Admission medications    Medication Sig Start Date End Date Taking? Authorizing Provider   aclidinium bromide (TUDORZA PRESSAIR) 400 MCG/ACT aerosol powder  powder for inhalation Inhale 1 puff 2 (Two) Times a Day. 7/29/20  Yes Argenis Lopez APRN   albuterol (PROVENTIL HFA;VENTOLIN HFA) 108 (90 BASE) MCG/ACT inhaler Inhale.   Yes Provider, Historical, MD   famotidine (PEPCID) 10 MG tablet Take 10 mg by mouth 2 (Two) Times a Day.   Yes Provider, MD Hillary   Farxiga 5 MG tablet tablet  1/29/21  Yes Provider, MD Hillary   ferrous sulfate 325 (65 FE) MG tablet Take  by mouth. 10/4/16  Yes Provider, MD Hillary   FLUoxetine (PROzac) 20 MG capsule TAKE 1 CAPSULE BY MOUTH DAILY IN THE MORNING 5/25/17  Yes Provider, Historical, MD   hydrochlorothiazide (HYDRODIURIL) 12.5 MG tablet  6/28/17  Yes Provider, Historical, MD   isosorbide mononitrate (IMDUR) 30 MG 24 hr tablet  11/19/20  Yes Provider, MD Hillary   lansoprazole (PREVACID) 30 MG capsule  6/1/17  Yes Provider, MD Hillary   levocetirizine (XYZAL) 5 MG tablet Take  by mouth. 11/14/16  Yes Provider, MD Hillary   pravastatin (PRAVACHOL) 10 MG tablet Take 10 mg by mouth Daily.   Yes Provider, Historical, MD   propranolol LA (INDERAL LA) 120 MG 24 hr capsule  6/27/17  Yes Provider, Historical, MD   sodium-potassium-magnesium sulfates (Suprep Bowel Prep Kit) 17.5-3.13-1.6 GM/177ML solution oral solution Take 1 bottle by mouth Take As Directed. Take as directed 7/12/22  Yes Jameel Golden APRN   SYMBICORT 160-4.5 MCG/ACT inhaler Inhale 2 puffs 2 (Two) Times a Day. 7/29/20  Yes Argenis Lopez APRN   vitamin D (ERGOCALCIFEROL) 99247 UNITS capsule capsule  6/27/17  Yes Provider, Historical, MD   aspirin 81 MG tablet Take  by mouth.     "Hillary Serrano MD   EPINEPHrine (EPIPEN) 0.3 MG/0.3ML solution auto-injector injection Inject  into the shoulder, thigh, or buttocks.    Hillary Serrano MD   Omega-3 Fatty Acids (fish oil) 1000 MG capsule capsule Take  by mouth Daily With Breakfast.    Hillary Serrano MD   Semaglutide (OZEMPIC, 1 MG/DOSE, SC) Inject  under the skin into the appropriate area as directed 1 (One) Time Per Week.    Hillary Serrano MD   timolol (TIMOPTIC-XR) 0.5 % ophthalmic gel-forming 1 SQUIRT IN BOTH EYES DAILY 5/27/17 8/3/22  Hillary Serrano MD       Allergies:  Iodides, Shellfish-derived products, Penicillins, and Sulfa antibiotics    ROS:    General: Weight stable  Resp: No SOA  Cardiovascular: No CP    Objective     Blood pressure 112/68, pulse 60, temperature 96.7 °F (35.9 °C), temperature source Temporal, resp. rate 20, height 188 cm (74\"), weight 118 kg (261 lb), SpO2 94 %.    Physical Exam   Constitutional: Pt is oriented to person, place, and in no distress.   HENT: Mouth/Throat: Oropharynx is clear.   Cardiovascular: Normal rate, regular rhythm.    Pulmonary/Chest: Effort normal. No respiratory distress. No  wheezes.   Abdominal: Soft. Non-distended.  Skin: Skin is warm and dry.   Psychiatric: Mood, memory, affect and judgment appear normal.     Assessment & Plan     Diagnosis:  Polyps    Anticipated Surgical Procedure:  c-scope    The risks, benefits, and alternatives of this procedure have been discussed with the patient or the responsible party- the patient understands and agrees to proceed.        "

## 2022-08-04 ENCOUNTER — TELEPHONE (OUTPATIENT)
Dept: GASTROENTEROLOGY | Facility: CLINIC | Age: 72
End: 2022-08-04

## 2022-08-10 ENCOUNTER — OFFICE VISIT (OUTPATIENT)
Dept: PULMONOLOGY | Facility: CLINIC | Age: 72
End: 2022-08-10

## 2022-08-10 VITALS
OXYGEN SATURATION: 98 % | BODY MASS INDEX: 34.39 KG/M2 | WEIGHT: 268 LBS | DIASTOLIC BLOOD PRESSURE: 74 MMHG | HEIGHT: 74 IN | SYSTOLIC BLOOD PRESSURE: 122 MMHG | HEART RATE: 66 BPM

## 2022-08-10 DIAGNOSIS — Z79.899 CURRENT USE OF BETA BLOCKER: ICD-10-CM

## 2022-08-10 DIAGNOSIS — G47.33 OBSTRUCTIVE SLEEP APNEA SYNDROME: ICD-10-CM

## 2022-08-10 DIAGNOSIS — J44.9 STAGE 2 MODERATE COPD BY GOLD CLASSIFICATION: Primary | ICD-10-CM

## 2022-08-10 PROCEDURE — 99214 OFFICE O/P EST MOD 30 MIN: CPT | Performed by: INTERNAL MEDICINE

## 2022-08-10 NOTE — PROGRESS NOTES
Background:  Patient w gold 2 copd   Chief Complaint  COPD    Subjective    History of Present Illness       Wayne Linares presents to [facility] PULMONARY & CRITICAL CARE MEDICINE.  He has been doing well other than when breathing hot humid air outside.  He uses Symbicort regularly and occasionally the tudorza.  He also takes inderal LA for many years for heart rhythm and tremor.  He is sleeping well using bipap every night.  He had is unit replaced after the recall     Tobacco Use: Low Risk    • Smoking Tobacco Use: Never Smoker   • Smokeless Tobacco Use: Never Used      Current Outpatient Medications   Medication Instructions   • aclidinium bromide (TUDORZA PRESSAIR) 400 MCG/ACT aerosol powder  powder for inhalation 1 puff, Inhalation, 2 Times Daily - RT   • albuterol (PROVENTIL HFA;VENTOLIN HFA) 108 (90 BASE) MCG/ACT inhaler Inhalation   • aspirin 81 MG tablet Oral   • EPINEPHrine (EPIPEN) 0.3 MG/0.3ML solution auto-injector injection Intramuscular   • famotidine (PEPCID) 10 mg, Oral, 2 Times Daily   • Farxiga 5 MG tablet tablet No dose, route, or frequency recorded.   • ferrous sulfate 325 (65 FE) MG tablet Oral   • FLUoxetine (PROzac) 20 MG capsule TAKE 1 CAPSULE BY MOUTH DAILY IN THE MORNING   • hydrochlorothiazide (HYDRODIURIL) 12.5 MG tablet No dose, route, or frequency recorded.   • isosorbide mononitrate (IMDUR) 30 MG 24 hr tablet No dose, route, or frequency recorded.   • lansoprazole (PREVACID) 30 MG capsule No dose, route, or frequency recorded.   • levocetirizine (XYZAL) 5 MG tablet Oral   • Omega-3 Fatty Acids (fish oil) 1000 MG capsule capsule Oral, Daily With Breakfast   • pravastatin (PRAVACHOL) 10 mg, Oral, Daily   • propranolol LA (INDERAL LA) 120 MG 24 hr capsule No dose, route, or frequency recorded.   • Semaglutide (OZEMPIC, 1 MG/DOSE, SC) Subcutaneous, Weekly   • SYMBICORT 160-4.5 MCG/ACT inhaler 2 puffs, Inhalation, 2 Times Daily - RT   • vitamin D (ERGOCALCIFEROL)  "76264 UNITS capsule capsule No dose, route, or frequency recorded.      Objective     Vital Signs:   /74   Pulse 66   Ht 188 cm (74\")   Wt 122 kg (268 lb)   SpO2 98% Comment: RA  BMI 34.41 kg/m²   Physical Exam  Constitutional:       Appearance: Normal appearance. He is not ill-appearing or diaphoretic.   Eyes:      Extraocular Movements: Extraocular movements intact.   Pulmonary:      Effort: Pulmonary effort is normal. No respiratory distress.      Breath sounds: No wheezing, rhonchi or rales.   Skin:     Findings: No erythema or rash.   Neurological:      Mental Status: He is alert and oriented to person, place, and time.      Motor: No weakness.   Psychiatric:         Mood and Affect: Mood normal.        Result Review  Data Reviewed:{ Labs  Result Review  Imaging  Media :23}          PFT Values        Some values may be hidden. Unless noted otherwise, only the newest values recorded on each date are displayed.         Old Values PFT Results 8/9/21   No data to display.      Pre Drug PFT Results 8/9/21   FVC 93   FEV1 77   FEF 25-75% 42   FEV1/FVC 65.66      Post Drug PFT Results 8/9/21   No data to display.      Other Tests PFT Results 8/9/21   No data to display.                 Stress Test, [facility] 3/2021:    EF 60%   Normal wall motion.     Assessment and Plan  {CC Problem List  Visit Diagnosis  ROS  Review (Popup)  Health Maintenance  Quality  BestPractice  Medications  SmartSets  SnapShot Encounters  Media :23}   Diagnoses and all orders for this visit:    1. Stage 2 moderate COPD by GOLD classification (HCC) (Primary)    2. Obstructive sleep apnea syndrome    3. Current use of beta blocker    COPD is stable; continue current inhalers and follow up spirometry next  Pt is compliant with cpap and benefiting.  Continue cpap for tay as is  He has tremor, and he is tolerating propranolol without apparent bronchospasm at current dose.  I counseled pt about risk for bronchospasm with beta " blocker. Recommend avoiding dose escalation.  Follow Up {Instructions Charge Capture  Follow-up Communications :23}   Return in about 6 months (around 2/10/2023) for spirometry.  Patient was given instructions and counseling regarding his condition or for health maintenance advice. Please see specific information pulled into the AVS if appropriate.    Electronically signed by Laron Sparrow MD, 8/10/2022, 16:08 CDT

## 2022-08-25 ENCOUNTER — TELEPHONE (OUTPATIENT)
Dept: PULMONOLOGY | Facility: CLINIC | Age: 72
End: 2022-08-25

## 2022-08-25 NOTE — TELEPHONE ENCOUNTER
DELFINAI:  Patient wanted Dr. Sparrow to be aware that he was Covid positive on 8/14/22. He was treated by Dr. Orellana with Azithromycin and a medrol dose claribel.  He continues taking his Tudorza, Symbicort and Albuterol nebulizer treatments.   He states he is feeling better today than he has in the past couple of weeks.

## 2022-08-29 ENCOUNTER — TELEPHONE (OUTPATIENT)
Dept: PULMONOLOGY | Facility: CLINIC | Age: 72
End: 2022-08-29

## 2022-08-29 DIAGNOSIS — J44.9 STAGE 2 MODERATE COPD BY GOLD CLASSIFICATION: ICD-10-CM

## 2022-08-29 DIAGNOSIS — R06.02 SHORTNESS OF BREATH: Primary | ICD-10-CM

## 2022-08-29 DIAGNOSIS — Z86.16 HISTORY OF 2019 NOVEL CORONAVIRUS DISEASE (COVID-19): ICD-10-CM

## 2022-08-29 NOTE — TELEPHONE ENCOUNTER
Patient states he continues to have a productive cough with clear sputum and is still not feeling well. He tested positive for Covid the middle of this month. He never had fever or chills.   He is taking his Tudorza, Symbicort and nebulizer treatments. He is also on Claritin but is not taking Mucinex.  He is asking if you think he needs to come in to be seen?  Patient is aware that it may be tomorrow before he gets a call back and he voiced understanding.  Please advise.

## 2022-08-30 ENCOUNTER — HOSPITAL ENCOUNTER (OUTPATIENT)
Dept: GENERAL RADIOLOGY | Facility: HOSPITAL | Age: 72
Discharge: HOME | End: 2022-08-30
Admitting: INTERNAL MEDICINE

## 2022-08-30 DIAGNOSIS — Z86.16 HISTORY OF 2019 NOVEL CORONAVIRUS DISEASE (COVID-19): ICD-10-CM

## 2022-08-30 DIAGNOSIS — J44.9 STAGE 2 MODERATE COPD BY GOLD CLASSIFICATION: ICD-10-CM

## 2022-08-30 DIAGNOSIS — R06.02 SHORTNESS OF BREATH: ICD-10-CM

## 2022-08-30 PROCEDURE — 71046 X-RAY EXAM CHEST 2 VIEWS: CPT

## 2022-08-30 NOTE — TELEPHONE ENCOUNTER
Patient notified and voiced understanding. He is agreeable to seeing a nurse practitioner. Routed to appointments, please call the patient to schedule him. He is aware to get a chest xray at the [facility] [place].

## 2022-09-01 ENCOUNTER — OFFICE VISIT (OUTPATIENT)
Dept: PULMONOLOGY | Facility: CLINIC | Age: 72
End: 2022-09-01

## 2022-09-01 VITALS
OXYGEN SATURATION: 98 % | DIASTOLIC BLOOD PRESSURE: 70 MMHG | HEART RATE: 68 BPM | WEIGHT: 265 LBS | SYSTOLIC BLOOD PRESSURE: 136 MMHG | BODY MASS INDEX: 34.01 KG/M2 | HEIGHT: 74 IN

## 2022-09-01 DIAGNOSIS — J44.9 STAGE 2 MODERATE COPD BY GOLD CLASSIFICATION: Primary | ICD-10-CM

## 2022-09-01 DIAGNOSIS — Z86.16 HISTORY OF 2019 NOVEL CORONAVIRUS DISEASE (COVID-19): ICD-10-CM

## 2022-09-01 DIAGNOSIS — M27.40 MAXILLARY CYST: ICD-10-CM

## 2022-09-01 DIAGNOSIS — G47.33 OBSTRUCTIVE SLEEP APNEA SYNDROME: ICD-10-CM

## 2022-09-01 PROCEDURE — 99214 OFFICE O/P EST MOD 30 MIN: CPT | Performed by: INTERNAL MEDICINE

## 2022-09-01 RX ORDER — ALBUTEROL SULFATE 2.5 MG/3ML
2.5 SOLUTION RESPIRATORY (INHALATION) EVERY 4 HOURS PRN
Qty: 360 EACH | Refills: 3 | Status: SHIPPED | OUTPATIENT
Start: 2022-09-01 | End: 2022-11-30

## 2022-09-01 RX ORDER — BUDESONIDE 0.5 MG/2ML
0.5 INHALANT ORAL 2 TIMES DAILY
COMMUNITY
End: 2022-09-01

## 2022-09-01 RX ORDER — PREDNISONE 10 MG/1
TABLET ORAL
Qty: 21 TABLET | Refills: 0 | Status: SHIPPED | OUTPATIENT
Start: 2022-09-01 | End: 2023-02-13

## 2022-09-01 NOTE — PROGRESS NOTES
Background:  Patient w gold 2 copd   Chief Complaint  COPD    Subjective    History of Present Illness       Wayne Linares presents to [facility] PULMONARY & CRITICAL CARE MEDICINE.  Pt is worked in for an urgent visit.  He has had increasing cough despite use of inhalers.  He has done well usually on Symbicort and tudorza.  This is all since he had Covid earlier in August since I saw him at the last visit.  He has had a lot of congestion.  Notably he also takes propranolol.  He has a maxillary cyst and he wants to get that checked out more.  He has had family who saw the PA in the ENT department at [facility] and would like to see him.       Tobacco Use: Low Risk    • Smoking Tobacco Use: Never Smoker   • Smokeless Tobacco Use: Never Used      Current Outpatient Medications   Medication Instructions   • aclidinium bromide (TUDORZA PRESSAIR) 400 MCG/ACT aerosol powder  powder for inhalation 1 puff, Inhalation, 2 Times Daily - RT   • albuterol (PROVENTIL HFA;VENTOLIN HFA) 108 (90 BASE) MCG/ACT inhaler Inhalation   • albuterol (PROVENTIL) 2.5 mg, Nebulization, Every 4 Hours PRN   • aspirin 81 MG tablet Oral   • EPINEPHrine (EPIPEN) 0.3 MG/0.3ML solution auto-injector injection Intramuscular   • famotidine (PEPCID) 10 mg, Oral, 2 Times Daily   • Farxiga 5 MG tablet tablet No dose, route, or frequency recorded.   • ferrous sulfate 325 (65 FE) MG tablet Oral   • FLUoxetine (PROzac) 20 MG capsule TAKE 1 CAPSULE BY MOUTH DAILY IN THE MORNING   • hydrochlorothiazide (HYDRODIURIL) 12.5 MG tablet No dose, route, or frequency recorded.   • isosorbide mononitrate (IMDUR) 30 MG 24 hr tablet No dose, route, or frequency recorded.   • lansoprazole (PREVACID) 30 MG capsule No dose, route, or frequency recorded.   • levocetirizine (XYZAL) 5 MG tablet Oral   • Omega-3 Fatty Acids (fish oil) 1000 MG capsule capsule Oral, Daily With Breakfast   • pravastatin (PRAVACHOL) 10 mg, Oral, Daily   • predniSONE (DELTASONE) 10  "MG tablet Take 2 tabs daily for 7 days then 1 tab daily for 7 days   • propranolol LA (INDERAL LA) 120 MG 24 hr capsule No dose, route, or frequency recorded.   • Semaglutide (OZEMPIC, 1 MG/DOSE, SC) Subcutaneous, Weekly   • SYMBICORT 160-4.5 MCG/ACT inhaler 2 puffs, Inhalation, 2 Times Daily - RT   • vitamin D (ERGOCALCIFEROL) 63082 UNITS capsule capsule No dose, route, or frequency recorded.      Objective     Vital Signs:   /70   Pulse 68   Ht 188 cm (74\")   Wt 120 kg (265 lb)   SpO2 98% Comment: RA  BMI 34.02 kg/m²   Physical Exam   Result Review  Data Reviewed:{ Labs  Result Review  Imaging  Media :23}     XR Chest 2 View    Result Date: 8/30/2022  Impression: 1. Stable chronic change. 2. No acute infiltrate.   This report was finalized on 08/30/2022 16:51 by Dr. Isaac Aguirre MD.    My interpretation of radiograph: minimally increased interstitial markings, no acute infiltrates or effusions   PFT Values        Some values may be hidden. Unless noted otherwise, only the newest values recorded on each date are displayed.         Old Values PFT Results 8/9/21   No data to display.      Pre Drug PFT Results 8/9/21   FVC 93   FEV1 77   FEF 25-75% 42   FEV1/FVC 65.66      Post Drug PFT Results 8/9/21   No data to display.      Other Tests PFT Results 8/9/21   No data to display.                      Assessment and Plan  {CC Problem List  Visit Diagnosis  ROS  Review (Popup)  Health Maintenance  Quality  BestPractice  Medications  SmartSets  SnapShot Encounters  Media :23}   Diagnoses and all orders for this visit:    1. Stage 2 moderate COPD by GOLD classification ([facility]) (Primary)  -     predniSONE (DELTASONE) 10 MG tablet; Take 2 tabs daily for 7 days then 1 tab daily for 7 days  Dispense: 21 tablet; Refill: 0    2. History of 2019 novel coronavirus disease (COVID-19)    3. Obstructive sleep apnea syndrome    4. Maxillary cyst  -     Ambulatory Referral to ENT (Otolaryngology)    Other " orders  -     aclidinium bromide (TUDORZA PRESSAIR) 400 MCG/ACT aerosol powder  powder for inhalation; Inhale 1 puff 2 (Two) Times a Day.  Dispense: 3 each; Refill: 3  -     albuterol (PROVENTIL) (2.5 MG/3ML) 0.083% nebulizer solution; Take 2.5 mg by nebulization Every 4 (Four) Hours As Needed for Wheezing or Shortness of Air for up to 90 days.  Dispense: 360 each; Refill: 3    He may be in early exacerbation or sequelae of Covid; in any event, steroid tx indicated.  The propranolol may be making his pulmonary situation worse.  I recommend he discuss with the prescirber changing to a more cardioselective beta blocker if he does not get better with steroids.  Refill other inhalermeds.  Refer to ent at [facility] per pt request.  No need to follow up cxr following recent Covid  He is using cpap for tay, continue same as he is doing well with that.    Follow Up {Instructions Charge Capture  Follow-up Communications :23}   Return in about 4 months (around 1/1/2023) for Spirometry and DLCO.  Patient was given instructions and counseling regarding his condition or for health maintenance advice. Please see specific information pulled into the AVS if appropriate.    Electronically signed by Laron Sparrow MD, 9/1/2022, 13:29 CDT

## 2022-09-02 ENCOUNTER — TELEPHONE (OUTPATIENT)
Dept: PULMONOLOGY | Facility: CLINIC | Age: 72
End: 2022-09-02

## 2022-09-02 RX ORDER — TIOTROPIUM BROMIDE INHALATION SPRAY 3.12 UG/1
2 SPRAY, METERED RESPIRATORY (INHALATION) DAILY
Qty: 12 G | Refills: 3 | Status: SHIPPED | OUTPATIENT
Start: 2022-09-02 | End: 2023-02-13

## 2022-09-02 NOTE — TELEPHONE ENCOUNTER
Tudorza is not covered and the alternatives are spiriva handihaler or spiriva respimat.    Do you want to switch him to one of these?

## 2022-09-13 ENCOUNTER — OFFICE VISIT (OUTPATIENT)
Dept: NEUROLOGY | Age: 72
End: 2022-09-13
Payer: MEDICARE

## 2022-09-13 VITALS
DIASTOLIC BLOOD PRESSURE: 67 MMHG | HEART RATE: 71 BPM | SYSTOLIC BLOOD PRESSURE: 117 MMHG | BODY MASS INDEX: 33.37 KG/M2 | WEIGHT: 260 LBS | HEIGHT: 74 IN | RESPIRATION RATE: 20 BRPM

## 2022-09-13 DIAGNOSIS — G25.0 ESSENTIAL TREMOR: ICD-10-CM

## 2022-09-13 DIAGNOSIS — G47.33 SLEEP APNEA, OBSTRUCTIVE: Primary | ICD-10-CM

## 2022-09-13 PROCEDURE — 1123F ACP DISCUSS/DSCN MKR DOCD: CPT | Performed by: PSYCHIATRY & NEUROLOGY

## 2022-09-13 PROCEDURE — G8427 DOCREV CUR MEDS BY ELIG CLIN: HCPCS | Performed by: PSYCHIATRY & NEUROLOGY

## 2022-09-13 PROCEDURE — G8417 CALC BMI ABV UP PARAM F/U: HCPCS | Performed by: PSYCHIATRY & NEUROLOGY

## 2022-09-13 PROCEDURE — 3017F COLORECTAL CA SCREEN DOC REV: CPT | Performed by: PSYCHIATRY & NEUROLOGY

## 2022-09-13 PROCEDURE — 99213 OFFICE O/P EST LOW 20 MIN: CPT | Performed by: PSYCHIATRY & NEUROLOGY

## 2022-09-13 PROCEDURE — 1036F TOBACCO NON-USER: CPT | Performed by: PSYCHIATRY & NEUROLOGY

## 2022-09-13 NOTE — PROGRESS NOTES
[facility]  [address] [place] [address] Drive, [address] [address],[address]  [facility], [address]  Phone [phone]  Fax [phone]     [facility] Follow Up Encounter  22 10:57 AM CDT    Information:   Patient Name: Adiel Hyatt  :   1950  Age:   71 y.o. MRN:   [id number]  Account #:  [de-identified]  Today:  22    Provider: Heath Caldwell M.D. Chief Complaint:   Chief Complaint   Patient presents with    Follow-up    Sleep Apnea       Subjective:   Adiel Hyatt is a 71 y.o. man with a history of obstructive sleep apnea and essential tremor who is following up. His CPAP has been replaced. He uses his CPAP nightly. He rests well. His tremor is worse. He has tried primidone, gabapentin, and Valium. He cannot take Topamax due to kidney stones. He has not yet had the DBS. Objective:     Past Medical History:  Past Medical History:   Diagnosis Date    Allergic rhinitis     Clark esophagus     Chronic fatigue syndrome     COPD (chronic obstructive pulmonary disease) ([address]     Coronary atherosclerosis due to calcified coronary lesion of native artery     Gastritis     GERD (gastroesophageal reflux disease)     Glucose intolerance (impaired glucose tolerance)     History of knee sprain     Hyperlipidemia     Hypertension     Kidney stones     Non-insulin dependent type 2 diabetes mellitus ([address]     Obesity     ELROY on CPAP     Tremor        Past Surgical History:   Procedure Laterality Date    BARIATRIC SURGERY  2005    CARDIAC CATHETERIZATION  1998    no blockages    CATARACT EXTRACTION W/  INTRAOCULAR LENS IMPLANT Bilateral 2021    COLONOSCOPY  2006    ELBOW SURGERY Right 2013    TOTAL KNEE ARTHROPLASTY Right 2013    UPPER GASTROINTESTINAL ENDOSCOPY  2002    UPPER GASTROINTESTINAL ENDOSCOPY  2012    UPPER GASTROINTESTINAL ENDOSCOPY         Recent Hospitalizations  None    Significant Injuries  None    Habits  Adiel Hyatt reports that he has never smoked.  He has never used smokeless tobacco. He reports that he does not currently use alcohol. He reports that he does not use drugs. Family History   Problem Relation Age of Onset    Stroke Mother     Cancer Mother     Heart Attack Father     Heart Disease Father     Arthritis Sister     Cancer Brother         BLADDER    No Known Problems Maternal Grandmother     No Known Problems Maternal Grandfather     Heart Disease Paternal Grandfather     No Known Problems Sister        Social History  Lang Fuentes is , lives in [place], [place], and is retired. Medications:  Current Outpatient Medications   Medication Sig Dispense Refill    propranolol (INDERAL LA) 120 MG extended release capsule TAKE 1 CAPSULE DAILY 90 capsule 3    loratadine (CLARITIN) 10 MG capsule Take 10 mg by mouth daily      ketoconazole (NIZORAL) 2 % cream Apply topically daily Apply topically daily. nitroGLYCERIN (NITROSTAT) 0.4 MG SL tablet up to max of 3 total doses.  If no relief after 1 dose, call 911. 25 tablet 3    dapagliflozin (FARXIGA) 5 MG tablet Take 10 mg by mouth daily       Semaglutide, 1 MG/DOSE, 2 MG/1.5ML SOPN Inject into the skin once a week      pravastatin (PRAVACHOL) 20 MG tablet TAKE 1 TABLET BY MOUTH DAILY 90 tablet 3    isosorbide mononitrate (IMDUR) 30 MG extended release tablet Take 1 tablet by mouth daily 30 tablet 5    budesonide-formoterol (SYMBICORT) 160-4.5 MCG/ACT AERO Inhale 2 puffs into the lungs 2 times daily      aspirin 81 MG tablet Take 81 mg by mouth daily      albuterol sulfate HFA (PROVENTIL;VENTOLIN;PROAIR) 108 (90 Base) MCG/ACT inhaler Inhale 2 puffs into the lungs every 6 hours as needed for Wheezing      fluticasone (FLONASE) 50 MCG/ACT nasal spray 1 spray by Nasal route daily      lansoprazole (PREVACID) 30 MG delayed release capsule Take 30 mg by mouth daily      vitamin D (ERGOCALCIFEROL) 57039 UNITS CAPS capsule Take 50,000 Units by mouth once a week       ferrous sulfate 325 (65 FE) MG tablet Take 325 mg by mouth daily (with breakfast)       FLUoxetine (PROZAC) 20 MG capsule Take 20 mg by mouth daily       hydrochlorothiazide (HYDRODIURIL) 12.5 MG tablet Take 12.5 mg by mouth daily       levocetirizine (XYZAL) 5 MG tablet Take 5 mg by mouth nightly      timolol (TIMOPTIC-XE) 0.5 % ophthalmic gel-forming Place 1 drop into both eyes nightly       No current facility-administered medications for this visit. Allergies:   Allergies as of 09/13/2022 - Fully Reviewed 09/13/2022   Allergen Reaction Noted    Iodine Anaphylaxis 12/07/2016    Shellfish allergy Swelling 01/05/2021    Pcn [penicillins]  12/07/2016    Sulfa antibiotics  12/07/2016       Review of Systems:  Constitutional: negative for - chills and fever  Eyes:  negative for - visual disturbance and photophobia  HENMT: negative for - headaches and sinus pain  Respiratory: negative for - cough, hemoptysis, and shortness of breath  Cardiovascular: negative for - chest pain and palpitations  Gastrointestinal: negative for - blood in stools, constipation, diarrhea, nausea, and vomiting  Genito-Urinary: negative for - hematuria, urinary frequency, urinary urgency, and urinary retention  Musculoskeletal: negative for - joint pain, joint stiffness, and joint swelling  Hematological and Lymphatic: negative for - bleeding problems, abnormal bruising, and swollen lymph nodes  Endocrine:  negative for - polydipsia and polyphagia  Allergy/Immunology:  negative for - rhinorrhea, sinus congestion, hives  Integument:  negative for - negative for - rash, change in moles, new or changing lesions  Psychological: negative for - anxiety and depression  Neurological: negative for - memory loss, numbness/tingling, and weakness     PHYSICAL EXAMINATION:  Vitals:  /67   Pulse 71   Resp 20   Ht 6' 2\" (1.88 m)   Wt 260 lb (117.9 kg)   BMI 33.38 kg/m²   General appearance:  Alert, well developed, well nourished, in no distress  HEENT:  normocephalic, atraumatic, sclera appear normal, no nasal abnormalities, no rhinorrhea, Ears appear normal, oral mucous membranes are moist without erythema, trachea midline, thyroid is normal, no lymphadenopathy or neck mass. Cardiovascular:  Regular rate and rhythm without murmer. No peripheral edema, No cyanosis or clubbing. No carotid bruits. Pulmonary:  Lungs are clear to auscultation. Breathing appears normal, good expansion, normal effort without use of accessory muscles  Musculoskeletal:  Joints are osteoarthritic  Integument:  No rash, erythema, or pallor  Psychiatric:  Mood, affect, and behavior appear normal      NEUROLOGIC EXAMINATION:  Mental Status:  alert, oriented to person, place, and time. Speech:  Clear without dysarthria or dysphonia  Language:  Fluent without aphasia  Cranial Nerves:   II Visual fields are full to confrontation   III,IV, VI Extraocular movements are full   VII Facial movements are symmetrical without weakness   VIII Hearing is intact   IX,X Shoulder shrug and head rotation strength are intact   XII No tongue atrophy or fasciculations. Normal tongue protrusion. No tongue weakness  Motor:  Normal strength in both upper and lower extremities. Normal muscle tone and bulk. Deep tendon reflexes are intact and symmetrical in both upper and lower extremities. Causey's signs are absent bilaterally. There is no ankle clonus on either side. He has a prominent postural hand tremor. Sensation:  Sensation is intact to light touch, temperature, and vibration in all extremities. Coordination:  Rapid alternating movements are normal in both upper and lower extremities. Finger to nose testing is unimpaired bilaterally. Gait:  Normal station and gait. Pertinent Diagnostic Studies:  CPAP download shows that he is 100% compliant with CPAP at 11cm with residual AHI of 2.3. He has a Respironics machine that was replaced. Assessment:       ICD-10-CM    1. Sleep apnea, obstructive  G47.33       2. Essential tremor  G25.0       He is objectively compliant with and clinically benefiting from CPAP use. Plan:   1. Continue CPAP use during sleep  2.          DBS for tremor at [place] at some point  3.         FU in a year    Electronically signed by Sherry Valdez MD on 9/13/22

## 2022-09-14 ENCOUNTER — OFFICE VISIT (OUTPATIENT)
Dept: CARDIOLOGY CLINIC | Age: 72
End: 2022-09-14
Payer: MEDICARE

## 2022-09-14 VITALS
HEART RATE: 60 BPM | BODY MASS INDEX: 34.01 KG/M2 | HEIGHT: 74 IN | DIASTOLIC BLOOD PRESSURE: 68 MMHG | WEIGHT: 265 LBS | SYSTOLIC BLOOD PRESSURE: 100 MMHG

## 2022-09-14 DIAGNOSIS — I25.10 CORONARY ARTERY DISEASE INVOLVING NATIVE CORONARY ARTERY OF NATIVE HEART WITHOUT ANGINA PECTORIS: Primary | ICD-10-CM

## 2022-09-14 DIAGNOSIS — I10 PRIMARY HYPERTENSION: ICD-10-CM

## 2022-09-14 DIAGNOSIS — G47.33 OSA ON CPAP: ICD-10-CM

## 2022-09-14 DIAGNOSIS — Z99.89 OSA ON CPAP: ICD-10-CM

## 2022-09-14 PROCEDURE — G8417 CALC BMI ABV UP PARAM F/U: HCPCS | Performed by: CLINICAL NURSE SPECIALIST

## 2022-09-14 PROCEDURE — 99214 OFFICE O/P EST MOD 30 MIN: CPT | Performed by: CLINICAL NURSE SPECIALIST

## 2022-09-14 PROCEDURE — 1036F TOBACCO NON-USER: CPT | Performed by: CLINICAL NURSE SPECIALIST

## 2022-09-14 PROCEDURE — G8427 DOCREV CUR MEDS BY ELIG CLIN: HCPCS | Performed by: CLINICAL NURSE SPECIALIST

## 2022-09-14 PROCEDURE — 93000 ELECTROCARDIOGRAM COMPLETE: CPT | Performed by: CLINICAL NURSE SPECIALIST

## 2022-09-14 PROCEDURE — 1123F ACP DISCUSS/DSCN MKR DOCD: CPT | Performed by: CLINICAL NURSE SPECIALIST

## 2022-09-14 PROCEDURE — 3017F COLORECTAL CA SCREEN DOC REV: CPT | Performed by: CLINICAL NURSE SPECIALIST

## 2022-09-14 NOTE — PROGRESS NOTES
[facility]  [facility], [address]  [address]  Phone: [phone]  Fax: [phone]    OFFICE VISIT:  2022    Shelia Figueroa - : 1950    Reason For Visit:  Aristeo Randall is a 71 y.o. male who is here for Follow-up (No cardiac symptoms), Hypertension, and Coronary Artery Disease  1. Coronary artery disease, abnormal dobutamine stress echo with NSVT, cardiac catheterization 2020 with mid LAD 55% stenosis, IFR 0.92, medically managed, negative Lexiscan study 3/6/2021, normal LV ejection fraction. 2.  Non-insulin-dependent diabetes mellitus. 3.  Hypertension. 4.  Severe obesity with ELROY. -Wearing CPAP    He returns today for routine follow-up. He denies any cardiac complaints. Taking his medications as prescribed  He had Covid in Aug and took steroid. Jeannette Landis denies exertional chest pain, shortness of breath, orthopnea, paroxysmal nocturnal dyspnea, syncope, presyncope, arrhythmia, edema and fatigue. The patient denies numbness or weakness to suggest cerebrovascular accident or transient ischemic attack. Hermelinda Machado MD is PCP and follows labs including lipids.   Follows with Dr. Kushal Ramos, pulmonologist for COPD  Follows with Dr. Crystal West, neurologist for ELROY  Shelia Figueroa has the following history as recorded in [facility]:    Patient Active Problem List    Diagnosis Date Noted    Syncope     Chest pain 2021    Ventricular tachyarrhythmia ([address] 2020    V-tach ([address] 2020    Right ventricular enlargement 2019    Biatrial enlargement 2019    Type 2 diabetes mellitus with complication, without long-term current use of insulin ([address] 2019    Sleep apnea, obstructive 2017    Coronary atherosclerosis due to calcified coronary lesion of native artery     Hypertension     Class 2 obesity due to excess calories without serious comorbidity with body mass index (BMI) of 38.0 to 38.9 in adult     ELROY on CPAP      Past Medical History:   Diagnosis Date    Allergic rhinitis     Clark esophagus     Chronic fatigue syndrome     COPD (chronic obstructive pulmonary disease) (HCC)     Coronary artery disease involving native coronary artery of native heart without angina pectoris     Coronary atherosclerosis due to calcified coronary lesion of native artery     Gastritis     GERD (gastroesophageal reflux disease)     Glucose intolerance (impaired glucose tolerance)     History of knee sprain     Hyperlipidemia     Hypertension     Kidney stones     Non-insulin dependent type 2 diabetes mellitus (HCC)     Obesity     ELROY on CPAP     Tremor      Past Surgical History:   Procedure Laterality Date    BARIATRIC SURGERY  02/09/2005    CARDIAC CATHETERIZATION  1998    no blockages    CARDIAC CATHETERIZATION  01/06/2020    LAD 60%, EF 60%    CATARACT REMOVAL WITH IMPLANT Bilateral 12/2021    COLONOSCOPY  05/22/2006    ELBOW SURGERY Right 08/21/2013    TOTAL KNEE ARTHROPLASTY Right 03/18/2013    UPPER GASTROINTESTINAL ENDOSCOPY  2002    UPPER GASTROINTESTINAL ENDOSCOPY  04/19/2012    UPPER GASTROINTESTINAL ENDOSCOPY       Family History   Problem Relation Age of Onset    Stroke Mother     Cancer Mother     Heart Attack Father     Heart Disease Father     Arthritis Sister     Cancer Brother         BLADDER    No Known Problems Maternal Grandmother     No Known Problems Maternal Grandfather     Heart Disease Paternal Grandfather     No Known Problems Sister      Social History     Tobacco Use    Smoking status: Never    Smokeless tobacco: Never   Substance Use Topics    Alcohol use: Not Currently      Current Outpatient Medications   Medication Sig Dispense Refill    propranolol (INDERAL LA) 120 MG extended release capsule TAKE 1 CAPSULE DAILY 90 capsule 3    loratadine (CLARITIN) 10 MG capsule Take 10 mg by mouth daily      ketoconazole (NIZORAL) 2 % cream Apply topically daily Apply topically daily.       nitroGLYCERIN (NITROSTAT) 0.4 MG SL tablet up to max of 3 total doses. If no relief after 1 dose, call 911. 25 tablet 3    dapagliflozin (FARXIGA) 5 MG tablet Take 10 mg by mouth daily       Semaglutide, 1 MG/DOSE, 2 MG/1.5ML SOPN Inject into the skin once a week      pravastatin (PRAVACHOL) 20 MG tablet TAKE 1 TABLET BY MOUTH DAILY 90 tablet 3    isosorbide mononitrate (IMDUR) 30 MG extended release tablet Take 1 tablet by mouth daily 30 tablet 5    budesonide-formoterol (SYMBICORT) 160-4.5 MCG/ACT AERO Inhale 2 puffs into the lungs 2 times daily      aspirin 81 MG tablet Take 81 mg by mouth daily      fluticasone (FLONASE) 50 MCG/ACT nasal spray 1 spray by Nasal route daily      lansoprazole (PREVACID) 30 MG delayed release capsule Take 30 mg by mouth daily      vitamin D (ERGOCALCIFEROL) 77379 UNITS CAPS capsule Take 50,000 Units by mouth once a week       ferrous sulfate 325 (65 FE) MG tablet Take 325 mg by mouth daily (with breakfast)       FLUoxetine (PROZAC) 20 MG capsule Take 20 mg by mouth daily       hydrochlorothiazide (HYDRODIURIL) 12.5 MG tablet Take 12.5 mg by mouth daily       albuterol sulfate HFA (PROVENTIL;VENTOLIN;PROAIR) 108 (90 Base) MCG/ACT inhaler Inhale 2 puffs into the lungs every 6 hours as needed for Wheezing (Patient not taking: Reported on 9/14/2022)      timolol (TIMOPTIC-XE) 0.5 % ophthalmic gel-forming Place 1 drop into both eyes nightly (Patient not taking: Reported on 9/14/2022)       No current facility-administered medications for this visit. Allergies: Iodine, Shellfish allergy, Pcn [penicillins], and Sulfa antibiotics    Review of Systems  Constitutional - no significant activity change, appetite change, or unexpected weight change. No fever, chills or diaphoresis. No fatigue. HEENT - no significant rhinorrhea or epistaxis. No tinnitus or significant hearing loss. Eyes - no sudden vision change or amaurosis. Respiratory - no significant wheezing, stridor, apnea or cough.   No dyspnea on exertion or shortness of breath. Cardiovascular - no exertional chest pain, orthopnea or PND. No sensation of arrhythmia or slow heart rate. No claudication or leg edema. Gastrointestinal - no abdominal swelling or pain. No blood in stool. No severe constipation, diarrhea, nausea, or vomiting. Genitourinary - no difficulty urinating, dysuria, frequency, or urgency. No flank pain or hematuria. Musculoskeletal - no back pain, gait disturbance, or myalgia. Skin - no color change or rash. No pallor. No new surgical incision. Neurologic - no speech difficulty, facial asymmetry or lateralizing weakness. No seizures, presyncope, syncope, or significant dizziness. Hematologic - no easy bruising or excessive bleeding. Psychiatric - no severe anxiety or insomnia. No confusion. All other review of systems are negative. Objective  Vital Signs - /68   Pulse 60   Ht 6' 2\" (1.88 m)   Wt 265 lb (120.2 kg)   BMI 34.02 kg/m²   General - [facility] Lat is alert, cooperative, and pleasant. Well groomed. No acute distress. Body habitus is obese. HEENT - The head is normocephalic. No circumoral cyanosis. Dentition is normal.   EYES -  No Xanthelasma, no arcus senilis, no conjunctival hemorrhages or discharge. Neck - Supple, without increased jugular venous pressures. No carotid bruits. No mass. Respiratory - Lungs are clear bilaterally. No wheezes or rales. Normal effort without use of accessory muscles. Cardiovascular - Heart has regular rhythm and rate. No murmurs, rubs or gallops. + pedal pulses and no varicosities. Abdominal -  Soft, nontender, nondistended. Bowel sounds are intact. Extremities - No clubbing, cyanosis, or  edema. Musculoskeletal -  No clubbing . No Osler's nodes. Gait normal .  No kyphosis or scoliosis. Skin -  no statis ulcers or dermatitis. Neurological - No focal signs are identified. Oriented to person, place and time.     Psychiatric -  Appropriate affect and mood.       Assessment:     Diagnosis Orders   1. Coronary artery disease involving native coronary artery of native heart without angina pectoris        2. Primary hypertension  EKG 12 lead      3. ELROY on CPAP          Data:  BP Readings from Last 3 Encounters:   09/14/22 100/68   09/13/22 117/67   01/12/22 112/80    Pulse Readings from Last 3 Encounters:   09/14/22 60   09/13/22 71   01/12/22 68        Wt Readings from Last 3 Encounters:   09/14/22 265 lb (120.2 kg)   09/13/22 260 lb (117.9 kg)   01/12/22 268 lb (121.6 kg)   EKG today shows normal sinus rhythm with a rate of 60. Right BBB and left axis. Stable EKG    Blood pressure and heart rate are well controlled. Medical manage includes beta-blocker, diuretic long-acting nitrate along with his statin and low-dose aspirin   Reviewed recent notes from pulmonology and neurology  PCP manages labs-Dr. Enrique Terry     Cardiac Arteries and Lesion Findings 1/6/20   LMCA: Left Main widely patent. LAD: LAD mid 60% stenosis. Nonobstructive my IFR testing (IFR equals 0.92). Lesion on Mid LAD: 60% stenosis . LCx: Circumflex with mild luminal irregularities. RCA: RCA with moderate luminal irregularities. VA   LV function assessed as:Normal.  Ejection Fraction    - Method: LV gram. EF%: 60.   Procedure Summary  Anesthesia: Lidocaine LA  Sedation: Versed 1 mg: Fentanyl 50mg  Start time: 7:09 PM  Stop time: 7:43 PM  ASA Class: 3  An independent trained observer pushed medications at my direction. The patient was monitored continuously with the ECG, pulse oximetry, blood  pressure monitoring, and direct observation for level of consciousness. Estimated blood loss: less than 10 mL      Procedure Data     Procedure Date  Date: 01/06/2020Start: 18:38    2D echo December 2016   1. Limited visualization due to body habitus   2. Bi-atrial enlargement   3. Moderately enlarged left ventricular cavity dimensions   4. Mild concentric LVH   5.  Normal LV systolic function (estimated EF 55 -60 %)   6. Enlarged RV with reduced function   ----------------------------------------------   Electronically signed by Natalie Brian MD(Interpreting   physician) on 12/09/2016 03:57 PM     States taking medications as prescribed  Stable cardiovascular status. No evidence of overt heart failure, angina or dysrhythmia. 30 minutes were spent preparing, reviewing and seeing patient. All questions answered    Plan    Follow up in 6-9 mos  With Dr. Jairo Contreras   Call with any questions or concerns  Follow up with Kelby Carter MD for non cardiac problems and labs   Report any new problems  Cardiovascular Fitness-Exercise as tolerated. Strive for 30 minutes of exercise most days of the week. Cardiac / Healthy Diet  Continue current medications as directed  Continue plan of treatment  It is always recommended that you bring your medications bottles with you to each visit - this is for your safety! CHRISTOPHER Reynoso dragon/transcription disclaimer: Much of this encounter note is electronic transcription/translation of spoken language to printed tach. Electronic translation of spoken language may be erroneous, or at times, nonsensical words or phrases may be inadvertently transcribed.  Although, I have reviewed the note for such errors, some may still exist.

## 2022-09-14 NOTE — PATIENT INSTRUCTIONS
Follow up in 6-9 mos  With Dr. Janette Cervantes   Call with any questions or concerns  Follow up with Rosey Dickey MD for non cardiac problems and labs   Report any new problems  Cardiovascular Fitness-Exercise as tolerated. Strive for 30 minutes of exercise most days of the week. Cardiac / Healthy Diet  Continue current medications as directed  Continue plan of treatment  It is always recommended that you bring your medications bottles with you to each visit - this is for your safety!

## 2022-09-27 ENCOUNTER — OFFICE VISIT (OUTPATIENT)
Dept: ENT CLINIC | Age: 72
End: 2022-09-27
Payer: MEDICARE

## 2022-09-27 VITALS
SYSTOLIC BLOOD PRESSURE: 128 MMHG | WEIGHT: 265 LBS | BODY MASS INDEX: 34.01 KG/M2 | HEIGHT: 74 IN | DIASTOLIC BLOOD PRESSURE: 80 MMHG

## 2022-09-27 DIAGNOSIS — J34.89 NASAL OBSTRUCTION: Primary | ICD-10-CM

## 2022-09-27 DIAGNOSIS — J34.1 MAXILLARY SINUS CYST: ICD-10-CM

## 2022-09-27 PROCEDURE — 1036F TOBACCO NON-USER: CPT | Performed by: PHYSICIAN ASSISTANT

## 2022-09-27 PROCEDURE — 3017F COLORECTAL CA SCREEN DOC REV: CPT | Performed by: PHYSICIAN ASSISTANT

## 2022-09-27 PROCEDURE — 1123F ACP DISCUSS/DSCN MKR DOCD: CPT | Performed by: PHYSICIAN ASSISTANT

## 2022-09-27 PROCEDURE — 99203 OFFICE O/P NEW LOW 30 MIN: CPT | Performed by: PHYSICIAN ASSISTANT

## 2022-09-27 PROCEDURE — G8417 CALC BMI ABV UP PARAM F/U: HCPCS | Performed by: PHYSICIAN ASSISTANT

## 2022-09-27 PROCEDURE — G8427 DOCREV CUR MEDS BY ELIG CLIN: HCPCS | Performed by: PHYSICIAN ASSISTANT

## 2022-09-27 ASSESSMENT — ENCOUNTER SYMPTOMS
EYE PAIN: 0
FACIAL SWELLING: 0
PHOTOPHOBIA: 0
SINUS PRESSURE: 0
SORE THROAT: 0
RHINORRHEA: 0
TROUBLE SWALLOWING: 0
SINUS PAIN: 0
VOICE CHANGE: 0

## 2022-09-27 NOTE — ASSESSMENT & PLAN NOTE
Worsening nasal obstruction- questionably secondary to turbinate hypertrophy versus possible sinus etiology  Plan: I recommended a CT of the facial bones to evaluate the nasal passageway as well as the sinuses. I will call the patient once I reviewed the films for further recommendations.

## 2022-09-27 NOTE — PROGRESS NOTES
[facility] OTOLARYNGOLOGY/ENT  Mr. Eyad Phillip is a pleasant 67-year-old  male that was referred by Jessica Chisholm due to problems with nasal obstruction. Patient reports that he has a history of having a maxillary sinus cyst that was diagnosed years ago by Dr. Shara Gallagher. At that time no surgical intervention was needed and he was asymptomatic. Now he reports that he has noticed nasal obstruction that is worse when he is try to sleep at night. He admits to wearing a CPAP machine and feels like the obstruction is more from the nasal passageway. He denies any history of nasal trauma. At this time he has had no radiological studies. Allergies: Iodine, Shellfish allergy, Pcn [penicillins], and Sulfa antibiotics      Current Outpatient Medications   Medication Sig Dispense Refill    propranolol (INDERAL LA) 120 MG extended release capsule TAKE 1 CAPSULE DAILY 90 capsule 3    loratadine (CLARITIN) 10 MG capsule Take 10 mg by mouth daily      ketoconazole (NIZORAL) 2 % cream Apply topically daily Apply topically daily. nitroGLYCERIN (NITROSTAT) 0.4 MG SL tablet up to max of 3 total doses.  If no relief after 1 dose, call 911. 25 tablet 3    dapagliflozin (FARXIGA) 5 MG tablet Take 10 mg by mouth daily       Semaglutide, 1 MG/DOSE, 2 MG/1.5ML SOPN Inject into the skin once a week      pravastatin (PRAVACHOL) 20 MG tablet TAKE 1 TABLET BY MOUTH DAILY 90 tablet 3    isosorbide mononitrate (IMDUR) 30 MG extended release tablet Take 1 tablet by mouth daily 30 tablet 5    budesonide-formoterol (SYMBICORT) 160-4.5 MCG/ACT AERO Inhale 2 puffs into the lungs 2 times daily      aspirin 81 MG tablet Take 81 mg by mouth daily      fluticasone (FLONASE) 50 MCG/ACT nasal spray 1 spray by Nasal route daily      lansoprazole (PREVACID) 30 MG delayed release capsule Take 30 mg by mouth daily      vitamin D (ERGOCALCIFEROL) 78103 UNITS CAPS capsule Take 50,000 Units by mouth once a week       ferrous sulfate 325 (65 FE) MG tablet Take 325 mg by mouth daily (with breakfast)       FLUoxetine (PROZAC) 20 MG capsule Take 20 mg by mouth daily       hydrochlorothiazide (HYDRODIURIL) 12.5 MG tablet Take 12.5 mg by mouth daily       timolol (TIMOPTIC-XE) 0.5 % ophthalmic gel-forming Place 1 drop into both eyes nightly      albuterol sulfate HFA (PROVENTIL;VENTOLIN;PROAIR) 108 (90 Base) MCG/ACT inhaler Inhale 2 puffs into the lungs every 6 hours as needed for Wheezing (Patient not taking: No sig reported)       No current facility-administered medications for this visit.        Past Surgical History:   Procedure Laterality Date    BARIATRIC SURGERY  02/09/2005    CARDIAC CATHETERIZATION  1998    no blockages    CARDIAC CATHETERIZATION  01/06/2020    LAD 60%, EF 60%    CATARACT REMOVAL WITH IMPLANT Bilateral 12/2021    COLONOSCOPY  05/22/2006    ELBOW SURGERY Right 08/21/2013    TOTAL KNEE ARTHROPLASTY Right 03/18/2013    UPPER GASTROINTESTINAL ENDOSCOPY  2002    UPPER GASTROINTESTINAL ENDOSCOPY  04/19/2012    UPPER GASTROINTESTINAL ENDOSCOPY         Past Medical History:   Diagnosis Date    Allergic rhinitis     Clark esophagus     Chronic fatigue syndrome     COPD (chronic obstructive pulmonary disease) (HCC)     Coronary artery disease involving native coronary artery of native heart without angina pectoris     Coronary atherosclerosis due to calcified coronary lesion of native artery     Gastritis     GERD (gastroesophageal reflux disease)     Glucose intolerance (impaired glucose tolerance)     History of knee sprain     Hyperlipidemia     Hypertension     Kidney stones     Non-insulin dependent type 2 diabetes mellitus (HCC)     Obesity     ELROY on CPAP     Tremor        Family History   Problem Relation Age of Onset    Stroke Mother     Cancer Mother     Heart Attack Father     Heart Disease Father     Arthritis Sister     Cancer Brother         BLADDER    No Known Problems Maternal Grandmother     No Known Problems Maternal Grandfather     Heart Disease Paternal Grandfather     No Known Problems Sister        Social History     Tobacco Use    Smoking status: Never    Smokeless tobacco: Never   Substance Use Topics    Alcohol use: Not Currently           REVIEW OF SYSTEMS:  all other systems reviewed and are negative  Review of Systems   Constitutional:  Negative for chills and fever. HENT:  Negative for congestion, dental problem, ear discharge, ear pain, facial swelling, hearing loss, postnasal drip, rhinorrhea, sinus pressure, sinus pain, sore throat, tinnitus, trouble swallowing and voice change. Eyes:  Negative for photophobia and pain. Neurological:  Negative for dizziness and headaches. Comments:     PHYSICAL EXAM:    /80   Ht 6' 2\" (1.88 m)   Wt 265 lb (120.2 kg)   BMI 34.02 kg/m²   Body mass index is 34.02 kg/m². General Appearance: well developed  and well nourished  Head/ Face: normocephalic and atraumatic  Vocal Quality: good/ normal  Ears: Right Ear: External: external ears normal Otoscopy Ear Canal: canal clear Otoscopy TM: TM's normal and TM's mobile Left Ear: External: external ears normal Otoscopy Ear Canal: canal clear Otoscopy TM: TM's normal and TM's mobile  Hearing: grossly intact  Nose: septum midline and turbinates: engorged / congested  Neck: supple and adenopathy none palpable  Thyroid: normal and nodules No    Assessment & Plan:    Problem List Items Addressed This Visit       Maxillary sinus cyst    Relevant Orders    CT FACIAL BONES WO CONTRAST    Nasal obstruction - Primary     Worsening nasal obstruction- questionably secondary to turbinate hypertrophy versus possible sinus etiology  Plan: I recommended a CT of the facial bones to evaluate the nasal passageway as well as the sinuses. I will call the patient once I reviewed the films for further recommendations.          Relevant Orders    CT FACIAL BONES WO CONTRAST       Orders Placed This Encounter   Procedures    CT FACIAL BONES WO CONTRAST     Standing Status:   Future     Standing Expiration Date:   9/27/2023     Scheduling Instructions:      LMP     Order Specific Question:   Reason for exam:     Answer: Worsening nasal obstruction with a history of a maxillary sinus cyst       No orders of the defined types were placed in this encounter. Electronically signed by Karen Moreau PA-C on 9/27/22 at 3:02 PM CDT        Please note that this chart was generated using dragon dictation software. Although every effort was made to ensure the accuracy of this automated transcription, some errors in transcription may have occurred.

## 2022-10-11 ENCOUNTER — HOSPITAL ENCOUNTER (OUTPATIENT)
Dept: CT IMAGING | Age: 72
Discharge: HOME | End: 2022-10-11
Payer: MEDICARE

## 2022-10-11 DIAGNOSIS — J34.1 MAXILLARY SINUS CYST: ICD-10-CM

## 2022-10-11 DIAGNOSIS — J34.89 NASAL OBSTRUCTION: ICD-10-CM

## 2022-10-11 PROCEDURE — 70486 CT MAXILLOFACIAL W/O DYE: CPT | Performed by: RADIOLOGY

## 2022-10-11 PROCEDURE — 70486 CT MAXILLOFACIAL W/O DYE: CPT

## 2022-10-12 ENCOUNTER — TELEPHONE (OUTPATIENT)
Dept: ENT CLINIC | Age: 72
End: 2022-10-12

## 2022-10-12 RX ORDER — PREDNISONE 20 MG/1
TABLET ORAL
Qty: 34 TABLET | Refills: 0 | Status: SHIPPED | OUTPATIENT
Start: 2022-10-12

## 2022-10-12 RX ORDER — CEFUROXIME AXETIL 500 MG/1
500 TABLET ORAL 2 TIMES DAILY
Qty: 42 TABLET | Refills: 0 | Status: SHIPPED | OUTPATIENT
Start: 2022-10-12 | End: 2022-11-02

## 2022-10-12 NOTE — TELEPHONE ENCOUNTER
I called the patient the results of the CT of the sinuses. Patient was noted with opacification of the right maxillary sinus with the left side being negative. The maxillary cyst could not be detected due to the amount of opacification. Patient was noted with no nasal obstruction or any significant hypertrophy of the turbinates. Overall I believe that his breathing issues is likely from the chronic sinusitis. Will place the patient on 3 weeks of Ceftin and prednisone therapy x2 weeks. Patient is follow-up in 1 month for reevaluation.       Electronically signed by Momo Moss PA-C on 10/12/22 at 6:56 PM JASON

## 2022-10-14 ENCOUNTER — TELEPHONE (OUTPATIENT)
Dept: ENT CLINIC | Age: 72
End: 2022-10-14

## 2022-10-14 NOTE — TELEPHONE ENCOUNTER
Pt took steroids yesterday and did not sleep last night. He is not going to take anymore until he hears from Kaylee Kilgore. Advised pt Kaylee Kilgore will call him back.

## 2022-11-11 ENCOUNTER — OFFICE VISIT (OUTPATIENT)
Dept: ENT CLINIC | Age: 72
End: 2022-11-11
Payer: MEDICARE

## 2022-11-11 VITALS
HEIGHT: 74 IN | DIASTOLIC BLOOD PRESSURE: 74 MMHG | SYSTOLIC BLOOD PRESSURE: 130 MMHG | BODY MASS INDEX: 33.62 KG/M2 | WEIGHT: 262 LBS

## 2022-11-11 DIAGNOSIS — J32.0 MAXILLARY SINUSITIS, CHRONIC: Primary | ICD-10-CM

## 2022-11-11 PROCEDURE — 3017F COLORECTAL CA SCREEN DOC REV: CPT | Performed by: PHYSICIAN ASSISTANT

## 2022-11-11 PROCEDURE — G8417 CALC BMI ABV UP PARAM F/U: HCPCS | Performed by: PHYSICIAN ASSISTANT

## 2022-11-11 PROCEDURE — 1036F TOBACCO NON-USER: CPT | Performed by: PHYSICIAN ASSISTANT

## 2022-11-11 PROCEDURE — 99213 OFFICE O/P EST LOW 20 MIN: CPT | Performed by: PHYSICIAN ASSISTANT

## 2022-11-11 PROCEDURE — 3078F DIAST BP <80 MM HG: CPT | Performed by: PHYSICIAN ASSISTANT

## 2022-11-11 PROCEDURE — G8484 FLU IMMUNIZE NO ADMIN: HCPCS | Performed by: PHYSICIAN ASSISTANT

## 2022-11-11 PROCEDURE — 3074F SYST BP LT 130 MM HG: CPT | Performed by: PHYSICIAN ASSISTANT

## 2022-11-11 PROCEDURE — 1123F ACP DISCUSS/DSCN MKR DOCD: CPT | Performed by: PHYSICIAN ASSISTANT

## 2022-11-11 PROCEDURE — G8427 DOCREV CUR MEDS BY ELIG CLIN: HCPCS | Performed by: PHYSICIAN ASSISTANT

## 2022-11-11 RX ORDER — FLUCONAZOLE 100 MG/1
TABLET ORAL
COMMUNITY
Start: 2022-11-09

## 2022-11-11 NOTE — PROGRESS NOTES
Mr. Juju Hood is a pleasant 60-year-old  male that presents for a 1 month follow-up after treatment for opacification of the right maxillary sinus. Patient reports that after completing his antibiotic therapy he still feels swollen with the same symptoms with no relief. He was attempted to be treated with prednisone but was unable to tolerate this with the antibiotic therapy. He also reports that he is experiencing no nasal drainage since the antibiotic therapy. Physical examination revealed the patient to have no acute tenderness to percussion of the maxillary sinuses. Patient still is very nasal sounding due to the obstruction. Oral exam demonstrated no abnormalities to the posterior pharynx. No lymphadenopathy was appreciated to cervical exam.      Impression: Persistent right maxillary sinusitis with failed medical management    Plan: I recommended the patient to see Dr. David Patel for consideration of sinus surgery. Patient reports that he is around lots of fungal potential environments and believes that he may have a fungal infection. Patient was reminded to call if he has any questions or problems.       Electronically signed by Scar Payne PA-C on 11/11/22 at 1:12 PM CST

## 2022-12-13 ENCOUNTER — OFFICE VISIT (OUTPATIENT)
Dept: ENT CLINIC | Age: 72
End: 2022-12-13

## 2022-12-13 VITALS
SYSTOLIC BLOOD PRESSURE: 120 MMHG | BODY MASS INDEX: 34.14 KG/M2 | HEIGHT: 74 IN | DIASTOLIC BLOOD PRESSURE: 78 MMHG | WEIGHT: 266 LBS

## 2022-12-13 DIAGNOSIS — Z91.09 ENVIRONMENTAL ALLERGIES: Primary | ICD-10-CM

## 2022-12-13 DIAGNOSIS — J01.01 ACUTE RECURRENT MAXILLARY SINUSITIS: ICD-10-CM

## 2022-12-13 RX ORDER — FEXOFENADINE HCL 180 MG/1
180 TABLET ORAL DAILY
Qty: 30 TABLET | Refills: 3 | Status: SHIPPED | OUTPATIENT
Start: 2022-12-13 | End: 2023-01-12

## 2022-12-13 RX ORDER — AZELASTINE 1 MG/ML
2 SPRAY, METERED NASAL 2 TIMES DAILY
Qty: 60 ML | Refills: 3 | Status: SHIPPED | OUTPATIENT
Start: 2022-12-13

## 2022-12-13 RX ORDER — MONTELUKAST SODIUM 10 MG/1
10 TABLET ORAL NIGHTLY
Qty: 30 TABLET | Refills: 3 | Status: SHIPPED | OUTPATIENT
Start: 2022-12-13

## 2022-12-13 ASSESSMENT — ENCOUNTER SYMPTOMS
EYES NEGATIVE: 1
GASTROINTESTINAL NEGATIVE: 1
ALLERGIC/IMMUNOLOGIC NEGATIVE: 1
RESPIRATORY NEGATIVE: 1

## 2022-12-13 NOTE — PROGRESS NOTES
2022    Pat Argueta (:  1950) is a 71 y.o. male, Established patient, here for evaluation of the following chief complaint(s):  New Patient (Maxillary sinusitis, chronic)      Vitals:    22 1343   BP: 120/78   Weight: 266 lb (120.7 kg)   Height: 6' 2\" (1.88 m)       Wt Readings from Last 3 Encounters:   22 266 lb (120.7 kg)   22 262 lb (118.8 kg)   22 265 lb (120.2 kg)       BP Readings from Last 3 Encounters:   22 120/78   22 130/74   22 128/80         SUBJECTIVE/OBJECTIVE:    New patient seen today for sinus issues. He says for years he suffered nasal congestion and occasional sinus headaches. He says he is terrible allergies and takes Claritin and Flonase every day. He also takes Symbicort for his lungs. He had a recent CT scan which shows a maxillary retention cyst on the right that is nonobstructive and some mild sinus disease on the right and the maxillary sinus in the ethmoid. He does complain of fairly chronic nasal congestion. Review of Systems   Constitutional: Negative. HENT:  Positive for congestion. Eyes: Negative. Respiratory: Negative. Cardiovascular: Negative. Gastrointestinal: Negative. Endocrine: Negative. Musculoskeletal: Negative. Skin: Negative. Allergic/Immunologic: Negative. Neurological: Negative. Hematological: Negative. Psychiatric/Behavioral: Negative. Physical Exam  Vitals reviewed. Constitutional:       Appearance: Normal appearance. He is normal weight. HENT:      Head: Normocephalic and atraumatic. Right Ear: Tympanic membrane, ear canal and external ear normal.      Left Ear: Tympanic membrane, ear canal and external ear normal.      Nose: Nose normal.      Mouth/Throat:      Mouth: Mucous membranes are moist.      Pharynx: Oropharynx is clear. Eyes:      Extraocular Movements: Extraocular movements intact.       Pupils: Pupils are equal, round, and reactive to light. Cardiovascular:      Rate and Rhythm: Normal rate and regular rhythm. Pulmonary:      Effort: Pulmonary effort is normal.      Breath sounds: Normal breath sounds. Musculoskeletal:      Cervical back: Normal range of motion. Skin:     General: Skin is warm and dry. Neurological:      General: No focal deficit present. Mental Status: He is alert and oriented to person, place, and time. Psychiatric:         Mood and Affect: Mood normal.         Behavior: Behavior normal.            ASSESSMENT/PLAN:    1. Environmental allergies  2. Acute recurrent maxillary sinusitis  Step will be try to get his allergies better controlled. We talked about how allergies are the generally the baseline problem that leads to sinus issues. We will do this for the next month and a half and see him back. If no better we can talk about minimal sinus surgery on the right. Return in about 6 weeks (around 1/24/2023). An electronic signature was used to authenticate this note. Nicolle Mays MD       Please note that this chart was generated using dragon dictation software. Although every effort was made to ensure the accuracy of this automated transcription, some errors in transcription may have occurred.

## 2023-01-03 ENCOUNTER — PROCEDURE VISIT (OUTPATIENT)
Dept: PULMONOLOGY | Facility: CLINIC | Age: 73
End: 2023-01-03
Payer: MEDICARE

## 2023-01-03 ENCOUNTER — OFFICE VISIT (OUTPATIENT)
Dept: PULMONOLOGY | Facility: CLINIC | Age: 73
End: 2023-01-03
Payer: MEDICARE

## 2023-01-03 VITALS
BODY MASS INDEX: 34.52 KG/M2 | SYSTOLIC BLOOD PRESSURE: 142 MMHG | OXYGEN SATURATION: 98 % | HEIGHT: 74 IN | DIASTOLIC BLOOD PRESSURE: 80 MMHG | WEIGHT: 269 LBS | HEART RATE: 73 BPM

## 2023-01-03 DIAGNOSIS — J44.9 STAGE 2 MODERATE COPD BY GOLD CLASSIFICATION: Primary | ICD-10-CM

## 2023-01-03 DIAGNOSIS — Z86.16 HISTORY OF 2019 NOVEL CORONAVIRUS DISEASE (COVID-19): ICD-10-CM

## 2023-01-03 DIAGNOSIS — G47.33 OBSTRUCTIVE SLEEP APNEA SYNDROME: ICD-10-CM

## 2023-01-03 PROCEDURE — 99213 OFFICE O/P EST LOW 20 MIN: CPT | Performed by: INTERNAL MEDICINE

## 2023-01-03 PROCEDURE — 94010 BREATHING CAPACITY TEST: CPT | Performed by: INTERNAL MEDICINE

## 2023-01-03 PROCEDURE — 1160F RVW MEDS BY RX/DR IN RCRD: CPT | Performed by: INTERNAL MEDICINE

## 2023-01-03 PROCEDURE — 94729 DIFFUSING CAPACITY: CPT | Performed by: INTERNAL MEDICINE

## 2023-01-03 PROCEDURE — 1159F MED LIST DOCD IN RCRD: CPT | Performed by: INTERNAL MEDICINE

## 2023-01-03 NOTE — PROGRESS NOTES
Patient had a negative COVID screening prior to PFT.  Performed PFT. See scanned results for additional information.

## 2023-01-03 NOTE — PROCEDURES
Pulmonary Function Test  Performed by: Rosalba Lujan, RRT  Authorized by: Laron Sparrow MD      Pre Drug % Predicted    FVC: 98%   FEV1: 84%   FEF 25-75%: 62%   FEV1/FVC: 64%   DLCO: 112%   D/VAsb: 116%    Interpretation   Spirometry   Spirometry shows mild obstruction. There is reduced midflow suggesting small airway/airflow obstruction.   Diffusion Capacity  The patient's diffusion capacity is normal.  Diffusion capacity is normal when corrected for alveolar volume.   Overall comments: svc normal, erv reduced.  Stable or improved when compared with prior studies

## 2023-01-03 NOTE — PROGRESS NOTES
Background:  Patient w gold 2 copd   Chief Complaint  COPD    Subjective    History of Present Illness     Wayne Linares presents to [facility].  History of Present Illness  No recent flare ups.  He has contemplated sinus surgery.       Tobacco Use: Low Risk    • Smoking Tobacco Use: Never   • Smokeless Tobacco Use: Never   • Passive Exposure: Not on file      Current Outpatient Medications   Medication Instructions   • aclidinium bromide (TUDORZA PRESSAIR) 400 MCG/ACT aerosol powder  powder for inhalation 1 puff, Inhalation, 2 Times Daily - RT   • albuterol (PROVENTIL HFA;VENTOLIN HFA) 108 (90 BASE) MCG/ACT inhaler Inhalation   • aspirin 81 MG tablet Oral   • EPINEPHrine (EPIPEN) 0.3 MG/0.3ML solution auto-injector injection Intramuscular   • famotidine (PEPCID) 10 mg, Oral, 2 Times Daily   • Farxiga 5 MG tablet tablet No dose, route, or frequency recorded.   • ferrous sulfate 325 (65 FE) MG tablet Oral   • FLUoxetine (PROzac) 20 MG capsule TAKE 1 CAPSULE BY MOUTH DAILY IN THE MORNING   • hydrochlorothiazide (HYDRODIURIL) 12.5 MG tablet No dose, route, or frequency recorded.   • isosorbide mononitrate (IMDUR) 30 MG 24 hr tablet No dose, route, or frequency recorded.   • lansoprazole (PREVACID) 30 MG capsule No dose, route, or frequency recorded.   • levocetirizine (XYZAL) 5 MG tablet Oral   • Omega-3 Fatty Acids (fish oil) 1000 MG capsule capsule Oral, Daily With Breakfast   • pravastatin (PRAVACHOL) 10 mg, Oral, Daily   • predniSONE (DELTASONE) 10 MG tablet Take 2 tabs daily for 7 days then 1 tab daily for 7 days   • propranolol LA (INDERAL LA) 120 MG 24 hr capsule No dose, route, or frequency recorded.   • Semaglutide (OZEMPIC, 1 MG/DOSE, SC) Subcutaneous, Weekly   • SYMBICORT 160-4.5 MCG/ACT inhaler 2 puffs, Inhalation, 2 Times Daily - RT   • tiotropium bromide monohydrate (Spiriva Respimat) 2.5 MCG/ACT aerosol solution inhaler 2 puffs, Inhalation, Daily    • vitamin D (ERGOCALCIFEROL) 38090 UNITS capsule capsule No dose, route, or frequency recorded.      Objective     Vital Signs:   /80   Pulse 73   Ht 188 cm (74\")   Wt 122 kg (269 lb)   SpO2 98% Comment: RA  BMI 34.54 kg/m²   Physical Exam  Constitutional:       Appearance: Normal appearance. He is not ill-appearing or diaphoretic.   Eyes:      Extraocular Movements: Extraocular movements intact.   Pulmonary:      Effort: Pulmonary effort is normal. No respiratory distress.      Breath sounds: No wheezing, rhonchi or rales.   Skin:     Findings: No erythema or rash.   Neurological:      Mental Status: He is alert.        Result Review  Data Reviewed:         PFT Values        Some values may be hidden. Unless noted otherwise, only the newest values recorded on each date are displayed.         Old Values PFT Results 8/9/21 1/3/23   No data to display.      Pre Drug PFT Results 8/9/21 1/3/23   FVC 93 98   FEV1 77 84   FEF 25-75% 42 62   FEV1/FVC 65.66 64      Post Drug PFT Results 8/9/21 1/3/23   No data to display.      Other Tests PFT Results 8/9/21 1/3/23   DLCO  112   D/VAsb  116                      Assessment and Plan    Diagnoses and all orders for this visit:    1. Stage 2 moderate COPD by GOLD classification (HCC) (Primary)  -     Pulmonary Function Test    2. History of 2019 novel coronavirus disease (COVID-19)    3. Obstructive sleep apnea syndrome    his fev1 is normal, now c/w stage 1  He will have acceptable pulmonary risk for sinus surgery if needed  Cliff stable.  No long Covid apparent    Follow Up   Return in about 1 year (around 1/3/2024) for spirometry.  Patient was given instructions and counseling regarding his condition or for health maintenance advice. Please see specific information pulled into the AVS if appropriate.    Electronically signed by Laron Sparrow MD, 1/3/2023, 20:15 CST

## 2023-02-07 ENCOUNTER — OFFICE VISIT (OUTPATIENT)
Dept: ENT CLINIC | Age: 73
End: 2023-02-07
Payer: MEDICARE

## 2023-02-07 VITALS
HEIGHT: 74 IN | DIASTOLIC BLOOD PRESSURE: 76 MMHG | WEIGHT: 268 LBS | BODY MASS INDEX: 34.39 KG/M2 | SYSTOLIC BLOOD PRESSURE: 120 MMHG

## 2023-02-07 DIAGNOSIS — Z91.09 ENVIRONMENTAL ALLERGIES: ICD-10-CM

## 2023-02-07 DIAGNOSIS — R05.3 CHRONIC COUGH: Primary | ICD-10-CM

## 2023-02-07 DIAGNOSIS — R09.82 PND (POST-NASAL DRIP): ICD-10-CM

## 2023-02-07 PROCEDURE — G8484 FLU IMMUNIZE NO ADMIN: HCPCS | Performed by: OTOLARYNGOLOGY

## 2023-02-07 PROCEDURE — 3078F DIAST BP <80 MM HG: CPT | Performed by: OTOLARYNGOLOGY

## 2023-02-07 PROCEDURE — 1123F ACP DISCUSS/DSCN MKR DOCD: CPT | Performed by: OTOLARYNGOLOGY

## 2023-02-07 PROCEDURE — 3017F COLORECTAL CA SCREEN DOC REV: CPT | Performed by: OTOLARYNGOLOGY

## 2023-02-07 PROCEDURE — G8417 CALC BMI ABV UP PARAM F/U: HCPCS | Performed by: OTOLARYNGOLOGY

## 2023-02-07 PROCEDURE — G8427 DOCREV CUR MEDS BY ELIG CLIN: HCPCS | Performed by: OTOLARYNGOLOGY

## 2023-02-07 PROCEDURE — 99214 OFFICE O/P EST MOD 30 MIN: CPT | Performed by: OTOLARYNGOLOGY

## 2023-02-07 PROCEDURE — 3074F SYST BP LT 130 MM HG: CPT | Performed by: OTOLARYNGOLOGY

## 2023-02-07 PROCEDURE — 1036F TOBACCO NON-USER: CPT | Performed by: OTOLARYNGOLOGY

## 2023-02-07 RX ORDER — IPRATROPIUM BROMIDE 21 UG/1
2 SPRAY, METERED NASAL EVERY 12 HOURS
Qty: 30 ML | Refills: 3 | Status: SHIPPED | OUTPATIENT
Start: 2023-02-07

## 2023-02-07 ASSESSMENT — ENCOUNTER SYMPTOMS
GASTROINTESTINAL NEGATIVE: 1
ALLERGIC/IMMUNOLOGIC NEGATIVE: 1
COUGH: 1
EYES NEGATIVE: 1

## 2023-02-07 NOTE — PROGRESS NOTES
2023    Gaurang De La Rosa (:  1950) is a 71 y.o. male, Established patient, here for evaluation of the following chief complaint(s):  Follow-up (Allergies)      Vitals:    23 1430   BP: 120/76   Weight: 268 lb (121.6 kg)   Height: 6' 2\" (1.88 m)       Wt Readings from Last 3 Encounters:   23 268 lb (121.6 kg)   22 266 lb (120.7 kg)   22 262 lb (118.8 kg)       BP Readings from Last 3 Encounters:   23 120/76   22 120/78   22 130/74         SUBJECTIVE/OBJECTIVE:    Patient seen today for allergies and a chronic cough. I placed him on montelukast and changing to Allegra as well as Astelin. He says they are not helping. He says Claritin helped better than the Allegra. The Astelin does not seem to be helping the postnasal drip. He still has a chronic cough. He does see a pulmonologist who said he has [facility]. He is recently been on antibiotics and steroids with no benefit. Review of Systems   Constitutional: Negative. HENT:  Positive for postnasal drip. Eyes: Negative. Respiratory:  Positive for cough. Cardiovascular: Negative. Gastrointestinal: Negative. Endocrine: Negative. Musculoskeletal: Negative. Skin: Negative. Allergic/Immunologic: Negative. Neurological: Negative. Hematological: Negative. Psychiatric/Behavioral: Negative. Physical Exam  Vitals reviewed. Constitutional:       Appearance: Normal appearance. He is normal weight. HENT:      Head: Normocephalic and atraumatic. Right Ear: Tympanic membrane, ear canal and external ear normal.      Left Ear: Tympanic membrane, ear canal and external ear normal.      Nose: Nose normal.      Mouth/Throat:      Mouth: Mucous membranes are moist.      Pharynx: Oropharynx is clear. Eyes:      Extraocular Movements: Extraocular movements intact. Pupils: Pupils are equal, round, and reactive to light.    Cardiovascular:      Rate and Rhythm: Normal rate and regular rhythm. Pulmonary:      Effort: Pulmonary effort is normal.      Breath sounds: Normal breath sounds. Musculoskeletal:      Cervical back: Normal range of motion. Skin:     General: Skin is warm and dry. Neurological:      General: No focal deficit present. Mental Status: He is alert and oriented to person, place, and time. Psychiatric:         Mood and Affect: Mood normal.         Behavior: Behavior normal.            ASSESSMENT/PLAN:    1. Chronic cough  2. Environmental allergies  3. PND (post-nasal drip)  I recommend he start Claritin back up and changes Astelin and ipratropium to help dry up the postnasal drip. He should also see his pulmonologist as this cough has been chronic and I think it is multifactorial.    Return in about 4 weeks (around 3/7/2023). An electronic signature was used to authenticate this note. Zan Duffy MD       Please note that this chart was generated using dragon dictation software. Although every effort was made to ensure the accuracy of this automated transcription, some errors in transcription may have occurred.

## 2023-02-11 NOTE — PROGRESS NOTES
" BERNARDINO Finley  [facility]   Pulmonary and Critical Care  [address]  [place] [address]  Phone: [phone]  Fax: [phone]           Chief Complaint  Increased coughing (Been \"sick\" for over a month - clear drainage, constant cough.  Been on 2 rounds of antibiotics and steroids.)    Subjective    History of Present Illness     Wayne Linares presents to [facility] PULMONARY & CRITICAL CARE MEDICINE   History of Present Illness  Mr. Linares is a 72 year old male patient of Dr. Sparrow's with known COPD, CAD, DM, HTN, RBBB, gastric bypass, history of covid Sept 2022 and DEUCE-CPAP. He is compliant with his CPAP. He was just seen by Dr. Sparrow last month with no recent flare ups. His PFT at that time showed mild obstruction, normal diffusion capacity and stable compared to last PFT in August 2021. He is here today with complaints of increased cough. He follows with Navneet Us PA-C at [facility]. He was last seen in Nov at which time it was recommended he see Dr. Rico for consideration of sinus surgery. He had a CT of the sinuses prior to this recommendations that showed an opacification of the right maxiallary sinus for which he was treated with 3 weeks of Ceftin. Mr. Rogers felt at that time his breathing issues were likely from his chronic sinusitis. He then had been given steroids for which he only took one dose and quit due to not sleeping. He has seen Dr. Rico who has been making adjustments in his allergy medications. He noted if he would try to get his allergies under control then consider surgery. Patient has also had medications from his PCP as well as Dr. Strong who gave him a shot of steroids and shot of antibiotics. He was also given a script for antibiotics and steroids. He has had significant drainage from his sinus. He uses Symbicort & Spiriva for which he finds not much benefit. He has not used the albuterol HFA. His wheezing is " "better now. He has used the nebulizer in the past every time he gets these upper respiratory infections. He is on Pepcid for his allergies. He does eat/ drink 2-3 hours prior to bed. When discussing the affects of this he does note he has had issues with Barrettes Esophagus. His cough began in the last 4 weeks and is described as productive clear thick mucus. He tried mucinex and this was of no benefit. His recent chest imaging showed no acute process. He is not on an Ace Inhibitor.          Objective   Vital Signs:   /72   Pulse 78   Ht 188 cm (74\")   Wt 121 kg (267 lb 6.4 oz)   SpO2 96% Comment: RA  BMI 34.33 kg/m²     Physical Exam  Vitals reviewed.   Constitutional:       Appearance: Normal appearance.   HENT:      Head: Normocephalic and atraumatic.   Cardiovascular:      Rate and Rhythm: Normal rate and regular rhythm.   Pulmonary:      Effort: Pulmonary effort is normal.      Breath sounds: Normal breath sounds.   Neurological:      General: No focal deficit present.      Mental Status: He is alert and oriented to person, place, and time.   Psychiatric:         Mood and Affect: Mood normal.         Behavior: Behavior normal.          Result Review :  The following data was reviewed by: BERNARDINO Finley on 02/13/2023:    My interpretation of imaging:  No new   My interpretation of labs: none     PFT Values        Some values may be hidden. Unless noted otherwise, only the newest values recorded on each date are displayed.         Old Values PFT Results 8/9/21 1/3/23   No data to display.      Pre Drug PFT Results 8/9/21 1/3/23   FVC 93 98   FEV1 77 84   FEF 25-75% 42 62   FEV1/FVC 65.66 64      Post Drug PFT Results 8/9/21 1/3/23   No data to display.      Other Tests PFT Results 8/9/21 1/3/23   DLCO  112   D/VAsb  116           My interpretation of the PFT: no new     Results for orders placed in visit on 01/03/23    Pulmonary Function Test    Narrative  Pulmonary Function Test  Performed " by: Rosalba Lujan, RRT  Authorized by: Laron Sparrow MD    Pre Drug % Predicted  FVC: 98%  FEV1: 84%  FEF 25-75%: 62%  FEV1/FVC: 64%  DLCO: 112%  D/VAsb: 116%    Interpretation  Spirometry  Spirometry shows mild obstruction. There is reduced midflow suggesting small airway/airflow obstruction.  Diffusion Capacity  The patient's diffusion capacity is normal.  Diffusion capacity is normal when corrected for alveolar volume.  Overall comments: svc normal, erv reduced.  Stable or improved when compared with prior studies      Results for orders placed in visit on 08/09/21    Pulmonary Function Test    Narrative  Pulmonary Function Test  Performed by: Rosalba Lujan RRT  Authorized by: Laron Sparrow MD    Pre Drug % Predicted  FVC: 93%  FEV1: 77%  FEF 25-75%: 42%  FEV1/FVC: 65.66%    Interpretation  Spirometry  Spirometry shows moderate obstruction.  Review of FVL curve  Patient's effort is normal.      Results for orders placed in visit on 06/04/19    Pulmonary Function Test          Assessment and Plan   Diagnoses and all orders for this visit:    1. Cough, unspecified type (Primary)    2. Stage 2 moderate COPD by GOLD classification (HCC)    3. Obstructive sleep apnea syndrome    4. Gastroesophageal reflux disease, unspecified whether esophagitis present        We discussed and reviewed all his recent treatment and work up from ENT at [facility]. His PFT has been stable since August 2021. His cough is aggravated by his sinus drainage and he has no indications of infection. We discussed sputum culture however he again states his drainage is clear and no signs of infection. I did advise him to avoid eating or drinking 2-3 hours prior to laying down. I also provided him with some handouts. I have recommended he keep his upcoming appointment with Dr. Rico and discuss possible surgery. Keep Follow up with Dr. Sparrow as scheduled in one year. We also discussed that Dr. Sparrow mentioned in  his last note that there was no apparent long covid.     Follow Up   No follow-ups on file.  Patient was given instructions and counseling regarding his condition or for health maintenance advice. Please see specific information pulled into the AVS if appropriate.     Argenis Lopez, APRN  2/13/2023  15:32 CST

## 2023-02-13 ENCOUNTER — OFFICE VISIT (OUTPATIENT)
Dept: PULMONOLOGY | Facility: CLINIC | Age: 73
End: 2023-02-13
Payer: MEDICARE

## 2023-02-13 VITALS
WEIGHT: 267.4 LBS | DIASTOLIC BLOOD PRESSURE: 72 MMHG | HEIGHT: 74 IN | HEART RATE: 78 BPM | OXYGEN SATURATION: 96 % | BODY MASS INDEX: 34.32 KG/M2 | SYSTOLIC BLOOD PRESSURE: 120 MMHG

## 2023-02-13 DIAGNOSIS — K21.9 GASTROESOPHAGEAL REFLUX DISEASE, UNSPECIFIED WHETHER ESOPHAGITIS PRESENT: ICD-10-CM

## 2023-02-13 DIAGNOSIS — R05.9 COUGH, UNSPECIFIED TYPE: Primary | ICD-10-CM

## 2023-02-13 DIAGNOSIS — J44.9 STAGE 2 MODERATE COPD BY GOLD CLASSIFICATION: ICD-10-CM

## 2023-02-13 DIAGNOSIS — G47.33 OBSTRUCTIVE SLEEP APNEA SYNDROME: ICD-10-CM

## 2023-02-13 PROCEDURE — 99214 OFFICE O/P EST MOD 30 MIN: CPT | Performed by: NURSE PRACTITIONER

## 2023-02-13 RX ORDER — IPRATROPIUM BROMIDE 21 UG/1
SPRAY, METERED NASAL
COMMUNITY
Start: 2023-02-07

## 2023-02-13 RX ORDER — LORATADINE 10 MG/1
10 TABLET ORAL DAILY
COMMUNITY

## 2023-02-13 RX ORDER — MONTELUKAST SODIUM 10 MG/1
10 TABLET ORAL NIGHTLY
COMMUNITY
Start: 2023-02-08

## 2023-02-13 RX ORDER — ALBUTEROL SULFATE 2.5 MG/3ML
2.5 SOLUTION RESPIRATORY (INHALATION) EVERY 4 HOURS PRN
COMMUNITY

## 2023-02-13 RX ORDER — PROMETHAZINE HYDROCHLORIDE AND CODEINE PHOSPHATE 6.25; 1 MG/5ML; MG/5ML
SYRUP ORAL
COMMUNITY
Start: 2023-02-10

## 2023-03-15 ENCOUNTER — OFFICE VISIT (OUTPATIENT)
Dept: ENT CLINIC | Age: 73
End: 2023-03-15
Payer: MEDICARE

## 2023-03-15 VITALS
DIASTOLIC BLOOD PRESSURE: 74 MMHG | HEIGHT: 74 IN | BODY MASS INDEX: 34.01 KG/M2 | WEIGHT: 265 LBS | SYSTOLIC BLOOD PRESSURE: 118 MMHG

## 2023-03-15 DIAGNOSIS — J34.3 HYPERTROPHY OF INFERIOR NASAL TURBINATE: ICD-10-CM

## 2023-03-15 DIAGNOSIS — Z91.09 ENVIRONMENTAL ALLERGIES: ICD-10-CM

## 2023-03-15 DIAGNOSIS — R09.81 NASAL CONGESTION: Primary | ICD-10-CM

## 2023-03-15 DIAGNOSIS — G47.33 OBSTRUCTIVE SLEEP APNEA SYNDROME: ICD-10-CM

## 2023-03-15 PROCEDURE — 1123F ACP DISCUSS/DSCN MKR DOCD: CPT | Performed by: OTOLARYNGOLOGY

## 2023-03-15 PROCEDURE — G8417 CALC BMI ABV UP PARAM F/U: HCPCS | Performed by: OTOLARYNGOLOGY

## 2023-03-15 PROCEDURE — 99213 OFFICE O/P EST LOW 20 MIN: CPT | Performed by: OTOLARYNGOLOGY

## 2023-03-15 PROCEDURE — 3078F DIAST BP <80 MM HG: CPT | Performed by: OTOLARYNGOLOGY

## 2023-03-15 PROCEDURE — G8427 DOCREV CUR MEDS BY ELIG CLIN: HCPCS | Performed by: OTOLARYNGOLOGY

## 2023-03-15 PROCEDURE — G8484 FLU IMMUNIZE NO ADMIN: HCPCS | Performed by: OTOLARYNGOLOGY

## 2023-03-15 PROCEDURE — 3017F COLORECTAL CA SCREEN DOC REV: CPT | Performed by: OTOLARYNGOLOGY

## 2023-03-15 PROCEDURE — 3074F SYST BP LT 130 MM HG: CPT | Performed by: OTOLARYNGOLOGY

## 2023-03-15 PROCEDURE — 1036F TOBACCO NON-USER: CPT | Performed by: OTOLARYNGOLOGY

## 2023-03-15 ASSESSMENT — ENCOUNTER SYMPTOMS
EYES NEGATIVE: 1
ALLERGIC/IMMUNOLOGIC NEGATIVE: 1
GASTROINTESTINAL NEGATIVE: 1
RESPIRATORY NEGATIVE: 1

## 2023-03-15 NOTE — PROGRESS NOTES
3/15/2023    Aniket White (:  1950) is a 71 y.o. male, Established patient, here for evaluation of the following chief complaint(s):  Follow-up (Cough)      Vitals:    03/15/23 1330   BP: 118/74   Weight: 265 lb (120.2 kg)   Height: 6' 2\" (1.88 m)       Wt Readings from Last 3 Encounters:   03/15/23 265 lb (120.2 kg)   23 268 lb (121.6 kg)   22 266 lb (120.7 kg)       BP Readings from Last 3 Encounters:   03/15/23 118/74   23 120/76   22 120/78         SUBJECTIVE/OBJECTIVE:    Patient seen today for nasal congestion and sleep apnea. I have him on allergy medications which helped somewhat but he says using a Vicks inhaler before he goes to bed helped and uses CPAP better. He says a decrease of the congestion. Review of Systems   Constitutional: Negative. HENT:  Positive for congestion and hearing loss. Eyes: Negative. Respiratory: Negative. Cardiovascular: Negative. Gastrointestinal: Negative. Endocrine: Negative. Musculoskeletal: Negative. Skin: Negative. Allergic/Immunologic: Negative. Neurological: Negative. Hematological: Negative. Psychiatric/Behavioral: Negative. Physical Exam  Vitals reviewed. Constitutional:       Appearance: Normal appearance. He is normal weight. HENT:      Head: Normocephalic and atraumatic. Right Ear: Tympanic membrane, ear canal and external ear normal.      Left Ear: Tympanic membrane, ear canal and external ear normal.      Nose: Nose normal.      Mouth/Throat:      Mouth: Mucous membranes are moist.      Pharynx: Oropharynx is clear. Eyes:      Extraocular Movements: Extraocular movements intact. Pupils: Pupils are equal, round, and reactive to light. Cardiovascular:      Rate and Rhythm: Normal rate and regular rhythm. Pulmonary:      Effort: Pulmonary effort is normal.      Breath sounds: Normal breath sounds. Musculoskeletal:      Cervical back: Normal range of motion. Skin:     General: Skin is warm and dry. Neurological:      General: No focal deficit present. Mental Status: He is alert and oriented to person, place, and time. Psychiatric:         Mood and Affect: Mood normal.         Behavior: Behavior normal.            ASSESSMENT/PLAN:    1. Nasal congestion  2. Environmental allergies  3. Obstructive sleep apnea syndrome  4. Hypertrophy of inferior nasal turbinate  We talked about how treating his turbinates would certainly help him permanently but he wants to get his hip replaced first.  He will let me know when he heals from this we can set him up for that as well. For now continue the Vicks and  the other medications. Return if symptoms worsen or fail to improve. An electronic signature was used to authenticate this note. Libertad Martinez MD       Please note that this chart was generated using dragon dictation software. Although every effort was made to ensure the accuracy of this automated transcription, some errors in transcription may have occurred.

## 2023-05-30 ENCOUNTER — TELEPHONE (OUTPATIENT)
Dept: CARDIOLOGY CLINIC | Age: 73
End: 2023-05-30

## 2023-05-31 ENCOUNTER — OFFICE VISIT (OUTPATIENT)
Dept: CARDIOLOGY CLINIC | Age: 73
End: 2023-05-31
Payer: MEDICARE

## 2023-05-31 VITALS
SYSTOLIC BLOOD PRESSURE: 120 MMHG | HEIGHT: 74 IN | BODY MASS INDEX: 33.37 KG/M2 | DIASTOLIC BLOOD PRESSURE: 74 MMHG | WEIGHT: 260 LBS

## 2023-05-31 DIAGNOSIS — Z99.89 OSA ON CPAP: ICD-10-CM

## 2023-05-31 DIAGNOSIS — I10 PRIMARY HYPERTENSION: ICD-10-CM

## 2023-05-31 DIAGNOSIS — G47.33 OSA ON CPAP: ICD-10-CM

## 2023-05-31 DIAGNOSIS — I25.10 CORONARY ARTERY DISEASE INVOLVING NATIVE CORONARY ARTERY OF NATIVE HEART WITHOUT ANGINA PECTORIS: Primary | ICD-10-CM

## 2023-05-31 PROCEDURE — 99214 OFFICE O/P EST MOD 30 MIN: CPT | Performed by: CLINICAL NURSE SPECIALIST

## 2023-05-31 PROCEDURE — 1123F ACP DISCUSS/DSCN MKR DOCD: CPT | Performed by: CLINICAL NURSE SPECIALIST

## 2023-05-31 PROCEDURE — G8427 DOCREV CUR MEDS BY ELIG CLIN: HCPCS | Performed by: CLINICAL NURSE SPECIALIST

## 2023-05-31 PROCEDURE — 1036F TOBACCO NON-USER: CPT | Performed by: CLINICAL NURSE SPECIALIST

## 2023-05-31 PROCEDURE — 3078F DIAST BP <80 MM HG: CPT | Performed by: CLINICAL NURSE SPECIALIST

## 2023-05-31 PROCEDURE — 3074F SYST BP LT 130 MM HG: CPT | Performed by: CLINICAL NURSE SPECIALIST

## 2023-05-31 PROCEDURE — G8417 CALC BMI ABV UP PARAM F/U: HCPCS | Performed by: CLINICAL NURSE SPECIALIST

## 2023-05-31 PROCEDURE — 3017F COLORECTAL CA SCREEN DOC REV: CPT | Performed by: CLINICAL NURSE SPECIALIST

## 2023-05-31 RX ORDER — TIRZEPATIDE 7.5 MG/.5ML
INJECTION, SOLUTION SUBCUTANEOUS
COMMUNITY
Start: 2023-03-21

## 2023-05-31 RX ORDER — MELOXICAM 7.5 MG/1
TABLET ORAL
COMMUNITY
Start: 2023-05-31

## 2023-05-31 NOTE — PATIENT INSTRUCTIONS
Maintain good blood pressure control-goal<130/80 at rest  Maintain good cholesterol control LDL goal<70 with arterial disease  If you are diabetic work to keep/obtain hemoglobin A1c< 7    Follow up in 6-9 mos   Call with any questions or concerns  Follow up with Chris Cordon MD for non cardiac problems  Report any new problems  Cardiovascular Fitness-Exercise as tolerated. Strive for 30 minutes of exercise most days of the week. Cardiac / Healthy Diet- Avoid processed high fat foods, maintain low sodium/salt   Continue current medications as directed  Continue plan of treatment  It is always recommended that you bring your medications bottles with you to each visit - this is for your safety!

## 2023-05-31 NOTE — PROGRESS NOTES
[facility] Cardiology  [facility], [address]  [address]  Phone: [phone]  Fax: [phone]    OFFICE VISIT:  2023    Almita Shrestha - : 1950    Reason For Visit:  Jewel Barrett is a 71 y.o. male who is here for Follow-up (No symptoms), Coronary Artery Disease, and Hypertension  1. Coronary artery disease, abnormal dobutamine stress echo with NSVT, cardiac catheterization 2020 with mid LAD 55% stenosis, IFR 0.92, medically managed, negative Lexiscan study 3/6/2021, normal LV ejection fraction. 2.  Non-insulin-dependent diabetes mellitus. 3.  Hypertension. 4.  Severe obesity with ELROY. -Wearing CPAP     He returns today for routine follow-up. No cardiac complaints. He has been fairly active with exception of hip pain. He is lost weight and his blood sugars been better controlled. Chelsie Katz denies exertional chest pain, shortness of breath, orthopnea, paroxysmal nocturnal dyspnea, syncope, presyncope, arrhythmia, edema and fatigue. The patient denies numbness or weakness to suggest cerebrovascular accident or transient ischemic attack. Jessica Delatorre MD is PCP and follows labs including lipids.   He is on Manjaro now and last A1c down from 8 to 7.2    Almita Shrestha has the following history as recorded in [facility]:    Patient Active Problem List    Diagnosis Date Noted    Syncope     Nasal obstruction 2022    Maxillary sinus cyst 2022    Chest pain 2021    Ventricular tachyarrhythmia ([address] 2020    V-tach ([address] 2020    Right ventricular enlargement 2019    Biatrial enlargement 2019    Type 2 diabetes mellitus with complication, without long-term current use of insulin ([address] 2019    Sleep apnea, obstructive 2017    Coronary atherosclerosis due to calcified coronary lesion of native artery     Hypertension     Class 2 obesity due to excess calories without serious comorbidity with body mass index (BMI) of 38.0

## 2023-06-02 ENCOUNTER — TELEPHONE (OUTPATIENT)
Dept: GASTROENTEROLOGY | Facility: CLINIC | Age: 73
End: 2023-06-02

## 2023-07-31 RX ORDER — PROPRANOLOL HYDROCHLORIDE 120 MG/1
CAPSULE, EXTENDED RELEASE ORAL
Qty: 90 CAPSULE | Refills: 3 | Status: SHIPPED | OUTPATIENT
Start: 2023-07-31

## 2023-09-21 ENCOUNTER — OFFICE VISIT (OUTPATIENT)
Dept: NEUROLOGY | Age: 73
End: 2023-09-21
Payer: MEDICARE

## 2023-09-21 VITALS
SYSTOLIC BLOOD PRESSURE: 106 MMHG | HEART RATE: 68 BPM | RESPIRATION RATE: 18 BRPM | HEIGHT: 74 IN | DIASTOLIC BLOOD PRESSURE: 70 MMHG | WEIGHT: 250 LBS | BODY MASS INDEX: 32.08 KG/M2

## 2023-09-21 DIAGNOSIS — G47.33 SLEEP APNEA, OBSTRUCTIVE: Primary | ICD-10-CM

## 2023-09-21 DIAGNOSIS — G25.0 ESSENTIAL TREMOR: ICD-10-CM

## 2023-09-21 PROCEDURE — 1123F ACP DISCUSS/DSCN MKR DOCD: CPT | Performed by: PSYCHIATRY & NEUROLOGY

## 2023-09-21 PROCEDURE — 3078F DIAST BP <80 MM HG: CPT | Performed by: PSYCHIATRY & NEUROLOGY

## 2023-09-21 PROCEDURE — G8427 DOCREV CUR MEDS BY ELIG CLIN: HCPCS | Performed by: PSYCHIATRY & NEUROLOGY

## 2023-09-21 PROCEDURE — 1036F TOBACCO NON-USER: CPT | Performed by: PSYCHIATRY & NEUROLOGY

## 2023-09-21 PROCEDURE — 99213 OFFICE O/P EST LOW 20 MIN: CPT | Performed by: PSYCHIATRY & NEUROLOGY

## 2023-09-21 PROCEDURE — G8417 CALC BMI ABV UP PARAM F/U: HCPCS | Performed by: PSYCHIATRY & NEUROLOGY

## 2023-09-21 PROCEDURE — 3017F COLORECTAL CA SCREEN DOC REV: CPT | Performed by: PSYCHIATRY & NEUROLOGY

## 2023-09-21 PROCEDURE — 3074F SYST BP LT 130 MM HG: CPT | Performed by: PSYCHIATRY & NEUROLOGY

## 2023-09-21 RX ORDER — PRIMIDONE 50 MG/1
150 TABLET ORAL NIGHTLY
Qty: 90 TABLET | Refills: 5 | Status: SHIPPED | OUTPATIENT
Start: 2023-09-21

## 2023-09-21 NOTE — PATIENT INSTRUCTIONS
INSTRUCTIONS:  Start Primidone 50 mg, 1 at bed time for a week, then 2 at bedtime for a week, then 3 at bedtime.

## 2023-09-21 NOTE — PROGRESS NOTES
[facility] Neurology  [address]  [facility], [address] [place] [address]  Phone [phone]  Fax [phone]     [facility] Neurology Follow Up Encounter  23 10:25 AM CDT    Information:   Patient Name: Carlton Pagan  :   1950  Age:   68 y.o. MRN:   [id number]  Account #:  [de-identified]  Today:  23    Provider: Dariana Quezada M.D. Chief Complaint:   Chief Complaint   Patient presents with    Follow-up    Sleep Apnea       Subjective:   Carlton Pagan is a 68 y.o. man with a history of essential tremor and obstructive sleep apnea who is following up. He continues to do well. He has an aggravating tremor in his hands and now head. He has tried primidone, gabapentin, and Valium. He cannot take Topamax due to kidney stones. He is still considering DBS. He uses his CPAP nightly. He rests well. He has had no daytime drowsiness.         Objective:     Past Medical History:  Past Medical History:   Diagnosis Date    Allergic rhinitis     Clark esophagus     Chronic fatigue syndrome     COPD (chronic obstructive pulmonary disease) (HCC)     Coronary artery disease involving native coronary artery of native heart without angina pectoris     Coronary atherosclerosis due to calcified coronary lesion of native artery     Gastritis     GERD (gastroesophageal reflux disease)     Glucose intolerance (impaired glucose tolerance)     History of knee sprain     Hyperlipidemia     Hypertension     Kidney stones     Non-insulin dependent type 2 diabetes mellitus ([address])     Obesity     ELROY on CPAP     Tremor        Past Surgical History:   Procedure Laterality Date    BARIATRIC SURGERY  2005    CARDIAC CATHETERIZATION  1998    no blockages    CARDIAC CATHETERIZATION  2020    LAD 60%, EF 60%    CATARACT REMOVAL WITH IMPLANT Bilateral 2021    COLONOSCOPY  2006    ELBOW SURGERY Right 2013    HIP SURGERY Right     TOTAL KNEE ARTHROPLASTY Right 2013    UPPER GASTROINTESTINAL

## 2023-10-30 NOTE — PATIENT INSTRUCTIONS
Plan  Continue good BP , glucase and cholesterol control   Follow up in 6-9 mos  With Dr. Sondra Larios   Call with any questions or concerns  Follow up with Wily Oneill MD for non cardiac problems  Report any new problems  Cardiovascular Fitness-Exercise as tolerated. Strive for 30 minutes of exercise most days of the week. Cardiac / Healthy Diet  Continue current medications as directed  Continue plan of treatment  It is always recommended that you bring your medications bottles with you to each visit - this is for your safety! Patient Education        Learning About Coronary Artery Disease (CAD)  What is coronary artery disease? Coronary artery disease (CAD) occurs when plaque builds up in the arteries that bring oxygen-rich blood to your heart. Plaque is a fatty substance made of cholesterol, calcium, and other substances in the blood. This process is called hardening of the arteries, or atherosclerosis. What happens when you have coronary artery disease? · Plaque may narrow the coronary arteries. Narrowed arteries cause poor blood flow. This can lead to angina symptoms such as chest pain or discomfort. If blood flow is completely blocked, you could have a heart attack. · You can slow CAD and reduce the risk of future problems by making changes in your lifestyle. These include quitting smoking and eating heart-healthy foods. · Treatments for CAD, along with changes in your lifestyle, can help you live a longer and healthier life. How can you prevent coronary artery disease? · Do not smoke. It may be the best thing you can do to prevent heart disease. If you need help quitting, talk to your doctor about stop-smoking programs and medicines. These can increase your chances of quitting for good. · Be active. Get at least 30 minutes of exercise on most days of the week. Walking is a good choice.  You also may want to do other activities, such as running, swimming, cycling, or playing tennis or team Detail Level: Zone Detail Level: Detailed

## 2024-01-03 ENCOUNTER — OFFICE VISIT (OUTPATIENT)
Dept: PULMONOLOGY | Facility: CLINIC | Age: 74
End: 2024-01-03
Payer: MEDICARE

## 2024-01-03 VITALS
HEIGHT: 74 IN | BODY MASS INDEX: 34.91 KG/M2 | DIASTOLIC BLOOD PRESSURE: 78 MMHG | SYSTOLIC BLOOD PRESSURE: 124 MMHG | OXYGEN SATURATION: 97 % | HEART RATE: 60 BPM | WEIGHT: 272 LBS

## 2024-01-03 DIAGNOSIS — J44.9 STAGE 2 MODERATE COPD BY GOLD CLASSIFICATION: Primary | ICD-10-CM

## 2024-01-03 DIAGNOSIS — K21.9 GASTROESOPHAGEAL REFLUX DISEASE, UNSPECIFIED WHETHER ESOPHAGITIS PRESENT: ICD-10-CM

## 2024-01-03 DIAGNOSIS — Z29.11 NEED FOR PROPHYLACTIC VACCINATION AND INOCULATION AGAINST RESPIRATORY SYNCYTIAL VIRUS (RSV): ICD-10-CM

## 2024-01-03 DIAGNOSIS — Z86.16 HISTORY OF 2019 NOVEL CORONAVIRUS DISEASE (COVID-19): ICD-10-CM

## 2024-01-03 RX ORDER — DIAZEPAM 2 MG/1
4 TABLET ORAL
COMMUNITY
Start: 2023-12-19

## 2024-01-03 NOTE — PROCEDURES
Spirometry    Performed by: Rosalba Lujan, RRT  Authorized by: Laron Sparrow MD     Pre Drug % Predicted    FVC: 84%   FEV1: 69%   FEF 25-75%: 48%   FEV1/FVC: 61%    Interpretation   Spirometry   Spirometry shows moderate obstruction. There is reduced midflow suggesting small airway/airflow obstruction.   Overall comments: Results are lower than last study in 2923 but improved compared with priors.

## 2024-01-03 NOTE — PROGRESS NOTES
Background:  Patient w gold 2 copd   Chief Complaint  COPD    Subjective    History of Present Illness     Wayne Linares is here for follow up with [facility] PULMONARY & CRITICAL CARE MEDICINE.  History of Present Illness  He has not been using his inhalers so much not feeling like they have been needed.  He has head and sinus problems undergoing workup.  He is taking propranolol for essential tremor.     Tobacco Use: Low Risk  (1/3/2024)    Patient History     Smoking Tobacco Use: Never     Smokeless Tobacco Use: Never     Passive Exposure: Past      Current Outpatient Medications   Medication Instructions    aclidinium bromide (TUDORZA PRESSAIR) 400 MCG/ACT aerosol powder  powder for inhalation 1 puff, Inhalation, 2 Times Daily - RT    albuterol (PROVENTIL HFA;VENTOLIN HFA) 108 (90 BASE) MCG/ACT inhaler Inhalation    albuterol (PROVENTIL) 2.5 mg, Nebulization, Every 4 Hours PRN    aspirin 81 MG tablet Oral    diazePAM (VALIUM) 4 mg    EPINEPHrine (EPIPEN) 0.3 MG/0.3ML solution auto-injector injection Intramuscular    famotidine (PEPCID) 10 mg, Oral, 2 Times Daily    Farxiga 5 MG tablet tablet No dose, route, or frequency recorded.    ferrous sulfate 325 (65 FE) MG tablet Oral    FLUoxetine (PROzac) 20 MG capsule TAKE 1 CAPSULE BY MOUTH DAILY IN THE MORNING    hydrochlorothiazide (HYDRODIURIL) 12.5 MG tablet No dose, route, or frequency recorded.    ipratropium (ATROVENT) 0.03 % nasal spray SPRAY 2 SPRAYS BY EACH NOSTRIL ROUTE IN THE MORNING AND 2 SPRAYS IN THE EVENING.    isosorbide mononitrate (IMDUR) 30 MG 24 hr tablet No dose, route, or frequency recorded.    lansoprazole (PREVACID) 30 MG capsule No dose, route, or frequency recorded.    loratadine (CLARITIN) 10 mg, Oral, Daily    montelukast (SINGULAIR) 10 mg, Oral, Nightly    Omega-3 Fatty Acids (fish oil) 1000 MG capsule capsule Oral, Daily With Breakfast    pravastatin (PRAVACHOL) 10 mg, Oral, Daily    promethazine-codeine (PHENERGAN  "with CODEINE) 6.25-10 MG/5ML syrup TAKE 5 ML BY MOUTH AS NEEDED EVERY 6 HOURS FOR 10 DAYS    propranolol LA (INDERAL LA) 120 MG 24 hr capsule No dose, route, or frequency recorded.    Semaglutide (OZEMPIC, 1 MG/DOSE, SC) Subcutaneous, Weekly    SYMBICORT 160-4.5 MCG/ACT inhaler 2 puffs, Inhalation, 2 Times Daily - RT    tiotropium bromide monohydrate (Spiriva Respimat) 2.5 MCG/ACT aerosol solution inhaler 2 puffs, Inhalation, Daily    vitamin D (ERGOCALCIFEROL) 12901 UNITS capsule capsule No dose, route, or frequency recorded.      Objective     Vital Signs:   /78   Pulse 60   Ht 188 cm (74\")   Wt 123 kg (272 lb)   SpO2 97% Comment: RA  BMI 34.92 kg/m²   Physical Exam  Constitutional:       Appearance: Normal appearance. He is not ill-appearing or diaphoretic.   Eyes:      Extraocular Movements: Extraocular movements intact.   Pulmonary:      Effort: Pulmonary effort is normal. Prolonged expiration present. No accessory muscle usage or respiratory distress.      Breath sounds: No wheezing, rhonchi or rales.   Skin:     Findings: No erythema or rash.   Neurological:      Mental Status: He is alert.      Result Review  Data Reviewed:         PFT Values          1/3/2023    15:15 1/3/2024    11:30   Pre Drug PFT Results   FVC 98 84   FEV1 84 69   FEF 25-75% 62 48   FEV1/FVC 64 61   Other Tests PFT Results   DLCO 112    D/VAsb 116                 Assessment and Plan    Diagnoses and all orders for this visit:    1. Stage 2 moderate COPD by GOLD classification (Primary)  -     Spirometry    2. Gastroesophageal reflux disease, unspecified whether esophagitis present    3. History of 2019 novel coronavirus disease (COVID-19)    4. Need for prophylactic vaccination and inoculation against respiratory syncytial virus (RSV)  -     Discontinue: RSVPreF3 Vac Recomb Adjuvanted (AREXVY) 120 MCG/0.5ML reconstituted suspension injection; Inject 0.5 mL into the appropriate muscle as directed by prescriber 1 (One) Time for " 1 dose.  Dispense: 1 each; Refill: 0    We discussed spirometry which is lower than previous study  He is s/p Covid, with no Covid related sx currently  Recommend increase use of inhalers  Recommend rsv vaccine.  Rx provided.  Continue tx for GERD  Call for deterioration of symptoms    Follow Up   Return in about 1 year (around 1/3/2025) for spirometry.  Patient was given instructions and counseling regarding his condition or for health maintenance advice. Please see specific information pulled into the AVS if appropriate.    Electronically signed by Laron Sparrow MD, 1/3/2024, 12:03 CST

## 2024-01-09 ENCOUNTER — TELEPHONE (OUTPATIENT)
Dept: GASTROENTEROLOGY | Facility: CLINIC | Age: 74
End: 2024-01-09

## 2024-01-09 NOTE — TELEPHONE ENCOUNTER
Caller: Wayne Linares    Relationship to patient: Self    Best call back number: [phone]     Patient is needing: TO SCHEDULE EGD. A DAY IN FEB. WOULD WORK BEST. PATIENT IS PAST DUE ACCORDING TO RECALL DATE.

## 2024-01-19 ENCOUNTER — TELEMEDICINE (OUTPATIENT)
Dept: GASTROENTEROLOGY | Facility: CLINIC | Age: 74
End: 2024-01-19
Payer: MEDICARE

## 2024-01-19 DIAGNOSIS — K22.70 BARRETT'S ESOPHAGUS DETERMINED BY BIOPSY: ICD-10-CM

## 2024-01-19 DIAGNOSIS — Z79.85 LONG-TERM (CURRENT) USE OF INJECTABLE NON-INSULIN ANTIDIABETIC DRUGS: ICD-10-CM

## 2024-01-19 DIAGNOSIS — K21.9 GASTROESOPHAGEAL REFLUX DISEASE, UNSPECIFIED WHETHER ESOPHAGITIS PRESENT: Primary | ICD-10-CM

## 2024-01-19 PROBLEM — R19.7 DIARRHEA: Status: RESOLVED | Noted: 2021-07-28 | Resolved: 2024-01-19

## 2024-01-19 PROCEDURE — 1159F MED LIST DOCD IN RCRD: CPT | Performed by: NURSE PRACTITIONER

## 2024-01-19 PROCEDURE — 1160F RVW MEDS BY RX/DR IN RCRD: CPT | Performed by: NURSE PRACTITIONER

## 2024-01-19 PROCEDURE — 99213 OFFICE O/P EST LOW 20 MIN: CPT | Performed by: NURSE PRACTITIONER

## 2024-01-19 NOTE — H&P (VIEW-ONLY)
Chief Complaint   Patient presents with    Endoscopy     8-11-21 endo recall 6-2023 no problems       PCP: Saurabh Orellana MD  REFER: No ref. provider found    Subjective     HPI    VIDEO VISIT:    Wayne Linares diagnosed with Parrish's Esophagus by biopsy over 24 years ago.  GERD related symptoms controlled with daily Prevacid.  No dysphagia.  Currently denies heartburn, indigestion, no abdominal pain.  No signs of GI blood loss.      COLONOSCOPY (08/03/2022 10:03)   COLONOSCOPY (08/01/2017 09:01)   Tissue Pathology Exam (08/01/2017 09:08) -tubular adenoma     UPPER GI ENDOSCOPY (08/11/2021 08:52)   UPPER GI ENDOSCOPY (08/07/2020 10:53)   UPPER GI ENDOSCOPY (07/26/2017 10:18)   Tissue Pathology Exam (07/26/2017 10:26)       Past Medical History:   Diagnosis Date    Arthritis     Bundle branch block, right     COPD (chronic obstructive pulmonary disease)     Coronary artery disease     Diabetes mellitus     Hyperlipidemia     Hypertension     Irregular heart beat     Sleep apnea     uses bi-pap       Past Surgical History:   Procedure Laterality Date    CHOLECYSTECTOMY      COLONOSCOPY  05/07/2014    One 95cm polyp removed.  repeat 3 years  Dr Brar    COLONOSCOPY N/A 8/1/2017    Procedure: COLONOSCOPY WITH ANESTHESIA;  Surgeon: Shivam Brar DO;  Location: [facility] ENDOSCOPY;  Service:     COLONOSCOPY N/A 8/3/2022    Procedure: COLONOSCOPY WITH ANESTHESIA;  Surgeon: Shivam Brar DO;  Location: [facility] ENDOSCOPY;  Service: Gastroenterology;  Laterality: N/A;  pre polyyp hx  post diverticulosis  Dr. Orelalna    ENDOSCOPY  05/07/2014    Parrish's  repeat 3 years Dr Brar    ENDOSCOPY N/A 7/26/2017    Procedure: ESOPHAGOGASTRODUODENOSCOPY WITH ANESTHESIA;  Surgeon: Shivam Brar DO;  Location: [facility] ENDOSCOPY;  Service:     ENDOSCOPY N/A 8/7/2020    Procedure: ESOPHAGOGASTRODUODENOSCOPY WITH ANESTHESIA;  Surgeon: Shivam Brar DO;  Location: [facility] ENDOSCOPY;  Service: Gastroenterology;   Patient has Na level of 149 today  The group facility she is from states that patient tends to become hypernatremic  Hypovolemic due to NPO  Will start 1/2 NS infusion   Monitor Na Am Laterality: N/A;  Pre: Reflux  Post: Guido Orellana  CO2 Inflation Used    ENDOSCOPY N/A 8/11/2021    Procedure: ESOPHAGOGASTRODUODENOSCOPY WITH ANESTHESIA;  Surgeon: Shivam Brar DO;  Location: [facility] ENDOSCOPY;  Service: Gastroenterology;  Laterality: N/A;  pre: diarrhea  post: normal  Saurabh Orellana MD    GASTRIC BYPASS      KNEE SURGERY Right     NERVE SURGERY      left arm    TONSILLECTOMY         Outpatient Medications Marked as Taking for the 1/19/24 encounter (Telemedicine) with Jameel Golden APRN   Medication Sig Dispense Refill    aspirin 81 MG tablet Take  by mouth.      diazePAM (VALIUM) 2 MG tablet 2 tablets.      famotidine (PEPCID) 10 MG tablet Take 1 tablet by mouth 2 (Two) Times a Day.      Farxiga 5 MG tablet tablet       ferrous sulfate 325 (65 FE) MG tablet Take  by mouth.      FLUoxetine (PROzac) 20 MG capsule TAKE 1 CAPSULE BY MOUTH DAILY IN THE MORNING  2    hydrochlorothiazide (HYDRODIURIL) 12.5 MG tablet       ipratropium (ATROVENT) 0.03 % nasal spray SPRAY 2 SPRAYS BY EACH NOSTRIL ROUTE IN THE MORNING AND 2 SPRAYS IN THE EVENING.      isosorbide mononitrate (IMDUR) 30 MG 24 hr tablet       lansoprazole (PREVACID) 30 MG capsule       loratadine (Claritin) 10 MG tablet Take 1 tablet by mouth Daily.      montelukast (SINGULAIR) 10 MG tablet Take 1 tablet by mouth Every Night.      pravastatin (PRAVACHOL) 10 MG tablet Take 1 tablet by mouth Daily.      propranolol LA (INDERAL LA) 120 MG 24 hr capsule       SYMBICORT 160-4.5 MCG/ACT inhaler Inhale 2 puffs 2 (Two) Times a Day. 3 inhaler 4    Tirzepatide (Mounjaro) 2.5 MG/0.5ML solution pen-injector pen Inject 0.5 mL under the skin into the appropriate area as directed 1 (One) Time Per Week.      vitamin D (ERGOCALCIFEROL) 53606 UNITS capsule capsule          Allergies   Allergen Reactions    Iodides Anaphylaxis    Shellfish-Derived Products Anaphylaxis     Sea foods    Penicillins Rash    Sulfa Antibiotics Rash        Social History     Socioeconomic History    Marital status:    Tobacco Use    Smoking status: Never     Passive exposure: Past    Smokeless tobacco: Never   Vaping Use    Vaping Use: Never used   Substance and Sexual Activity    Alcohol use: Not Currently    Drug use: No    Sexual activity: Defer       Review of Systems   Constitutional:  Negative for fever and unexpected weight change.   HENT:  Negative for trouble swallowing.    Respiratory:  Negative for shortness of breath.    Cardiovascular:  Negative for chest pain.   Gastrointestinal:  Negative for abdominal pain and anal bleeding.       Objective     There were no vitals filed for this visit.  There is no height or weight on file to calculate BMI.    Virtual Visit Physical Exam    Physical Exam   Constitutional: appears well-nourished.   HENT:   Head: Atraumatic.   Nose: Nose normal.   Eyes: EOM are normal. Right eye exhibits no discharge. Left eye exhibits no discharge.   Neck: Neck normal appearance.  Pulmonary/Chest: Effort normal.   Abdominal: Abdomen appears normal.   Musculoskeletal: Normal range of motion.   Neurological:alert.   Skin: Skin is dry.   Psychiatric:  normal mood and affect.     Imaging Results (Most Recent)       None            There is no height or weight on file to calculate BMI.    Assessment & Plan     Diagnoses and all orders for this visit:    1. Gastroesophageal reflux disease, unspecified whether esophagitis present (Primary)  -     Case Request; Standing  -     Implement Anesthesia Orders Day of Procedure; Standing  -     Obtain Informed Consent; Standing  -     Case Request    2. Parrish's esophagus determined by biopsy  -     Case Request; Standing  -     Implement Anesthesia Orders Day of Procedure; Standing  -     Obtain Informed Consent; Standing  -     Case Request    3. Long-term (current) use of injectable non-insulin antidiabetic drugs        ESOPHAGOGASTRODUODENOSCOPY WITH ANESTHESIA (N/A)    Patient is  to continue all blood pressure and cardiac medications prior to procedure and has been advised to take medications morning of procedure   Pt verbalized understanding     Wayne Linares is tolerating Prevacid/Lansoprazole therapy without difficulty.  Symptoms are controlled with current PPI medication and dose. Saurabh Orellana MD prescribes prevacid, and does not need refills.    Wayne Linares asked to hold Tirzepatide (Mournjaro) x 7 days prior to procedure per The American Society of Anesthesia guidelines     Take PPI 30 min prior to breakfast   Decrease caffeine, nicotine, etoh- all contribute to acid reflux  Do not eat 2-3 hours within lying down, elevated HOB 4-6 inches     Advised pt to stop use of NSAIDs, Fish Oil, and MV 5 days prior to procedure, per Dr Brar protocol.  Tylenol based products are ok to take.  Pt verbalized understanding.     Colonoscopy is up to date, procedure can be performed earlier if clinically indicated    The risks of the endoscopy were discussed in detail.  We discussed the risks of perforation (one out of 6481-6813, riskier with dilation), bleeding (one out of 500), and the rare risks of infection, adverse reaction to anesthesia, respiratory failure, cardiac failure including MI and adverse reaction to medications, etc.  We discussed consequences that could occur if a risk were to develop such as the need for hospitalization, blood transfusion, surgical intervention, medications, pain and disability and death.  Alternatives include not doing anything, or pursuing an UGI series which only offers a diagnosis with potential less accuracy compared to EGD.  The patient verbalizes understanding and agrees to proceed.      This was an audio and video enabled telemedicine encounter. This visit was conducted with me in my office and Wayne Linares at their home  This visit included audio and video interaction.  No technical issues occurred during this visit.     Jameel Carmichael  Chantel, BERNARDINO  01/19/24          There are no Patient Instructions on file for this visit.

## 2024-01-19 NOTE — PROGRESS NOTES
Chief Complaint   Patient presents with    Endoscopy     8-11-21 endo recall 6-2023 no problems       PCP: Saurabh Orellana MD  REFER: No ref. provider found    Subjective     HPI    VIDEO VISIT:    Wayne Linares diagnosed with Parrish's Esophagus by biopsy over 24 years ago.  GERD related symptoms controlled with daily Prevacid.  No dysphagia.  Currently denies heartburn, indigestion, no abdominal pain.  No signs of GI blood loss.      COLONOSCOPY (08/03/2022 10:03)   COLONOSCOPY (08/01/2017 09:01)   Tissue Pathology Exam (08/01/2017 09:08) -tubular adenoma     UPPER GI ENDOSCOPY (08/11/2021 08:52)   UPPER GI ENDOSCOPY (08/07/2020 10:53)   UPPER GI ENDOSCOPY (07/26/2017 10:18)   Tissue Pathology Exam (07/26/2017 10:26)       Past Medical History:   Diagnosis Date    Arthritis     Bundle branch block, right     COPD (chronic obstructive pulmonary disease)     Coronary artery disease     Diabetes mellitus     Hyperlipidemia     Hypertension     Irregular heart beat     Sleep apnea     uses bi-pap       Past Surgical History:   Procedure Laterality Date    CHOLECYSTECTOMY      COLONOSCOPY  05/07/2014    One 95cm polyp removed.  repeat 3 years  Dr Brar    COLONOSCOPY N/A 8/1/2017    Procedure: COLONOSCOPY WITH ANESTHESIA;  Surgeon: Shivam Brar DO;  Location: [facility] ENDOSCOPY;  Service:     COLONOSCOPY N/A 8/3/2022    Procedure: COLONOSCOPY WITH ANESTHESIA;  Surgeon: Shivam Brar DO;  Location: [facility] ENDOSCOPY;  Service: Gastroenterology;  Laterality: N/A;  pre polyyp hx  post diverticulosis  Dr. Orellana    ENDOSCOPY  05/07/2014    Parrish's  repeat 3 years Dr Brar    ENDOSCOPY N/A 7/26/2017    Procedure: ESOPHAGOGASTRODUODENOSCOPY WITH ANESTHESIA;  Surgeon: Shivam Brar DO;  Location: [facility] ENDOSCOPY;  Service:     ENDOSCOPY N/A 8/7/2020    Procedure: ESOPHAGOGASTRODUODENOSCOPY WITH ANESTHESIA;  Surgeon: Shivam Brar DO;  Location: [facility] ENDOSCOPY;  Service: Gastroenterology;   Laterality: N/A;  Pre: Reflux  Post: Guido Orellana  CO2 Inflation Used    ENDOSCOPY N/A 8/11/2021    Procedure: ESOPHAGOGASTRODUODENOSCOPY WITH ANESTHESIA;  Surgeon: Shivam Brar DO;  Location: [facility] ENDOSCOPY;  Service: Gastroenterology;  Laterality: N/A;  pre: diarrhea  post: normal  Saurabh Orellana MD    GASTRIC BYPASS      KNEE SURGERY Right     NERVE SURGERY      left arm    TONSILLECTOMY         Outpatient Medications Marked as Taking for the 1/19/24 encounter (Telemedicine) with Jameel Golden APRN   Medication Sig Dispense Refill    aspirin 81 MG tablet Take  by mouth.      diazePAM (VALIUM) 2 MG tablet 2 tablets.      famotidine (PEPCID) 10 MG tablet Take 1 tablet by mouth 2 (Two) Times a Day.      Farxiga 5 MG tablet tablet       ferrous sulfate 325 (65 FE) MG tablet Take  by mouth.      FLUoxetine (PROzac) 20 MG capsule TAKE 1 CAPSULE BY MOUTH DAILY IN THE MORNING  2    hydrochlorothiazide (HYDRODIURIL) 12.5 MG tablet       ipratropium (ATROVENT) 0.03 % nasal spray SPRAY 2 SPRAYS BY EACH NOSTRIL ROUTE IN THE MORNING AND 2 SPRAYS IN THE EVENING.      isosorbide mononitrate (IMDUR) 30 MG 24 hr tablet       lansoprazole (PREVACID) 30 MG capsule       loratadine (Claritin) 10 MG tablet Take 1 tablet by mouth Daily.      montelukast (SINGULAIR) 10 MG tablet Take 1 tablet by mouth Every Night.      pravastatin (PRAVACHOL) 10 MG tablet Take 1 tablet by mouth Daily.      propranolol LA (INDERAL LA) 120 MG 24 hr capsule       SYMBICORT 160-4.5 MCG/ACT inhaler Inhale 2 puffs 2 (Two) Times a Day. 3 inhaler 4    Tirzepatide (Mounjaro) 2.5 MG/0.5ML solution pen-injector pen Inject 0.5 mL under the skin into the appropriate area as directed 1 (One) Time Per Week.      vitamin D (ERGOCALCIFEROL) 35908 UNITS capsule capsule          Allergies   Allergen Reactions    Iodides Anaphylaxis    Shellfish-Derived Products Anaphylaxis     Sea foods    Penicillins Rash    Sulfa Antibiotics Rash        Social History     Socioeconomic History    Marital status:    Tobacco Use    Smoking status: Never     Passive exposure: Past    Smokeless tobacco: Never   Vaping Use    Vaping Use: Never used   Substance and Sexual Activity    Alcohol use: Not Currently    Drug use: No    Sexual activity: Defer       Review of Systems   Constitutional:  Negative for fever and unexpected weight change.   HENT:  Negative for trouble swallowing.    Respiratory:  Negative for shortness of breath.    Cardiovascular:  Negative for chest pain.   Gastrointestinal:  Negative for abdominal pain and anal bleeding.       Objective     There were no vitals filed for this visit.  There is no height or weight on file to calculate BMI.    Virtual Visit Physical Exam    Physical Exam   Constitutional: appears well-nourished.   HENT:   Head: Atraumatic.   Nose: Nose normal.   Eyes: EOM are normal. Right eye exhibits no discharge. Left eye exhibits no discharge.   Neck: Neck normal appearance.  Pulmonary/Chest: Effort normal.   Abdominal: Abdomen appears normal.   Musculoskeletal: Normal range of motion.   Neurological:alert.   Skin: Skin is dry.   Psychiatric:  normal mood and affect.     Imaging Results (Most Recent)       None            There is no height or weight on file to calculate BMI.    Assessment & Plan     Diagnoses and all orders for this visit:    1. Gastroesophageal reflux disease, unspecified whether esophagitis present (Primary)  -     Case Request; Standing  -     Implement Anesthesia Orders Day of Procedure; Standing  -     Obtain Informed Consent; Standing  -     Case Request    2. Parrish's esophagus determined by biopsy  -     Case Request; Standing  -     Implement Anesthesia Orders Day of Procedure; Standing  -     Obtain Informed Consent; Standing  -     Case Request    3. Long-term (current) use of injectable non-insulin antidiabetic drugs        ESOPHAGOGASTRODUODENOSCOPY WITH ANESTHESIA (N/A)    Patient is  to continue all blood pressure and cardiac medications prior to procedure and has been advised to take medications morning of procedure   Pt verbalized understanding     Wayne Linares is tolerating Prevacid/Lansoprazole therapy without difficulty.  Symptoms are controlled with current PPI medication and dose. Saurabh Orellana MD prescribes prevacid, and does not need refills.    Wayne Linares asked to hold Tirzepatide (Mournjaro) x 7 days prior to procedure per The American Society of Anesthesia guidelines     Take PPI 30 min prior to breakfast   Decrease caffeine, nicotine, etoh- all contribute to acid reflux  Do not eat 2-3 hours within lying down, elevated HOB 4-6 inches     Advised pt to stop use of NSAIDs, Fish Oil, and MV 5 days prior to procedure, per Dr Brar protocol.  Tylenol based products are ok to take.  Pt verbalized understanding.     Colonoscopy is up to date, procedure can be performed earlier if clinically indicated    The risks of the endoscopy were discussed in detail.  We discussed the risks of perforation (one out of 6557-5458, riskier with dilation), bleeding (one out of 500), and the rare risks of infection, adverse reaction to anesthesia, respiratory failure, cardiac failure including MI and adverse reaction to medications, etc.  We discussed consequences that could occur if a risk were to develop such as the need for hospitalization, blood transfusion, surgical intervention, medications, pain and disability and death.  Alternatives include not doing anything, or pursuing an UGI series which only offers a diagnosis with potential less accuracy compared to EGD.  The patient verbalizes understanding and agrees to proceed.      This was an audio and video enabled telemedicine encounter. This visit was conducted with me in my office and Wayne Linares at their home  This visit included audio and video interaction.  No technical issues occurred during this visit.     Jameel Carmichael  Chantel, BERNARDINO  01/19/24          There are no Patient Instructions on file for this visit.

## 2024-02-15 ENCOUNTER — TELEPHONE (OUTPATIENT)
Dept: GASTROENTEROLOGY | Facility: CLINIC | Age: 74
End: 2024-02-15
Payer: MEDICARE

## 2024-02-15 ENCOUNTER — HOSPITAL ENCOUNTER (OUTPATIENT)
Facility: HOSPITAL | Age: 74
Setting detail: HOSPITAL OUTPATIENT SURGERY
Discharge: HOME | End: 2024-02-15
Attending: INTERNAL MEDICINE | Admitting: INTERNAL MEDICINE
Payer: MEDICARE

## 2024-02-15 ENCOUNTER — ANESTHESIA (OUTPATIENT)
Dept: GASTROENTEROLOGY | Facility: HOSPITAL | Age: 74
End: 2024-02-15
Payer: MEDICARE

## 2024-02-15 ENCOUNTER — ANESTHESIA EVENT (OUTPATIENT)
Dept: GASTROENTEROLOGY | Facility: HOSPITAL | Age: 74
End: 2024-02-15
Payer: MEDICARE

## 2024-02-15 VITALS
OXYGEN SATURATION: 94 % | BODY MASS INDEX: 34.27 KG/M2 | DIASTOLIC BLOOD PRESSURE: 62 MMHG | HEART RATE: 56 BPM | SYSTOLIC BLOOD PRESSURE: 110 MMHG | RESPIRATION RATE: 16 BRPM | TEMPERATURE: 96.8 F | WEIGHT: 267 LBS | HEIGHT: 74 IN

## 2024-02-15 DIAGNOSIS — K21.9 GASTROESOPHAGEAL REFLUX DISEASE, UNSPECIFIED WHETHER ESOPHAGITIS PRESENT: ICD-10-CM

## 2024-02-15 DIAGNOSIS — K22.70 BARRETT'S ESOPHAGUS DETERMINED BY BIOPSY: ICD-10-CM

## 2024-02-15 LAB — GLUCOSE BLDC GLUCOMTR-MCNC: 131 MG/DL (ref 70–130)

## 2024-02-15 PROCEDURE — 88305 TISSUE EXAM BY PATHOLOGIST: CPT | Performed by: INTERNAL MEDICINE

## 2024-02-15 PROCEDURE — 82948 REAGENT STRIP/BLOOD GLUCOSE: CPT

## 2024-02-15 PROCEDURE — 25010000002 PROPOFOL 10 MG/ML EMULSION: Performed by: NURSE ANESTHETIST, CERTIFIED REGISTERED

## 2024-02-15 PROCEDURE — 43239 EGD BIOPSY SINGLE/MULTIPLE: CPT | Performed by: INTERNAL MEDICINE

## 2024-02-15 PROCEDURE — 25810000003 SODIUM CHLORIDE 0.9 % SOLUTION: Performed by: ANESTHESIOLOGY

## 2024-02-15 RX ORDER — PRIMIDONE 250 MG/1
150 TABLET ORAL NIGHTLY
COMMUNITY

## 2024-02-15 RX ORDER — SODIUM CHLORIDE 0.9 % (FLUSH) 0.9 %
10 SYRINGE (ML) INJECTION AS NEEDED
Status: DISCONTINUED | OUTPATIENT
Start: 2024-02-15 | End: 2024-02-15 | Stop reason: HOSPADM

## 2024-02-15 RX ORDER — SODIUM CHLORIDE 9 MG/ML
500 INJECTION, SOLUTION INTRAVENOUS CONTINUOUS PRN
Status: DISCONTINUED | OUTPATIENT
Start: 2024-02-15 | End: 2024-02-15 | Stop reason: HOSPADM

## 2024-02-15 RX ORDER — PROPOFOL 10 MG/ML
VIAL (ML) INTRAVENOUS AS NEEDED
Status: DISCONTINUED | OUTPATIENT
Start: 2024-02-15 | End: 2024-02-15 | Stop reason: SURG

## 2024-02-15 RX ADMIN — SODIUM CHLORIDE 500 ML: 9 INJECTION, SOLUTION INTRAVENOUS at 11:01

## 2024-02-15 RX ADMIN — PROPOFOL INJECTABLE EMULSION 150 MG: 10 INJECTION, EMULSION INTRAVENOUS at 11:11

## 2024-02-16 LAB
CYTO UR: NORMAL
LAB AP CASE REPORT: NORMAL
Lab: NORMAL
PATH REPORT.FINAL DX SPEC: NORMAL
PATH REPORT.GROSS SPEC: NORMAL

## 2024-03-25 ENCOUNTER — TELEPHONE (OUTPATIENT)
Dept: NEUROLOGY | Age: 74
End: 2024-03-25

## 2024-03-25 NOTE — TELEPHONE ENCOUNTER
Spoke to patient and he is taking primidone 50mg 3 QHS, and would like to only take one pill daily. I did let him know that primidone comes in 50mg, 125mg, and 250mg. He ask if he could take the 250mg dose, I explained that he might have to take 4 50mg daily for a few weeks and then go up to 250mg daily. I instructed that I would check with Dr Gallagher and get back with him.

## 2024-03-25 NOTE — TELEPHONE ENCOUNTER
The pt has a question about the dosage of the Primidone.  The pt wants to know if the dosage can be changed to 1 pill once a day instead of 1 pill 3 times a day.  1 pill a day to equal the 3 pills a day .

## 2024-03-26 NOTE — TELEPHONE ENCOUNTER
Spoke to patient and instructed ok to increase primidone to 250mg QD. Instructed to take 4 of the 50mg Primidone until out. Dr Gallagher will send in a new script for Primidone 250mg 1 PO QD.

## 2024-03-27 RX ORDER — PRIMIDONE 250 MG/1
250 TABLET ORAL NIGHTLY
Qty: 30 TABLET | Refills: 5 | Status: SHIPPED | OUTPATIENT
Start: 2024-03-27 | End: 2024-09-23

## 2024-04-10 ENCOUNTER — APPOINTMENT (OUTPATIENT)
Dept: OTHER | Facility: HOSPITAL | Age: 74
End: 2024-04-10
Payer: MEDICARE

## 2024-04-10 ENCOUNTER — HOSPITAL ENCOUNTER (OUTPATIENT)
Dept: GENERAL RADIOLOGY | Facility: HOSPITAL | Age: 74
Discharge: HOME | End: 2024-04-10
Payer: MEDICARE

## 2024-04-10 ENCOUNTER — OFFICE VISIT (OUTPATIENT)
Dept: PULMONOLOGY | Facility: CLINIC | Age: 74
End: 2024-04-10
Payer: MEDICARE

## 2024-04-10 VITALS
BODY MASS INDEX: 35.29 KG/M2 | WEIGHT: 275 LBS | HEIGHT: 74 IN | SYSTOLIC BLOOD PRESSURE: 122 MMHG | OXYGEN SATURATION: 96 % | HEART RATE: 64 BPM | DIASTOLIC BLOOD PRESSURE: 74 MMHG

## 2024-04-10 DIAGNOSIS — R05.3 CHRONIC COUGH: ICD-10-CM

## 2024-04-10 DIAGNOSIS — J44.9 STAGE 2 MODERATE COPD BY GOLD CLASSIFICATION: Primary | ICD-10-CM

## 2024-04-10 DIAGNOSIS — J44.9 STAGE 2 MODERATE COPD BY GOLD CLASSIFICATION: ICD-10-CM

## 2024-04-10 PROCEDURE — 99214 OFFICE O/P EST MOD 30 MIN: CPT | Performed by: INTERNAL MEDICINE

## 2024-04-10 PROCEDURE — 71046 X-RAY EXAM CHEST 2 VIEWS: CPT

## 2024-04-10 RX ORDER — PREDNISONE 20 MG/1
40 TABLET ORAL DAILY
Qty: 10 TABLET | Refills: 0 | Status: SHIPPED | OUTPATIENT
Start: 2024-04-10 | End: 2024-04-15

## 2024-04-10 RX ORDER — AZELASTINE HYDROCHLORIDE 137 UG/1
1 SPRAY, METERED NASAL 2 TIMES DAILY
Qty: 30 ML | Refills: 11 | Status: SHIPPED | OUTPATIENT
Start: 2024-04-10 | End: 2024-05-10

## 2024-04-10 NOTE — PROGRESS NOTES
Background:  Patient w gold 2 copd   Chief Complaint  COPD    Subjective    History of Present Illness     Wayne Linares is here for follow up with [facility].  History of Present Illness  He follows up for cough.  He says he has been doing poorly.  About a month ago he went on a hike [place] and got into a lot of cold moist air.  He got sick after that and has seen Dr. George 3 times and has received steroid shots antibiotic PACs and more steroid packs.  A chest x-ray was reported okay.  He takes propranolol and we discussed this can aggravate bronchospasm.  He is using Symbicort Singulair and albuterol Nebules visors.  He is having some nasal congestion as well nasal drainage.     Tobacco Use: Low Risk  (4/10/2024)    Patient History     Smoking Tobacco Use: Never     Smokeless Tobacco Use: Never     Passive Exposure: Past      Current Outpatient Medications   Medication Instructions    aclidinium bromide (TUDORZA PRESSAIR) 400 MCG/ACT aerosol powder  powder for inhalation 1 puff, Inhalation, 2 Times Daily - RT    aspirin 81 MG tablet Oral    Azelastine HCl 137 mcg, Nasal, 2 Times Daily    diazePAM (VALIUM) 4 mg    EPINEPHrine (EPIPEN) 0.3 MG/0.3ML solution auto-injector injection Intramuscular    famotidine (PEPCID) 10 mg, Oral, 2 Times Daily    Farxiga 5 MG tablet tablet No dose, route, or frequency recorded.    ferrous sulfate 325 (65 FE) MG tablet Oral    FLUoxetine (PROzac) 20 MG capsule TAKE 1 CAPSULE BY MOUTH DAILY IN THE MORNING    hydrochlorothiazide (HYDRODIURIL) 12.5 MG tablet No dose, route, or frequency recorded.    ipratropium (ATROVENT) 0.03 % nasal spray SPRAY 2 SPRAYS BY EACH NOSTRIL ROUTE IN THE MORNING AND 2 SPRAYS IN THE EVENING.    isosorbide mononitrate (IMDUR) 30 MG 24 hr tablet No dose, route, or frequency recorded.    lansoprazole (PREVACID) 30 MG capsule No dose, route, or frequency recorded.    loratadine (CLARITIN)  "10 mg, Oral, Daily    montelukast (SINGULAIR) 10 mg, Oral, Nightly    pravastatin (PRAVACHOL) 10 mg, Oral, Daily    predniSONE (DELTASONE) 40 mg, Oral, Daily    primidone (MYSOLINE) 150 mg, Oral, Nightly    promethazine-codeine (PHENERGAN with CODEINE) 6.25-10 MG/5ML syrup     propranolol LA (INDERAL LA) 120 MG 24 hr capsule No dose, route, or frequency recorded.    SYMBICORT 160-4.5 MCG/ACT inhaler 2 puffs, Inhalation, 2 Times Daily - RT    tiotropium bromide monohydrate (Spiriva Respimat) 2.5 MCG/ACT aerosol solution inhaler 2 puffs, Inhalation, Daily    Tirzepatide (MOUNJARO) 2.5 mg, Subcutaneous, Weekly    vitamin D (ERGOCALCIFEROL) 65076 UNITS capsule capsule No dose, route, or frequency recorded.      Objective     Vital Signs:   /74   Pulse 64   Ht 188 cm (74\")   Wt 125 kg (275 lb)   SpO2 96% Comment: RA  BMI 35.31 kg/m²   Physical Exam  HENT:      Head:      Comments: Right tympanic membrane dull     Right Ear: Ear canal normal.      Left Ear: Tympanic membrane normal.   Pulmonary:      Breath sounds: No wheezing, rhonchi or rales.        Result Review  Data Reviewed:    XR Chest 2 View    Result Date: 4/10/2024  Impression: 1. No acute disease.        This report was signed and finalized on 4/10/2024 12:17 PM by Dr. Isaac Aguirre MD.       PFT Values          1/3/2023    15:15 1/3/2024    11:30   Pre Drug PFT Results   FVC 98 84   FEV1 84 69   FEF 25-75% 62 48   FEV1/FVC 64 61   Other Tests PFT Results   DLCO 112    D/VAsb 116                 Assessment and Plan    Diagnoses and all orders for this visit:    1. Stage 2 moderate COPD by GOLD classification (Primary)  -     Azelastine HCl 137 MCG/SPRAY solution; 1 spray into the nostril(s) as directed by provider 2 (Two) Times a Day for 30 days.  Dispense: 30 mL; Refill: 11  -     predniSONE (DELTASONE) 20 MG tablet; Take 2 tablets by mouth Daily for 5 days.  Dispense: 10 tablet; Refill: 0  -     XR Chest 2 View; Future    2. Chronic cough  -     " Azelastine HCl 137 MCG/SPRAY solution; 1 spray into the nostril(s) as directed by provider 2 (Two) Times a Day for 30 days.  Dispense: 30 mL; Refill: 11  -     predniSONE (DELTASONE) 20 MG tablet; Take 2 tablets by mouth Daily for 5 days.  Dispense: 10 tablet; Refill: 0  -     XR Chest 2 View; Future    He is probably having some degree of COPD exacerbation.  We do note his PFTs are lower as of January than they were a year ago and now he is having more chronic symptoms.  Will give another course of steroids.  Add azelastine nasal spray as well.  I do not think he needs more antibiotics at this point in absence of fevers.  We have repeated his chest x-ray at this visit and that has come back showing no acute infiltrates so he does not appear to have pneumonia.  Reassess and repeat spirometry in a few weeks.    Follow Up   Return in about 4 weeks (around 5/8/2024) for spirometry.  Patient was given instructions and counseling regarding his condition or for health maintenance advice. Please see specific information pulled into the AVS if appropriate.    Electronically signed by Laron Sparrow MD, 4/10/2024, 17:51 CDT

## 2024-04-10 NOTE — PROGRESS NOTES
Let pt know this looked ok.  No fluid or pneumonia or other things to treat which is good.  Nothing else to do for now.  Keep follow up as planned

## 2024-05-03 DIAGNOSIS — J44.9 STAGE 2 MODERATE COPD BY GOLD CLASSIFICATION: Primary | ICD-10-CM

## 2024-05-06 RX ORDER — BUDESONIDE AND FORMOTEROL FUMARATE DIHYDRATE 160; 4.5 UG/1; UG/1
2 AEROSOL RESPIRATORY (INHALATION)
Qty: 30.6 G | Refills: 3 | Status: SHIPPED | OUTPATIENT
Start: 2024-05-06

## 2024-05-06 RX ORDER — TIOTROPIUM BROMIDE INHALATION SPRAY 3.12 UG/1
SPRAY, METERED RESPIRATORY (INHALATION)
Qty: 12 G | Refills: 3 | Status: SHIPPED | OUTPATIENT
Start: 2024-05-06

## 2024-05-10 ENCOUNTER — TELEPHONE (OUTPATIENT)
Dept: PULMONOLOGY | Facility: CLINIC | Age: 74
End: 2024-05-10
Payer: MEDICARE

## 2024-06-21 ENCOUNTER — OFFICE VISIT (OUTPATIENT)
Dept: CARDIOLOGY CLINIC | Age: 74
End: 2024-06-21
Payer: MEDICARE

## 2024-06-21 VITALS
WEIGHT: 278 LBS | SYSTOLIC BLOOD PRESSURE: 130 MMHG | HEIGHT: 74 IN | BODY MASS INDEX: 35.68 KG/M2 | DIASTOLIC BLOOD PRESSURE: 68 MMHG | HEART RATE: 68 BPM | OXYGEN SATURATION: 96 %

## 2024-06-21 DIAGNOSIS — I10 PRIMARY HYPERTENSION: ICD-10-CM

## 2024-06-21 DIAGNOSIS — I25.10 CORONARY ARTERY DISEASE INVOLVING NATIVE CORONARY ARTERY OF NATIVE HEART WITHOUT ANGINA PECTORIS: Primary | ICD-10-CM

## 2024-06-21 PROCEDURE — 3017F COLORECTAL CA SCREEN DOC REV: CPT | Performed by: INTERNAL MEDICINE

## 2024-06-21 PROCEDURE — 1036F TOBACCO NON-USER: CPT | Performed by: INTERNAL MEDICINE

## 2024-06-21 PROCEDURE — G8427 DOCREV CUR MEDS BY ELIG CLIN: HCPCS | Performed by: INTERNAL MEDICINE

## 2024-06-21 PROCEDURE — 1123F ACP DISCUSS/DSCN MKR DOCD: CPT | Performed by: INTERNAL MEDICINE

## 2024-06-21 PROCEDURE — 3075F SYST BP GE 130 - 139MM HG: CPT | Performed by: INTERNAL MEDICINE

## 2024-06-21 PROCEDURE — 3078F DIAST BP <80 MM HG: CPT | Performed by: INTERNAL MEDICINE

## 2024-06-21 PROCEDURE — 99214 OFFICE O/P EST MOD 30 MIN: CPT | Performed by: INTERNAL MEDICINE

## 2024-06-21 PROCEDURE — G8417 CALC BMI ABV UP PARAM F/U: HCPCS | Performed by: INTERNAL MEDICINE

## 2024-06-21 RX ORDER — DIAZEPAM 2 MG/1
TABLET ORAL
COMMUNITY
Start: 2023-12-19

## 2024-06-21 RX ORDER — SEMAGLUTIDE 1.34 MG/ML
1 INJECTION, SOLUTION SUBCUTANEOUS
COMMUNITY
Start: 2024-05-15

## 2024-06-21 RX ORDER — PRAVASTATIN SODIUM 40 MG
40 TABLET ORAL DAILY
Qty: 90 TABLET | Refills: 3 | Status: SHIPPED | OUTPATIENT
Start: 2024-06-21

## 2024-06-21 RX ORDER — TIOTROPIUM BROMIDE INHALATION SPRAY 3.12 UG/1
SPRAY, METERED RESPIRATORY (INHALATION)
COMMUNITY
Start: 2024-05-06

## 2024-06-21 ASSESSMENT — ENCOUNTER SYMPTOMS
DIARRHEA: 0
ABDOMINAL DISTENTION: 0
SHORTNESS OF BREATH: 0
ABDOMINAL PAIN: 0
VOMITING: 0
WHEEZING: 0
BLOOD IN STOOL: 0
BACK PAIN: 0
COUGH: 0

## 2024-06-21 NOTE — PROGRESS NOTES
[facility]  Cardiology Office Note  [address], [place] [address]  Phone: [phone]  Fax: [phone]                            Date:  6/21/2024  Patient: Gavin Apple  Age:  73 y.o., 1950    Referral: No ref. provider found      PROBLEM LIST:    Patient Active Problem List    Diagnosis Date Noted    Syncope      Priority: High    Nasal obstruction 09/27/2022     Priority: Medium    Maxillary sinus cyst 09/27/2022     Priority: Medium    Chest pain 03/05/2021     Priority: Low    Ventricular tachyarrhythmia (HCC) 01/04/2020     Priority: Low    V-tach (HCC) 01/03/2020     Priority: Low    Right ventricular enlargement 01/28/2019     Priority: Low    Biatrial enlargement 01/28/2019     Priority: Low    Type 2 diabetes mellitus with complication, without long-term current use of insulin (HCC) 01/28/2019     Priority: Low    Sleep apnea, obstructive 09/21/2017     Priority: Low    Coronary atherosclerosis due to calcified coronary lesion of native artery      Priority: Low    Hypertension      Priority: Low    Class 2 obesity due to excess calories without serious comorbidity with body mass index (BMI) of 38.0 to 38.9 in adult      Priority: Low    ELROY on CPAP      Priority: Low     1.  Coronary artery disease, abnormal dobutamine stress echo with NSVT, cardiac catheterization 1/6/2020 with mid LAD 55% stenosis, IFR 0.92, medically managed, negative Lexiscan study 3/6/2021, normal LV ejection fraction.  2.  Non-insulin-dependent diabetes mellitus.  3.  Hypertension.  4.  Severe obesity with ELROY.    PRESENTATION: Gavin Apple is a 73 y.o. year old male presents for follow-up evaluation.  For the most part he has been doing quite well with no significant issues.  Has some balance issues with vertigo that did not resolve with realignment treatment.  Did help with some Valium.  Tremor is doing better on propranolol.  Blood pressure appears well-controlled

## 2024-06-27 RX ORDER — PROPRANOLOL HYDROCHLORIDE 120 MG/1
CAPSULE, EXTENDED RELEASE ORAL
Qty: 90 CAPSULE | Refills: 3 | Status: SHIPPED | OUTPATIENT
Start: 2024-06-27

## 2024-07-01 RX ORDER — ISOSORBIDE MONONITRATE 30 MG/1
30 TABLET, EXTENDED RELEASE ORAL DAILY
Qty: 90 TABLET | Refills: 3 | Status: SHIPPED | OUTPATIENT
Start: 2024-07-01

## 2024-09-10 RX ORDER — PRIMIDONE 250 MG/1
TABLET ORAL
Qty: 30 TABLET | Refills: 5 | Status: SHIPPED | OUTPATIENT
Start: 2024-09-10

## 2024-12-12 ENCOUNTER — OFFICE VISIT (OUTPATIENT)
Dept: CARDIOLOGY CLINIC | Age: 74
End: 2024-12-12
Payer: MEDICARE

## 2024-12-12 VITALS
WEIGHT: 267 LBS | HEART RATE: 61 BPM | HEIGHT: 74 IN | DIASTOLIC BLOOD PRESSURE: 66 MMHG | SYSTOLIC BLOOD PRESSURE: 116 MMHG | BODY MASS INDEX: 34.27 KG/M2 | OXYGEN SATURATION: 94 %

## 2024-12-12 DIAGNOSIS — I25.10 CORONARY ARTERY DISEASE INVOLVING NATIVE CORONARY ARTERY OF NATIVE HEART WITHOUT ANGINA PECTORIS: Primary | ICD-10-CM

## 2024-12-12 DIAGNOSIS — E78.2 MIXED HYPERLIPIDEMIA: ICD-10-CM

## 2024-12-12 DIAGNOSIS — I10 PRIMARY HYPERTENSION: ICD-10-CM

## 2024-12-12 PROCEDURE — 93000 ELECTROCARDIOGRAM COMPLETE: CPT | Performed by: CLINICAL NURSE SPECIALIST

## 2024-12-12 PROCEDURE — G8427 DOCREV CUR MEDS BY ELIG CLIN: HCPCS | Performed by: CLINICAL NURSE SPECIALIST

## 2024-12-12 PROCEDURE — 3017F COLORECTAL CA SCREEN DOC REV: CPT | Performed by: CLINICAL NURSE SPECIALIST

## 2024-12-12 PROCEDURE — 1159F MED LIST DOCD IN RCRD: CPT | Performed by: CLINICAL NURSE SPECIALIST

## 2024-12-12 PROCEDURE — 99213 OFFICE O/P EST LOW 20 MIN: CPT | Performed by: CLINICAL NURSE SPECIALIST

## 2024-12-12 PROCEDURE — G8417 CALC BMI ABV UP PARAM F/U: HCPCS | Performed by: CLINICAL NURSE SPECIALIST

## 2024-12-12 PROCEDURE — G8484 FLU IMMUNIZE NO ADMIN: HCPCS | Performed by: CLINICAL NURSE SPECIALIST

## 2024-12-12 PROCEDURE — 1123F ACP DISCUSS/DSCN MKR DOCD: CPT | Performed by: CLINICAL NURSE SPECIALIST

## 2024-12-12 PROCEDURE — 1036F TOBACCO NON-USER: CPT | Performed by: CLINICAL NURSE SPECIALIST

## 2024-12-12 PROCEDURE — 1160F RVW MEDS BY RX/DR IN RCRD: CPT | Performed by: CLINICAL NURSE SPECIALIST

## 2024-12-12 PROCEDURE — 3078F DIAST BP <80 MM HG: CPT | Performed by: CLINICAL NURSE SPECIALIST

## 2024-12-12 PROCEDURE — 3074F SYST BP LT 130 MM HG: CPT | Performed by: CLINICAL NURSE SPECIALIST

## 2024-12-12 RX ORDER — DIPHENHYDRAMINE HCL 12.5MG/5ML
5 LIQUID (ML) ORAL EVERY OTHER DAY
COMMUNITY

## 2024-12-12 RX ORDER — METFORMIN HYDROCHLORIDE 500 MG/1
500 TABLET, EXTENDED RELEASE ORAL DAILY
COMMUNITY
Start: 2024-12-06

## 2024-12-12 NOTE — PROGRESS NOTES
[facility]  [address] [place] [address]  Phone: [phone]  Fax: [phone]    OFFICE VISIT:  2024    Gavin Apple - : 1950    Reason For Visit:  Gavin is a 74 y.o. male who is here for Follow-up (Patient states that there is no cardiac complaint but would like to speak about medications.) and Coronary Artery Disease  1.  Coronary artery disease, abnormal dobutamine stress echo with NSVT, cardiac catheterization 2020 with mid LAD 55% stenosis, IFR 0.92, medically managed, negative Lexiscan study 3/6/2021, normal LV ejection fraction.  2.  Non-insulin-dependent diabetes mellitus.  3.  Hypertension.  4.  Severe obesity with ELROY.-Wearing CPAP     He returns today for routine follow-up.  No cardiac complaints.  He has been fairly active and no change in symptoms.    He is tolerating the higher dose of pravastatin  Still has tremor.  Has follow-up with neurology soon        Subjective  Gavin denies exertional chest pain, shortness of breath, orthopnea, paroxysmal nocturnal dyspnea, syncope, presyncope, arrhythmia, edema and fatigue.  The patient denies numbness or weakness to suggest cerebrovascular accident or transient ischemic attack.    Jeffery Sparks MD is PCP and follows labs including lipids.  He is on Ozempic now and last A1c down from 8     Gavin Apple has the following history as recorded in [facility]:    Patient Active Problem List    Diagnosis Date Noted    Syncope     Nasal obstruction 2022    Maxillary sinus cyst 2022    Chest pain 2021    Ventricular tachyarrhythmia (HCC) 2020    V-tach (HCC) 2020    Right ventricular enlargement 2019    Biatrial enlargement 2019    Type 2 diabetes mellitus with complication, without long-term current use of insulin (HCC) 2019    Sleep apnea, obstructive 2017    Coronary atherosclerosis due to calcified coronary lesion of native artery     Hypertension

## 2024-12-12 NOTE — PATIENT INSTRUCTIONS
Maintain good blood pressure control-goal<130/80 at rest  Maintain good cholesterol control LDL goal<70 with arterial disease  If you are diabetic work to keep/obtain hemoglobin A1c< 7    Follow up in Aug with Dr Maddox   Call with any questions or concerns  Follow up with Jeffery Sparks MD for non cardiac problems  Report any new problems  Cardiovascular Fitness-Exercise as tolerated.  Strive for 30 minutes of exercise most days of the week.    Cardiac / Healthy Diet- Avoid processed high fat foods, maintain low sodium/salt   Continue current medications as directed  Continue plan of treatment  It is always recommended that you bring your medications bottles with you to each visit - this is for your safety!

## 2025-01-06 ENCOUNTER — OFFICE VISIT (OUTPATIENT)
Dept: PULMONOLOGY | Facility: CLINIC | Age: 75
End: 2025-01-06
Payer: MEDICARE

## 2025-01-06 VITALS
HEIGHT: 74 IN | HEART RATE: 67 BPM | BODY MASS INDEX: 33.93 KG/M2 | DIASTOLIC BLOOD PRESSURE: 78 MMHG | SYSTOLIC BLOOD PRESSURE: 116 MMHG | WEIGHT: 264.4 LBS | OXYGEN SATURATION: 93 %

## 2025-01-06 DIAGNOSIS — J44.9 STAGE 2 MODERATE COPD BY GOLD CLASSIFICATION: Primary | ICD-10-CM

## 2025-01-06 DIAGNOSIS — Z77.22 SECOND HAND SMOKE EXPOSURE: ICD-10-CM

## 2025-01-06 PROCEDURE — 99214 OFFICE O/P EST MOD 30 MIN: CPT | Performed by: INTERNAL MEDICINE

## 2025-01-06 PROCEDURE — 94010 BREATHING CAPACITY TEST: CPT | Performed by: INTERNAL MEDICINE

## 2025-01-06 RX ORDER — SEMAGLUTIDE 2.68 MG/ML
2 INJECTION, SOLUTION SUBCUTANEOUS WEEKLY
COMMUNITY

## 2025-01-06 RX ORDER — NITROGLYCERIN 0.4 MG/1
0.4 TABLET SUBLINGUAL
COMMUNITY

## 2025-01-06 RX ORDER — METFORMIN HYDROCHLORIDE 500 MG/1
TABLET, EXTENDED RELEASE ORAL
COMMUNITY
Start: 2024-12-06

## 2025-01-06 RX ORDER — DIPHENHYDRAMINE HCL 12.5MG/5ML
5 LIQUID (ML) ORAL
COMMUNITY

## 2025-01-06 NOTE — PROGRESS NOTES
Background:  Patient w gold 2 copd   Chief Complaint  Stage 2 moderate COPD by GOLD classification    Subjective    History of Present Illness     Wayne Linares is here for follow up with [facility].  History of Present Illness  He has no new resp complaints.  He does not use the inhalers every day and does well.  His wife still smokes.  He tries to keep the ventilation such that he is away from it.  He has a Symbicort and a stiolto.  He denies dyspnea.     Tobacco Use: Low Risk  (1/6/2025)    Patient History     Smoking Tobacco Use: Never     Smokeless Tobacco Use: Never     Passive Exposure: Past      Current Outpatient Medications   Medication Instructions    aclidinium bromide (TUDORZA PRESSAIR) 400 MCG/ACT aerosol powder  powder for inhalation 1 puff, Inhalation, 2 Times Daily - RT    Albuterol Sulfate, sensor, 108 (90 Base) MCG/ACT aerosol powder  Inhale 2 puffs every 4 hours by inhalation route.    aspirin 81 MG tablet Take  by mouth.    diazePAM (VALIUM) 4 mg    diphenhydrAMINE (BENADRYL) 5 mg    EPINEPHrine (EPIPEN) 0.3 MG/0.3ML solution auto-injector injection Inject  into the appropriate muscle as directed by prescriber.    famotidine (PEPCID) 10 mg, 2 Times Daily    Farxiga 5 MG tablet tablet No dose, route, or frequency recorded.    ferrous sulfate 325 (65 FE) MG tablet Take  by mouth.    FLUoxetine (PROzac) 20 MG capsule TAKE 1 CAPSULE BY MOUTH DAILY IN THE MORNING    hydrochlorothiazide (HYDRODIURIL) 12.5 MG tablet No dose, route, or frequency recorded.    isosorbide mononitrate (IMDUR) 30 MG 24 hr tablet No dose, route, or frequency recorded.    lansoprazole (PREVACID) 30 MG capsule No dose, route, or frequency recorded.    metFORMIN ER (GLUCOPHAGE-XR) 500 MG 24 hr tablet Take 2 tablets every day by oral route for 90 days.    nitroglycerin (NITROSTAT) 0.4 mg, Every 5 Minutes PRN    Ozempic (2 MG/DOSE) 2 mg, Weekly    pravastatin (PRAVACHOL) 40  "mg, Daily    primidone (MYSOLINE) 250 mg, Nightly    promethazine-codeine (PHENERGAN with CODEINE) 6.25-10 MG/5ML syrup     propranolol LA (INDERAL LA) 120 MG 24 hr capsule No dose, route, or frequency recorded.    Spiriva Respimat 2.5 MCG/ACT aerosol solution inhaler USE 2 INHALATIONS ORALLY   DAILY    Symbicort 160-4.5 MCG/ACT inhaler 2 puffs, Inhalation, 2 Times Daily - RT    vitamin D (ERGOCALCIFEROL) 66307 UNITS capsule capsule No dose, route, or frequency recorded.      Objective     Vital Signs:   /78   Pulse 67   Ht 188 cm (74\")   Wt 120 kg (264 lb 6.4 oz)   SpO2 93% Comment: RA  BMI 33.95 kg/m²   Physical Exam  Constitutional:       Appearance: Normal appearance. He is not ill-appearing or diaphoretic.   Eyes:      Extraocular Movements: Extraocular movements intact.   Pulmonary:      Effort: Pulmonary effort is normal. No respiratory distress.      Breath sounds: No wheezing, rhonchi or rales.   Skin:     Findings: No rash.   Neurological:      Mental Status: He is alert.        Result Review  Data Reviewed:         PFT Values          1/3/2024    11:30 1/6/2025    11:30   Pre Drug PFT Results   FVC 84 84   FEV1 69 72   FEF 25-75% 48 53   FEV1/FVC 61 65                Assessment and Plan    Diagnoses and all orders for this visit:    1. Stage 2 moderate COPD by GOLD classification (Primary)  -     Spirometry    2. Second hand smoke exposure    Use stiolto or Spiriva but not both.  Could simplify to albuterol prn only once current supply   Continue screening scans through use.  Risk for lung ca with occupational and household exposures.  We discussed stable spirometry    Follow Up   Return in about 6 months (around 7/6/2025).  Patient was given instructions and counseling regarding his condition or for health maintenance advice. Please see specific information pulled into the AVS if appropriate.    Electronically signed by Laron Sparrow MD, 1/6/2025, 19:43 CST    "

## 2025-01-06 NOTE — PROCEDURES
Spirometry    Performed by: Rosalba Lujan, RRT  Authorized by: Laron Sparrow MD     Pre Drug % Predicted    FVC: 84%   FEV1: 72%   FEF 25-75%: 53%   FEV1/FVC: 65%    Interpretation   Spirometry   Spirometry shows moderate obstruction. There is reduced midflow suggesting small airway/airflow obstruction.   Overall comments: Results slightly improved, overall stable compared with January 2024  Electronically signed by Laron Sparrow MD, 1/6/2025, 12:25 CST

## 2025-01-13 ENCOUNTER — APPOINTMENT (OUTPATIENT)
Dept: CT IMAGING | Age: 75
End: 2025-01-13
Payer: MEDICARE

## 2025-01-13 ENCOUNTER — HOSPITAL ENCOUNTER (EMERGENCY)
Age: 75
Discharge: HOME | End: 2025-01-13
Payer: MEDICARE

## 2025-01-13 VITALS
RESPIRATION RATE: 14 BRPM | OXYGEN SATURATION: 98 % | SYSTOLIC BLOOD PRESSURE: 97 MMHG | DIASTOLIC BLOOD PRESSURE: 77 MMHG | TEMPERATURE: 98 F | HEIGHT: 74 IN | BODY MASS INDEX: 33.88 KG/M2 | WEIGHT: 264 LBS | HEART RATE: 59 BPM

## 2025-01-13 DIAGNOSIS — S06.0X1A CONCUSSION WITH LOSS OF CONSCIOUSNESS OF 30 MINUTES OR LESS, INITIAL ENCOUNTER: ICD-10-CM

## 2025-01-13 DIAGNOSIS — R42 VERTIGO: Primary | ICD-10-CM
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
## 2025-01-13 DIAGNOSIS — S16.1XXA STRAIN OF NECK MUSCLE, INITIAL ENCOUNTER: ICD-10-CM

## 2025-01-13 LAB
ALBUMIN SERPL-MCNC: 4 G/DL (ref 3.5–5.2)
ALP SERPL-CCNC: 69 U/L (ref 40–129)
ALT SERPL-CCNC: 16 U/L (ref 5–41)
ANION GAP SERPL CALCULATED.3IONS-SCNC: 11 MMOL/L (ref 7–19)
AST SERPL-CCNC: 15 U/L (ref 5–40)
BASOPHILS # BLD: 0 K/UL (ref 0–0.2)
BASOPHILS NFR BLD: 0.4 % (ref 0–1)
BILIRUB SERPL-MCNC: 0.5 MG/DL (ref 0.2–1.2)
BUN SERPL-MCNC: 19 MG/DL (ref 8–23)
CALCIUM SERPL-MCNC: 8.7 MG/DL (ref 8.8–10.2)
CHLORIDE SERPL-SCNC: 99 MMOL/L (ref 98–111)
CO2 SERPL-SCNC: 28 MMOL/L (ref 22–29)
CREAT SERPL-MCNC: 0.9 MG/DL (ref 0.7–1.2)
EKG P AXIS: 44 DEGREES
EKG P-R INTERVAL: 184 MS
EKG Q-T INTERVAL: 468 MS
EKG QRS DURATION: 170 MS
EKG QTC CALCULATION (BAZETT): 467 MS
EKG T AXIS: 4 DEGREES
EOSINOPHIL # BLD: 0.2 K/UL (ref 0–0.6)
EOSINOPHIL NFR BLD: 2.3 % (ref 0–5)
ERYTHROCYTE [DISTWIDTH] IN BLOOD BY AUTOMATED COUNT: 15 % (ref 11.5–14.5)
GLUCOSE SERPL-MCNC: 141 MG/DL (ref 70–99)
HCT VFR BLD AUTO: 44.2 % (ref 42–52)
HGB BLD-MCNC: 14.1 G/DL (ref 14–18)
IMM GRANULOCYTES # BLD: 0 K/UL
LYMPHOCYTES # BLD: 1.4 K/UL (ref 1.1–4.5)
LYMPHOCYTES NFR BLD: 15.8 % (ref 20–40)
MCH RBC QN AUTO: 29.2 PG (ref 27–31)
MCHC RBC AUTO-ENTMCNC: 31.9 G/DL (ref 33–37)
MCV RBC AUTO: 91.5 FL (ref 80–94)
MONOCYTES # BLD: 0.6 K/UL (ref 0–0.9)
MONOCYTES NFR BLD: 6.2 % (ref 0–10)
NEUTROPHILS # BLD: 6.7 K/UL (ref 1.5–7.5)
NEUTS SEG NFR BLD: 74.9 % (ref 50–65)
PLATELET # BLD AUTO: 207 K/UL (ref 130–400)
PMV BLD AUTO: 9.7 FL (ref 9.4–12.4)
POTASSIUM SERPL-SCNC: 4.5 MMOL/L (ref 3.5–5)
PROT SERPL-MCNC: 6.5 G/DL (ref 6.4–8.3)
RBC # BLD AUTO: 4.83 M/UL (ref 4.7–6.1)
SODIUM SERPL-SCNC: 138 MMOL/L (ref 136–145)
TROPONIN, HIGH SENSITIVITY: 8 NG/L (ref 0–22)
WBC # BLD AUTO: 9 K/UL (ref 4.8–10.8)

## 2025-01-13 PROCEDURE — 85025 COMPLETE CBC W/AUTO DIFF WBC: CPT

## 2025-01-13 PROCEDURE — 36415 COLL VENOUS BLD VENIPUNCTURE: CPT

## 2025-01-13 PROCEDURE — 80053 COMPREHEN METABOLIC PANEL: CPT

## 2025-01-13 PROCEDURE — 99284 EMERGENCY DEPT VISIT MOD MDM: CPT

## 2025-01-13 PROCEDURE — 84484 ASSAY OF TROPONIN QUANT: CPT

## 2025-01-13 PROCEDURE — 70450 CT HEAD/BRAIN W/O DYE: CPT

## 2025-01-13 PROCEDURE — 72125 CT NECK SPINE W/O DYE: CPT

## 2025-01-13 PROCEDURE — 93005 ELECTROCARDIOGRAM TRACING: CPT | Performed by: EMERGENCY MEDICINE

## 2025-01-13 PROCEDURE — 93010 ELECTROCARDIOGRAM REPORT: CPT | Performed by: INTERNAL MEDICINE

## 2025-01-13 ASSESSMENT — ENCOUNTER SYMPTOMS
BACK PAIN: 0
COLOR CHANGE: 0
SHORTNESS OF BREATH: 0
ABDOMINAL PAIN: 0
CHOKING: 0
NAUSEA: 0
WHEEZING: 0

## 2025-01-13 NOTE — ED PROVIDER NOTES
[facility] EMERGENCY DEPARTMENT  eMERGENCY dEPARTMENT eNCOUnter      Pt Name: Gavin Apple  MRN: [id number]  Birthdate 1950  Date of evaluation: 1/13/2025  Provider: JASMYN Gordillo    CHIEF COMPLAINT       Chief Complaint   Patient presents with    Dizziness    Fall     In shower, hit head on floor and LT hip. 81mg ASA daily. Unsure of LOC          HISTORY OF PRESENT ILLNESS   (Location/Symptom, Timing/Onset,Context/Setting, Quality, Duration, Modifying Factors, Severity)  Note limiting factors.   Gavin Apple is a 74 y.o. male who presents to the emergency department who presents after a fall.  The patient states that he got dizzy while standing in the shower and lost his balance.  Fell out of the shower onto his left side.  He remembers the fall, but thinks he might have lost consciousness for a short time, but does not know for sure.  Patient initially had left hip pain and thought that there might been a fracture.  He could not get up because he was really slippery from the soap in the shower and his wife could not hear him yell.  After a short struggle, he was able to get up and call for help.  By the time EMS arrived, he was able to walk after the stretcher and his left hip was not even hurting anymore.  States that his head felt a little foggy and he had some pain in his neck.  Denies any other pain or injuries.  Patient has a history of vertigo and states that it is not abnormal for him to get dizzy like that.  He is currently taking Ativan for this symptomatically.    HPI    NursingNotes were reviewed.    REVIEW OF SYSTEMS    (2-9 systems for level 4, 10 or more for level 5)     Review of Systems   Constitutional:  Negative for activity change, fatigue and fever.   Respiratory:  Negative for choking, shortness of breath and wheezing.    Cardiovascular:  Negative for chest pain, palpitations and leg swelling.   Gastrointestinal:  Negative for abdominal pain and nausea.   Musculoskeletal:

## 2025-01-13 NOTE — DISCHARGE INSTRUCTIONS
Alternate Tylenol and Ibuprofen as needed for pain.  Return with worsening or changing symptoms.  Follow up with PCP for further evaluation.

## 2025-04-24 RX ORDER — PRAVASTATIN SODIUM 40 MG
40 TABLET ORAL DAILY
Qty: 90 TABLET | Refills: 3 | Status: SHIPPED | OUTPATIENT
Start: 2025-04-24

## 2025-05-22 RX ORDER — PROPRANOLOL HYDROCHLORIDE 120 MG/1
120 CAPSULE, EXTENDED RELEASE ORAL DAILY
Qty: 90 CAPSULE | Refills: 1 | Status: SHIPPED | OUTPATIENT
Start: 2025-05-22

## 2025-07-07 ENCOUNTER — OFFICE VISIT (OUTPATIENT)
Dept: PULMONOLOGY | Facility: CLINIC | Age: 75
End: 2025-07-07
Payer: MEDICARE

## 2025-07-07 VITALS
SYSTOLIC BLOOD PRESSURE: 122 MMHG | DIASTOLIC BLOOD PRESSURE: 82 MMHG | OXYGEN SATURATION: 95 % | WEIGHT: 263 LBS | BODY MASS INDEX: 33.75 KG/M2 | HEIGHT: 74 IN | HEART RATE: 64 BPM

## 2025-07-07 DIAGNOSIS — J44.9 STAGE 2 MODERATE COPD BY GOLD CLASSIFICATION: Primary | ICD-10-CM

## 2025-07-07 PROCEDURE — G2211 COMPLEX E/M VISIT ADD ON: HCPCS | Performed by: INTERNAL MEDICINE

## 2025-07-07 PROCEDURE — 99213 OFFICE O/P EST LOW 20 MIN: CPT | Performed by: INTERNAL MEDICINE

## 2025-07-07 NOTE — PROGRESS NOTES
Background:  Patient w gold 2 copd   Chief Complaint  COPD    Subjective    History of Present Illness     Wayne Linares is here for follow up with [facility].  History of Present Illness  He has been on LAMA and also LAMA/LABA in the past.  Currently has Spiriva and Symbicort.  He is more short of breath in the heat and uses the inhalers more during those times.  Other times he goes without inhalers at all.       Tobacco Use: Low Risk  (7/7/2025)    Patient History     Smoking Tobacco Use: Never     Smokeless Tobacco Use: Never     Passive Exposure: Past      Current Outpatient Medications   Medication Instructions    aclidinium bromide (TUDORZA PRESSAIR) 400 MCG/ACT aerosol powder  powder for inhalation 1 puff, Inhalation, 2 Times Daily - RT    Albuterol Sulfate, sensor, 108 (90 Base) MCG/ACT aerosol powder  Inhale 2 puffs every 4 hours by inhalation route.    aspirin 81 MG tablet Take  by mouth.    diazePAM (VALIUM) 4 mg    diphenhydrAMINE (BENADRYL) 5 mg    EPINEPHrine (EPIPEN) 0.3 MG/0.3ML solution auto-injector injection Inject  into the appropriate muscle as directed by prescriber.    famotidine (PEPCID) 10 mg, 2 Times Daily    Farxiga 5 MG tablet tablet No dose, route, or frequency recorded.    ferrous sulfate 325 (65 FE) MG tablet Take  by mouth.    FLUoxetine (PROzac) 20 MG capsule TAKE 1 CAPSULE BY MOUTH DAILY IN THE MORNING    hydrochlorothiazide (HYDRODIURIL) 12.5 MG tablet No dose, route, or frequency recorded.    isosorbide mononitrate (IMDUR) 30 MG 24 hr tablet No dose, route, or frequency recorded.    lansoprazole (PREVACID) 30 MG capsule No dose, route, or frequency recorded.    metFORMIN ER (GLUCOPHAGE-XR) 500 MG 24 hr tablet Take 2 tablets every day by oral route for 90 days.    nitroglycerin (NITROSTAT) 0.4 mg, Every 5 Minutes PRN    Ozempic (2 MG/DOSE) 2 mg, Weekly    pravastatin (PRAVACHOL) 40 mg, Daily    primidone (MYSOLINE) 250 mg,  "Nightly    promethazine-codeine (PHENERGAN with CODEINE) 6.25-10 MG/5ML syrup     propranolol LA (INDERAL LA) 120 MG 24 hr capsule No dose, route, or frequency recorded.    Spiriva Respimat 2.5 MCG/ACT aerosol solution inhaler USE 2 INHALATIONS ORALLY   DAILY    Symbicort 160-4.5 MCG/ACT inhaler 2 puffs, Inhalation, 2 Times Daily - RT    vitamin D (ERGOCALCIFEROL) 01348 UNITS capsule capsule No dose, route, or frequency recorded.      Objective     Vital Signs:   /82   Pulse 64   Ht 188 cm (74\")   Wt 119 kg (263 lb)   SpO2 95% Comment: RA  BMI 33.77 kg/m²   Physical Exam  Constitutional:       General: He is not in acute distress.     Appearance: He is not ill-appearing.   Pulmonary:      Breath sounds: No wheezing or rhonchi.   Abdominal:      General: There is no distension.        Result Review  Data Reviewed:         PFT Values          1/3/2024    11:30 1/6/2025    11:30   Pre Drug PFT Results   FVC 84 84   FEV1 69 72   FEF 25-75% 48 53   FEV1/FVC 61 65                Assessment and Plan    Diagnoses and all orders for this visit:    1. Stage 2 moderate COPD by GOLD classification (Primary)    He is stable  Continue as is   Use Symbicort when more symptomatic and add on Spiriva for persistent symptoms  Continue scan through [facility] for cancer screening.      Follow Up   Return in about 6 months (around 1/7/2026) for Spirometry and DLCO.  Patient was given instructions and counseling regarding his condition or for health maintenance advice. Please see specific information pulled into the AVS if appropriate.    Electronically signed by Laron Sparrow MD, 7/7/2025, 12:28 CDT    "

## 2025-08-13 ENCOUNTER — OFFICE VISIT (OUTPATIENT)
Dept: CARDIOLOGY CLINIC | Age: 75
End: 2025-08-13
Payer: MEDICARE

## 2025-08-13 VITALS
HEART RATE: 64 BPM | BODY MASS INDEX: 34.27 KG/M2 | DIASTOLIC BLOOD PRESSURE: 70 MMHG | HEIGHT: 74 IN | WEIGHT: 267 LBS | SYSTOLIC BLOOD PRESSURE: 106 MMHG | OXYGEN SATURATION: 94 %

## 2025-08-13 DIAGNOSIS — E78.2 MIXED HYPERLIPIDEMIA: ICD-10-CM

## 2025-08-13 DIAGNOSIS — I10 PRIMARY HYPERTENSION: Primary | ICD-10-CM

## 2025-08-13 DIAGNOSIS — I25.10 CORONARY ARTERY DISEASE INVOLVING NATIVE CORONARY ARTERY OF NATIVE HEART WITHOUT ANGINA PECTORIS: ICD-10-CM

## 2025-08-13 PROCEDURE — 3078F DIAST BP <80 MM HG: CPT | Performed by: CLINICAL NURSE SPECIALIST

## 2025-08-13 PROCEDURE — G8417 CALC BMI ABV UP PARAM F/U: HCPCS | Performed by: CLINICAL NURSE SPECIALIST

## 2025-08-13 PROCEDURE — 3017F COLORECTAL CA SCREEN DOC REV: CPT | Performed by: CLINICAL NURSE SPECIALIST

## 2025-08-13 PROCEDURE — 1036F TOBACCO NON-USER: CPT | Performed by: CLINICAL NURSE SPECIALIST

## 2025-08-13 PROCEDURE — 1123F ACP DISCUSS/DSCN MKR DOCD: CPT | Performed by: CLINICAL NURSE SPECIALIST

## 2025-08-13 PROCEDURE — 1160F RVW MEDS BY RX/DR IN RCRD: CPT | Performed by: CLINICAL NURSE SPECIALIST

## 2025-08-13 PROCEDURE — 3074F SYST BP LT 130 MM HG: CPT | Performed by: CLINICAL NURSE SPECIALIST

## 2025-08-13 PROCEDURE — 99213 OFFICE O/P EST LOW 20 MIN: CPT | Performed by: CLINICAL NURSE SPECIALIST

## 2025-08-13 PROCEDURE — 1159F MED LIST DOCD IN RCRD: CPT | Performed by: CLINICAL NURSE SPECIALIST

## 2025-08-13 PROCEDURE — G8427 DOCREV CUR MEDS BY ELIG CLIN: HCPCS | Performed by: CLINICAL NURSE SPECIALIST

## 2025-08-13 RX ORDER — DAPAGLIFLOZIN 10 MG/1
10 TABLET, FILM COATED ORAL DAILY
COMMUNITY
Start: 2025-06-16

## 2025-08-13 RX ORDER — DONEPEZIL HYDROCHLORIDE 5 MG/1
5 TABLET, FILM COATED ORAL NIGHTLY
COMMUNITY
Start: 2025-08-05

## 2025-08-13 RX ORDER — SEMAGLUTIDE 2.68 MG/ML
2 INJECTION, SOLUTION SUBCUTANEOUS
COMMUNITY
Start: 2025-07-07

## 2025-08-28 RX ORDER — ISOSORBIDE MONONITRATE 30 MG/1
30 TABLET, EXTENDED RELEASE ORAL DAILY
Qty: 90 TABLET | Refills: 3 | Status: SHIPPED | OUTPATIENT
Start: 2025-08-28

## (undated) DEVICE — CONMED SCOPE SAVER BITE BLOCK, 20X27 MM: Brand: SCOPE SAVER

## (undated) DEVICE — THE CHANNEL CLEANING BRUSH IS A NYLON FLEXI BRUSH ATTACHED TO A FLEXIBLE PLASTIC SHEATH DESIGNED TO SAFELY REMOVE DEBRIS FROM FLEXIBLE ENDOSCOPES.

## (undated) DEVICE — SENSR O2 OXIMAX FNGR A/ 18IN NONSTR

## (undated) DEVICE — CUFF,BP,DISP,1 TUBE,ADULT,HP: Brand: MEDLINE

## (undated) DEVICE — THE SINGLE USE ETRAP – POLYP TRAP IS USED FOR SUCTION RETRIEVAL OF ENDOSCOPICALLY REMOVED POLYPS.: Brand: ETRAP

## (undated) DEVICE — ENDOGATOR AUXILIARY WATER JET CONNECTOR: Brand: ENDOGATOR

## (undated) DEVICE — FRCP BX RADJAW4 NDL 2.8 240 STD OG

## (undated) DEVICE — Device: Brand: DEFENDO AIR/WATER/SUCTION AND BIOPSY VALVE

## (undated) DEVICE — MSK O2 MD CONCENTR A/ LF 7FT 1P/U

## (undated) DEVICE — TBG SMPL FLTR LINE NASL 02/C02 A/ BX/100

## (undated) DEVICE — KT VLV BIOP DEFENDO SXN AIR/WATER

## (undated) DEVICE — CUFF BP 1TB CONN/BAYO A/

## (undated) DEVICE — BITEBLOCK SCOPESAVER LG LF

## (undated) DEVICE — MASK,OXYGEN,MED CONC,ADLT,7' TUB, UC: Brand: PENDING

## (undated) DEVICE — YANKAUER,BULB TIP WITH VENT: Brand: ARGYLE

## (undated) DEVICE — SNAR POLYP CAPTIVATOR MICROHEX 13 240CM

## (undated) DEVICE — TP SXN YANKR W/VENT STRL

## (undated) DEVICE — BRSH CLN CH 1.7MM 230CM 5TO7MM